# Patient Record
Sex: MALE | Race: WHITE | NOT HISPANIC OR LATINO
[De-identification: names, ages, dates, MRNs, and addresses within clinical notes are randomized per-mention and may not be internally consistent; named-entity substitution may affect disease eponyms.]

---

## 2017-01-12 ENCOUNTER — RX RENEWAL (OUTPATIENT)
Age: 72
End: 2017-01-12

## 2017-04-06 ENCOUNTER — APPOINTMENT (OUTPATIENT)
Dept: PULMONOLOGY | Facility: CLINIC | Age: 72
End: 2017-04-06

## 2017-04-06 VITALS
WEIGHT: 199 LBS | OXYGEN SATURATION: 94 % | BODY MASS INDEX: 36.4 KG/M2 | DIASTOLIC BLOOD PRESSURE: 88 MMHG | SYSTOLIC BLOOD PRESSURE: 125 MMHG | HEART RATE: 72 BPM

## 2017-07-17 ENCOUNTER — APPOINTMENT (OUTPATIENT)
Dept: PULMONOLOGY | Facility: CLINIC | Age: 72
End: 2017-07-17

## 2017-10-04 ENCOUNTER — APPOINTMENT (OUTPATIENT)
Dept: PULMONOLOGY | Facility: CLINIC | Age: 72
End: 2017-10-04

## 2017-12-22 ENCOUNTER — APPOINTMENT (OUTPATIENT)
Dept: PULMONOLOGY | Facility: CLINIC | Age: 72
End: 2017-12-22
Payer: MEDICARE

## 2017-12-22 VITALS
HEART RATE: 74 BPM | SYSTOLIC BLOOD PRESSURE: 130 MMHG | BODY MASS INDEX: 37.91 KG/M2 | HEIGHT: 62 IN | WEIGHT: 206 LBS | DIASTOLIC BLOOD PRESSURE: 80 MMHG | OXYGEN SATURATION: 93 %

## 2017-12-22 PROCEDURE — 99214 OFFICE O/P EST MOD 30 MIN: CPT

## 2017-12-22 RX ORDER — NAPROXEN 500 MG/1
500 TABLET ORAL
Qty: 14 | Refills: 0 | Status: ACTIVE | COMMUNITY
Start: 2017-10-24

## 2017-12-22 RX ORDER — GUAIFENESIN 600 MG/1
600 TABLET, EXTENDED RELEASE ORAL
Qty: 20 | Refills: 0 | Status: ACTIVE | COMMUNITY
Start: 2017-06-29

## 2017-12-22 RX ORDER — PREDNISONE 10 MG/1
10 TABLET ORAL
Qty: 6 | Refills: 0 | Status: DISCONTINUED | COMMUNITY
Start: 2017-07-11 | End: 2017-12-22

## 2018-04-26 ENCOUNTER — APPOINTMENT (OUTPATIENT)
Dept: PULMONOLOGY | Facility: CLINIC | Age: 73
End: 2018-04-26
Payer: MEDICARE

## 2018-04-26 VITALS — DIASTOLIC BLOOD PRESSURE: 70 MMHG | HEART RATE: 78 BPM | OXYGEN SATURATION: 96 % | SYSTOLIC BLOOD PRESSURE: 120 MMHG

## 2018-04-26 VITALS — WEIGHT: 208 LBS | BODY MASS INDEX: 38.04 KG/M2

## 2018-04-26 PROCEDURE — 85018 HEMOGLOBIN: CPT | Mod: QW

## 2018-04-26 PROCEDURE — 94060 EVALUATION OF WHEEZING: CPT

## 2018-04-26 PROCEDURE — 94727 GAS DIL/WSHOT DETER LNG VOL: CPT

## 2018-04-26 PROCEDURE — 94664 DEMO&/EVAL PT USE INHALER: CPT | Mod: 59

## 2018-04-26 PROCEDURE — 94729 DIFFUSING CAPACITY: CPT

## 2018-04-26 PROCEDURE — 99215 OFFICE O/P EST HI 40 MIN: CPT | Mod: 25

## 2018-10-25 ENCOUNTER — APPOINTMENT (OUTPATIENT)
Dept: PULMONOLOGY | Facility: CLINIC | Age: 73
End: 2018-10-25

## 2019-08-07 ENCOUNTER — FORM ENCOUNTER (OUTPATIENT)
Age: 74
End: 2019-08-07

## 2019-08-08 ENCOUNTER — OUTPATIENT (OUTPATIENT)
Dept: OUTPATIENT SERVICES | Facility: HOSPITAL | Age: 74
LOS: 1 days | End: 2019-08-08
Payer: MEDICARE

## 2019-08-08 ENCOUNTER — APPOINTMENT (OUTPATIENT)
Dept: RADIOLOGY | Facility: CLINIC | Age: 74
End: 2019-08-08
Payer: MEDICARE

## 2019-08-08 ENCOUNTER — APPOINTMENT (OUTPATIENT)
Dept: PULMONOLOGY | Facility: CLINIC | Age: 74
End: 2019-08-08
Payer: MEDICARE

## 2019-08-08 VITALS — HEIGHT: 63 IN | WEIGHT: 198 LBS | BODY MASS INDEX: 35.08 KG/M2

## 2019-08-08 VITALS — DIASTOLIC BLOOD PRESSURE: 70 MMHG | HEART RATE: 77 BPM | OXYGEN SATURATION: 94 % | SYSTOLIC BLOOD PRESSURE: 118 MMHG

## 2019-08-08 DIAGNOSIS — R50.9 FEVER, UNSPECIFIED: ICD-10-CM

## 2019-08-08 DIAGNOSIS — R91.8 OTHER NONSPECIFIC ABNORMAL FINDING OF LUNG FIELD: ICD-10-CM

## 2019-08-08 PROCEDURE — 94010 BREATHING CAPACITY TEST: CPT

## 2019-08-08 PROCEDURE — 71046 X-RAY EXAM CHEST 2 VIEWS: CPT | Mod: 26

## 2019-08-08 PROCEDURE — 71046 X-RAY EXAM CHEST 2 VIEWS: CPT

## 2019-08-08 PROCEDURE — 99205 OFFICE O/P NEW HI 60 MIN: CPT | Mod: 25

## 2019-08-08 RX ORDER — SULFAMETHOXAZOLE AND TRIMETHOPRIM 800; 160 MG/1; MG/1
800-160 TABLET ORAL
Qty: 14 | Refills: 0 | Status: DISCONTINUED | COMMUNITY
Start: 2017-09-28 | End: 2019-08-08

## 2019-08-08 NOTE — PHYSICAL EXAM
[Normal Appearance] : normal appearance [General Appearance - Well Developed] : well developed [Well Groomed] : well groomed [General Appearance - Well Nourished] : well nourished [General Appearance - In No Acute Distress] : no acute distress [No Deformities] : no deformities [Normal Conjunctiva] : the conjunctiva exhibited no abnormalities [Eyelids - No Xanthelasma] : the eyelids demonstrated no xanthelasmas [I] : I [Neck Circumference: ___] : neck circumference is [unfilled] [Heart Rate And Rhythm] : heart rate and rhythm were normal [Murmurs] : no murmurs present [Heart Sounds] : normal S1 and S2 [Respiration, Rhythm And Depth] : normal respiratory rhythm and effort [Auscultation Breath Sounds / Voice Sounds] : lungs were clear to auscultation bilaterally [Exaggerated Use Of Accessory Muscles For Inspiration] : no accessory muscle use [Abdomen Tenderness] : non-tender [Abdomen Soft] : soft [Abdomen Mass (___ Cm)] : no abdominal mass palpated [Abnormal Walk] : normal gait [Nail Clubbing] : no clubbing of the fingernails [Gait - Sufficient For Exercise Testing] : the gait was sufficient for exercise testing [Cyanosis, Localized] : no localized cyanosis [Petechial Hemorrhages (___cm)] : no petechial hemorrhages [] : no ischemic changes [Skin Color & Pigmentation] : normal skin color and pigmentation [Skin Lesions] : no skin lesions [No Venous Stasis] : no venous stasis [No Skin Ulcers] : no skin ulcer [No Xanthoma] : no  xanthoma was observed [Sensation] : the sensory exam was normal to light touch and pinprick [Deep Tendon Reflexes (DTR)] : deep tendon reflexes were 2+ and symmetric [No Focal Deficits] : no focal deficits

## 2019-08-08 NOTE — PROCEDURE
[Spirometry] : stable [FreeTextEntry1] : Result Date: 7/13/2019\par Facility: [facility] EXAM:  CT Chest Without Contrast EXAM DATE/TIME:  7/13/2019 8:23 PM CLINICAL HISTORY:  74 years old, male; Cough; Additional info: SOB TECHNIQUE:  Imaging protocol: Axial computed tomography images of the chest without intravenous contrast.  Lack of contrast decreases sensitivity Coronal and sagittal reformatted images were created and reviewed.  3D rendering: MIP reconstructed images were created and reviewed.  Radiation optimization: All CT scans at this facility use at least one of these dose optimization techniques: automated exposure control; mA and/or kV adjustment per patient size (includes targeted exams where dose is matched to clinical indication); or iterative reconstruction. COMPARISON:  DX CHEST PORT 7/13/2019 5:52 PM FINDINGS:  Lungs: Centrilobular emphysema bilaterally. Patchy consolidation is noted in the periphery of the right upper lobe laterally which probably represents pneumonia but given the presumed high risk for COPD followup is recommended to exclude an underlying mass. Pulmonary nodules are seen in both lungs. A 7 mm nodule image 47 in the left is noted. 8mm nodule right lower lobe image 43. Pleural-based density in the left anteriorly measuring 1.4 cm. These are indeterminate.  Pleural space: No pneumothorax  Heart: Vascular calcifications are including coronary calcifications.  Mediastinum: Small hiatal hernia.  Aorta: Unremarkable. No aortic aneurysm.  Lymph nodes: Borderline mediastinal nodes  Bones/joints: Degenerative change in the spine. L1 compression anteriorly wedged mildly probably chronic.  Soft tissues: Unremarkable.  Liver: Hepatic granuloma IMPRESSION: 1. Centrilobular emphysema bilaterally. 2. Patchy consolidation is noted in the periphery of the right upper lobe laterally which probably represents pneumonia but given the presumed high risk for COPD followup is recommended to exclude an underlying mass. 3. Pulmonary nodules are seen in both lungs. A 7 mm nodule image 47 in the left is noted. 8mm nodule right lower lobe image 43. Pleural-based density in the left anteriorly measuring 1.4 cm. These are indeterminate. Fleischner Society 2017 Guidelines High Risk Patients as defined in the 2017 Fleischner Society Guidelines: ?      History of heavy smoking ?    Exposure to asbestos, radium, or uranium ? Family history of lung cancer ?         Emphysema and pulmonary fibrosis (IPF in particular) ?      Older Age ?     Sex (females at greater risk than men) ? Race (Blacks and  at higher risk) ?   Marginal spiculation / suspicious morphology ?           Upper lobe location (also apex) ?       Multiple nodules (2-5 nodules highest risk) ? Exceptions, such as technically suboptimal scanning FLEISCHNER SOLID LESS THAN 6 SINGLE As per Fleischner Society 2017 guidelines for follow-up and management of pulmonary nodules: For patients at low risk (minimal or absent history of smoking and of other known risk factors), no routine follow-up. For patient at high risk (history of smoking or of other known risk factors), recommend optional CT at 12 months. FLEISCHNER SOLID LESS THAN 6 MULTIPLE As per Fleischner Society 2017 guidelines for follow-up and management of pulmonary nodules: For patients at low risk (minimal or absent history of smoking and of other known risk factors), no routine follow-up. For patient at high risk (history of smoking or of other known risk factors), recommend optional CT at 12 months. FLEISCHNER SOLID SIX TO EIGHT SINGLE As per Fleischner Society 2017 guidelines for follow-up and management of pulmonary nodules: For patients at low risk (minimal or absent history of smoking and of other known risk factors), recommend CT at 6-12 months, then consider CT at 18-24 months. For patient at high risk (history of smoking or of other known risk factors), recommend CT at 6-12 months, then CT at 18-24 months. FLEISCHNER SOLID SIX TO EIGHT MULTIPLE As per Fleischner Society 2017 guidelines for follow-up and management of pulmonary nodules: For patients at low risk (minimal or absent history of smoking and of other known risk factors), recommend CT at 3-6 months, then consider CT at 18-24 months. For patient at high risk (history of smoking or of other known risk factors), recommend CT at 3-6 months, then CT at 18-24 months. FLEISCHNER SOLID GREATER THAN EIGHT SINGLE As per Fleischner Society 2017 guidelines for follow-up and management of pulmonary nodules: For ALL patients, consider CT at 3 months, PET/CT or biopsy FLEISCHNER SOLID GREATER THAN EIGHT MULTIPLE As per Fleischner Society 2017 guidelines for follow-up and management of pulmonary nodules: For patients at low risk (minimal or absent history of smoking and of other known risk factors), recommend CT at 3-6 months, then consider CT at 18-24 months. For patient at high risk (history of smoking or of other known risk factors), recommend CT at 3-6 months, then CT at 18-24 months. FLEISCHNER GROUNDGLASS LESS THAN SIX As per Fleischner Society 2017 guidelines for follow-up and management of pulmonary nodules: no follow-up indicated FLEISCHNER GROUNDGLASS SIX OR GREATER As per Fleischner Society 2017 guidelines for follow-up and management of pulmonary nodules: CT at 6-12 months to confirm persistence, the CT at 3 and 5 years. FLEISCHNER PART-SOLID LESS THAN SIX As per Fleischner Society 2017 guidelines for follow-up and management of pulmonary nodules: no follow-up indicated FLEISCHNER PART-SOLID LESS SIX OR GREATER As per Fleischner Society 2017 guidelines for follow-up and management of pulmonary nodules: CT at 3-6 months to confirm persistence. If stable, then annual CT for 5 years. FLEISCHNER MULTIPLE SUBSOLID LESS THAN SIX As per Fleischner Society 2017 guidelines for follow-up and management of pulmonary nodules: CT at 3-6 months. If stable, CT at 2 and 4 years. FLEISCHNER MULTIPLE SUBSOLID SIX OR GREATER As per Fleischner Society 2017 guidelines for follow-up and management of pulmonary nodules: CT at 3-6 months. Subsequent management based on most suspicious nodule. THIS DOCUMENT HAS BEEN ELECTRONICALLY SIGNED BY DHAVAL CENTENO MD\par  \par X-ray Chest Portable\par  \par Result Date: 7/13/2019\par Facility: [facility] Portable chest on 7/13/2019 Clinical indication:Pain; Other: X; Additional info: Cough Comparison: None . Findings: There is no consolidation. Slightly prominent interstitial markings are suggested. There are no large pleural effusions. Impression: Slightly prominent interstitial markings are suggested. There are no large pleural effusions Follow up examination may be helpful for additional evaluation. THIS DOCUMENT HAS BEEN ELECTRONICALLY SIGNED BY MORGAN SHRESTHA MD\par  [___%] : current visit [unfilled]U%

## 2019-08-08 NOTE — DISCUSSION/SUMMARY
[FreeTextEntry1] : \par \par 75 yo seen today for the above. Patient with underlying COPD is currently in a mild to moderate category without active bronchospasm and will maintain the current drug regimen. His right upper lobe pneumonia clinically is responding although the patient did have a single episode of recurrent temperature. Etiology of the above is questionable and may be secondary to urinary tract infection rather than pneumonia. I am therefore sending the patient for a stat urine culture, as well as blood culture, and CBC. Repeat chest x-ray will be performed, with comparison against his prior chest x-ray and he will be called with the results. No further antibiotics are being initiated until the above is obtained. Patient has been instructed that if his symptoms worsen, he is to seek readmission to the hospital.

## 2019-08-08 NOTE — CONSULT LETTER
[Dear  ___] : Dear  [unfilled], [Consult Letter:] : I had the pleasure of evaluating your patient, [unfilled]. [Please see my note below.] : Please see my note below. [Sincerely,] : Sincerely, [DrElsi  ___] : Dr. JAMES [Maurice Rose MD] : Maurice Rose MD [FreeTextEntry3] : Maurice Rose MD FCCP\par Pulmonary/Critical Care/Sleep Medicine\par Department of Internal Medicine\par \par [facility] [employer]\par

## 2019-08-08 NOTE — REASON FOR VISIT
[Follow-Up] : a follow-up visit [Abnormal Chest X-Ray] : abnormal Chest X-Ray [COPD] : COPD [Shortness of Breath] : shortness of Breath

## 2019-08-08 NOTE — HISTORY OF PRESENT ILLNESS
[Doing Well] : doing well [None] : ~He/She~ has no comorbid illnesses [Difficulty Breathing During Exertion] : stable dyspnea on exertion [Coughing Up Sputum] : denies coughing up sputum [Feelings Of Weakness On Exertion] : stable exercise intolerance [Wheezing] : denies wheezing [Regional Soft Tissue Swelling Both Lower Extremities] : denies lower extremity edema [Adherent] : the patient is adherent with ~his/her~ medication regimen [FreeTextEntry9] : O2 dependent with exercise [de-identified] : bladder cancer and rul mass PET neg 2016 ,pulmonary nodules [de-identified] : O2 with sleep and exercise [FreeTextEntry1] : 77-year-old male with history of bladder cancer, stage II chronic obstructive lung disease, pulmonary nodules, Quintero's esophagus was admitted to [facility] on 7/13 and discharged on 7/16/19. Patient presented with increased shortness of breath, as well as low grade temperature and was found to have COPD exacerbation and a right upper lobe pneumonia. He was discharged on a drug regimen of prednisone taper as follows Cefpodoxime and azithromycin, which he completed. His symptoms abated except as green sputum within several days but last p.m. the patient noted a temperature of 101 with green sputum. He self treated with extra strength Tylenol with complete resolution of symptoms. Is here today for followup. Patient denies any chest pains, palpitations, lightheadedness, dizziness, wheezing. He is currently maintained on Advair and Incruze.

## 2019-08-09 LAB
BACTERIA UR CULT: NORMAL
BASOPHILS # BLD AUTO: 0.02 K/UL
BASOPHILS NFR BLD AUTO: 0.2 %
EOSINOPHIL # BLD AUTO: 0.21 K/UL
EOSINOPHIL NFR BLD AUTO: 2.1 %
HCT VFR BLD CALC: 41.7 %
HGB BLD-MCNC: 13.3 G/DL
IMM GRANULOCYTES NFR BLD AUTO: 0.6 %
LYMPHOCYTES # BLD AUTO: 1.61 K/UL
LYMPHOCYTES NFR BLD AUTO: 16.1 %
MAN DIFF?: NORMAL
MCHC RBC-ENTMCNC: 31.9 GM/DL
MCHC RBC-ENTMCNC: 31.9 PG
MCV RBC AUTO: 100 FL
MONOCYTES # BLD AUTO: 0.82 K/UL
MONOCYTES NFR BLD AUTO: 8.2 %
NEUTROPHILS # BLD AUTO: 7.3 K/UL
NEUTROPHILS NFR BLD AUTO: 72.8 %
PLATELET # BLD AUTO: 143 K/UL
PROCALCITONIN SERPL-MCNC: 0.08 NG/ML
RBC # BLD: 4.17 M/UL
RBC # FLD: 15.5 %
WBC # FLD AUTO: 10.02 K/UL

## 2019-08-14 LAB — BACTERIA BLD CULT: NORMAL

## 2019-09-20 ENCOUNTER — APPOINTMENT (OUTPATIENT)
Dept: PULMONOLOGY | Facility: CLINIC | Age: 74
End: 2019-09-20

## 2019-11-27 ENCOUNTER — APPOINTMENT (OUTPATIENT)
Dept: PULMONOLOGY | Facility: CLINIC | Age: 74
End: 2019-11-27
Payer: MEDICARE

## 2019-11-27 VITALS
SYSTOLIC BLOOD PRESSURE: 122 MMHG | WEIGHT: 193 LBS | DIASTOLIC BLOOD PRESSURE: 60 MMHG | BODY MASS INDEX: 34.63 KG/M2 | HEIGHT: 62.6 IN | HEART RATE: 64 BPM | OXYGEN SATURATION: 90 %

## 2019-11-27 PROCEDURE — 99215 OFFICE O/P EST HI 40 MIN: CPT | Mod: 25

## 2019-11-27 RX ORDER — NYSTATIN 100000 [USP'U]/G
100000 CREAM TOPICAL
Qty: 60 | Refills: 0 | Status: DISCONTINUED | COMMUNITY
Start: 2017-07-26 | End: 2019-11-27

## 2019-11-27 RX ORDER — NYSTATIN 100000 [USP'U]/G
100000 POWDER TOPICAL
Qty: 15 | Refills: 0 | Status: DISCONTINUED | COMMUNITY
Start: 2017-07-26 | End: 2019-11-27

## 2019-11-27 RX ORDER — ALBUTEROL SULFATE 2.5 MG/3ML
(2.5 MG/3ML) SOLUTION RESPIRATORY (INHALATION)
Refills: 5 | Status: ACTIVE | COMMUNITY
Start: 2018-04-26

## 2019-11-27 NOTE — DISCUSSION/SUMMARY
[COPD] : chronic obstructive pulmonary disease [Stable] : stable [Responding to Treatment] : responding to treatment [Stage II (Moderate)] : stage II (moderate) [None] : There are no changes in medication management [FreeTextEntry1] : Recent diagnosis of myasthenia, shows no significant respiratory compromise.\par \par Followup CAT scan was performed at a different radiologic site. Comparison of his original CAT scans with his most recent shows a significant improvement in the previously noted right upper lobe pneumonia. The current findings are most likely residual changes.

## 2019-11-27 NOTE — HISTORY OF PRESENT ILLNESS
[None] : None [Adherent] : the patient is adherent with ~his/her~ medication regimen [de-identified] : Bladder cancer, Quintero's esophagus, gastroesophageal reflux, prior pulmonary nodules, Myasthenia (recent Dx) [de-identified] : Status post community-acquired pneumonia, and last seen on 8/8/19

## 2019-11-27 NOTE — PROCEDURE
[FreeTextEntry1] : EXAM: XR CHEST PA LAT 2V \par \par \par PROCEDURE DATE: 2019 \par \par \par \par INTERPRETATION: EXAMINATION: XR CHEST PA AND LATERAL \par \par CLINICAL INDICATION: Fever \par \par TECHNIQUE: Frontal and lateral radiographs of the chest were obtained. \par \par COMPARISON: 19. \par \par FINDINGS: \par \par Cardiac silhouette normal in size. Patchy right upper lobe opacity. No \par pleural effusion or pneumothorax. \par \par IMPRESSION: \par Right upper lobe opacity appears increased from x-ray 2019 5:00 PM \par which may be technical as it appears improved compared to  radiograph \par from CT 2019. \par \par Follow-up to resolution recommended. \par \par \par LILLY ALVA M.D., ATTENDING RADIOLOGIST \par This document has been electronically signed. Aug 8 2019 3:39PM \par \par Page 1 of 2\par Office Location: [address] [place] [address]\par [facility]\par Office Phone: [phone]\par Office Fax: [phone]\par R. Phys. Name: Chapis Stephenson\par R. Phys. Address: [address] [place] [address], [facility] [address]\par R. Phys. Phone: [phone]\par PATIENT NAME: Hiren Hull\par PATIENT ID: [id number]\par : 1945\par DATE OF EXAM: 2019\par CT-CHEST PRE AND POST IV CONTRAST\par HISTORY:\par Myasthenia gravis, concern for thymoma.\par C67.0 Bladder cancer reported by the patient.\par Z87.891 Smoker prior reported by the patient.\par Per ACR guidelines and [facility] policy, a creatinine level test was performed prior\par to this exam. Results were as follows: Creatinine, 0.7 mg/dL.\par CT scan of the chest was performed with multislice axial images with reconstructions\par performed in axial, sagittal and coronal planes prior to and following IV administration\par of 60cc Omnipaque 350. This study was performed using automatic exposure control and an\par iterative reconstruction technique (radiation dose reduction software) to obtain a\par diagnostic image quality scan with patient dose as low as reasonably achievable. The mA\par and kV were adjusted according to patient size. The administered radiation dose was 6.5\par mSv.\par Priors: Prior chest CT was performed on 2018.\par THYROID: A 0.2 cm right thyroid calcification is reidentified.\par No anterior mediastinal mass is identified.\par LYMPH NODES: No axillary lymphadenopathy is identified. A 1.0 x 0.8 cm right lower\par paratracheal lymph node was previously 1.6 x 0.8 cm remeasuring in similar fashion and a\par 1.8 x 1.2 cm subcarinal lymph node was previously 2.2 x 1.4 cm remeasuring in similar\par fashion. There is a 2.6 x 0.8 cm right hilar lymph node (image 42 series 5) and a 2.0 x\par 0.6 cm left hilar lymph node (image 50 series 5).\par HEART / VASCULAR STRUCTURES: The heart is not enlarged. There is calcified coronary\par atherosclerotic disease. There is aortic valvular calcification. At the level of the main\par pulmonary artery the ascending aorta is 3.6 cm diameter and the descending aorta is 3.4 cm\par diameter. At the proximal aspect of the descending aorta (distal arch) the aorta is 4.4 cm\par diameter. A degree of aortic tortuosity is compatible with hypertension.\par LUNGS AND PLEURA: Moderate emphysema is again appreciated. There is mild peribronchial\par wall thickening. A 0.9 x 0.6 cm nodule in the right lower lobe (image 64 series 7), 1.2 x\par 0.8 nodular thickening along the anterior minor fissure (image 54), 0.4 cm nodular\par thickening along the minor fissure (image 48) 0.6 cm (lateral) and 0.4 cm (posterior) left\par lower lobe nodules (image 75), a juxtapleural 0.5 cm nodule in the lateral left lower lobe\par (image 78), 0.5 cm lingular nodule (image 57), 0.3 cm lingular nodule (image 59), 0.4 cm\par Page 2 of 2\par right middle lobe nodule (image 64), and 0.4 cm right middle lobe (lateral) nodule (image\par 57) are stable. A 0.4 cm nodule posterolaterally in the right upper lobe on image 32 and a\par 0.3 cm nodule anteriorly in the right upper lobe on image 48 are stable. However there has\par been interval development of a 1.6 x 0.7 x 1.0 cm nodular opacity posteriorly in the right\par upper lobe on axial image 24 series 7 corresponding with sagittal image 38 series 11 and\par coronal images 51-56 series 12.\par UPPER ABDOMEN: Limited evaluation of the upper abdomen shows no acute abnormality. A\par right renal anterior interpolar 1.6 cm cyst is partially included on the inferiormost\par image.\par BONES: No thoracic vertebral compression deformity or aggressive osseous lesion is\par identified. There are multilevel degenerative changes involving the spine.\par IMPRESSION:\par No anterior mediastinal mass is identified. Mildly prominent mediastinal lymph nodes have\par decreased since 2018. Hilar lymph nodes are subcentimeter in short axis.\par Emphysema and chronic bronchial inflammatory process. J43.8, J42\par Scattered pulmonary nodules measuring up to 0.9 cm and nodular thickening along the right\par minor fissure measuring up to 1.2 x 0.8 cm are stable. However, there has been interval\par development of a 1.6 x 0.7 x 1.0 cm nodular opacity posteriorly in the right upper lobe.\par Short-term chest CT follow-up in 3 months is recommended per the 2017 revised Fleischner\par Society guidelines. R91.8\par Thoracic aortic ectasia is again appreciated. Calcified coronary atherosclerotic disease\par is again noted.\par Signed by: Juan Carlos Seals MD\par Signed Date: 2019 12:45 PM EST\par SIGNED BY: Juan Carlos Seals M.D., Ext. [phone] 12:45 PM\par Chest CAT scan reviewed on PACS with the patient.\par

## 2019-11-27 NOTE — PHYSICAL EXAM
[General Appearance - Well Developed] : well developed [Normal Appearance] : normal appearance [Well Groomed] : well groomed [General Appearance - Well Nourished] : well nourished [No Deformities] : no deformities [General Appearance - In No Acute Distress] : no acute distress [Normal Conjunctiva] : the conjunctiva exhibited no abnormalities [Eyelids - No Xanthelasma] : the eyelids demonstrated no xanthelasmas [I] : I [Neck Circumference: ___] : neck circumference is [unfilled] [Heart Rate And Rhythm] : heart rate and rhythm were normal [Heart Sounds] : normal S1 and S2 [Murmurs] : no murmurs present [Respiration, Rhythm And Depth] : normal respiratory rhythm and effort [Exaggerated Use Of Accessory Muscles For Inspiration] : no accessory muscle use [Auscultation Breath Sounds / Voice Sounds] : lungs were clear to auscultation bilaterally [Abdomen Soft] : soft [Abdomen Tenderness] : non-tender [Abdomen Mass (___ Cm)] : no abdominal mass palpated [Abnormal Walk] : normal gait [Gait - Sufficient For Exercise Testing] : the gait was sufficient for exercise testing [Nail Clubbing] : no clubbing of the fingernails [Cyanosis, Localized] : no localized cyanosis [Petechial Hemorrhages (___cm)] : no petechial hemorrhages [Skin Color & Pigmentation] : normal skin color and pigmentation [] : no rash [No Venous Stasis] : no venous stasis [Skin Lesions] : no skin lesions [No Skin Ulcers] : no skin ulcer [No Xanthoma] : no  xanthoma was observed [Deep Tendon Reflexes (DTR)] : deep tendon reflexes were 2+ and symmetric [Sensation] : the sensory exam was normal to light touch and pinprick [No Focal Deficits] : no focal deficits

## 2019-11-27 NOTE — CONSULT LETTER
[Consult Letter:] : I had the pleasure of evaluating your patient, [unfilled]. [Please see my note below.] : Please see my note below. [Sincerely,] : Sincerely, [Maurice Rose MD] : Maurice Rose MD [Dear  ___] : Dear  [unfilled], [DrElsi  ___] : Dr. JAMES [FreeTextEntry3] : Maurice Rose MD FCCP\par Pulmonary/Critical Care/Sleep Medicine\par Department of Internal Medicine\par \par [facility] [employer]\par

## 2020-05-27 ENCOUNTER — APPOINTMENT (OUTPATIENT)
Dept: PULMONOLOGY | Facility: CLINIC | Age: 75
End: 2020-05-27

## 2021-07-21 ENCOUNTER — APPOINTMENT (OUTPATIENT)
Dept: PULMONOLOGY | Facility: CLINIC | Age: 76
End: 2021-07-21
Payer: MEDICARE

## 2021-07-21 ENCOUNTER — NON-APPOINTMENT (OUTPATIENT)
Age: 76
End: 2021-07-21

## 2021-07-21 VITALS
DIASTOLIC BLOOD PRESSURE: 74 MMHG | SYSTOLIC BLOOD PRESSURE: 134 MMHG | RESPIRATION RATE: 16 BRPM | HEART RATE: 77 BPM | OXYGEN SATURATION: 91 %

## 2021-07-21 DIAGNOSIS — Z86.69 PERSONAL HISTORY OF OTHER DISEASES OF THE NERVOUS SYSTEM AND SENSE ORGANS: ICD-10-CM

## 2021-07-21 DIAGNOSIS — K22.70 BARRETT'S ESOPHAGUS W/OUT DYSPLASIA: ICD-10-CM

## 2021-07-21 DIAGNOSIS — I25.10 ATHEROSCLEROTIC HEART DISEASE OF NATIVE CORONARY ARTERY W/OUT ANGINA PECTORIS: ICD-10-CM

## 2021-07-21 PROCEDURE — 99215 OFFICE O/P EST HI 40 MIN: CPT

## 2021-07-21 RX ORDER — PYRIDOSTIGMINE BROMIDE 180 MG/1
180 TABLET ORAL DAILY
Qty: 30 | Refills: 0 | Status: ACTIVE | COMMUNITY
Start: 2019-11-27

## 2021-07-21 RX ORDER — PREDNISONE 10 MG/1
10 TABLET ORAL DAILY
Qty: 120 | Refills: 0 | Status: DISCONTINUED | COMMUNITY
Start: 2019-11-27 | End: 2021-07-21

## 2021-07-21 NOTE — CONSULT LETTER
[Dear  ___] : Dear  [unfilled], [Consult Letter:] : I had the pleasure of evaluating your patient, [unfilled]. [Please see my note below.] : Please see my note below. [Consult Closing:] : Thank you very much for allowing me to participate in the care of this patient.  If you have any questions, please do not hesitate to contact me. [Sincerely,] : Sincerely, [FreeTextEntry3] : Maurice Rose MD FCCP\par Pulmonary/Critical Care/Sleep Medicine\par Department of Internal Medicine\par \par [facility] [employer] [facility]\par

## 2021-07-21 NOTE — PHYSICAL EXAM
[No Acute Distress] : no acute distress [Normal Oropharynx] : normal oropharynx [Normal Appearance] : normal appearance [No Neck Mass] : no neck mass [Normal Rate/Rhythm] : normal rate/rhythm [Normal S1, S2] : normal s1, s2 [No Murmurs] : no murmurs [No Abnormalities] : no abnormalities [Benign] : benign [Normal Gait] : normal gait [No Clubbing] : no clubbing [No Cyanosis] : no cyanosis [No Edema] : no edema [FROM] : FROM [Normal Color/ Pigmentation] : normal color/ pigmentation [No Focal Deficits] : no focal deficits [Oriented x3] : oriented x3 [Normal Affect] : normal affect [Wheeze] : wheeze

## 2021-07-21 NOTE — DISCUSSION/SUMMARY
[COPD] : chronic obstructive pulmonary disease [Stage II (Moderate)] : stage II (moderate) [Decompensated] : decompensated [PFTs] : pulmonary function tests [Chest CT] : chest CT [Medication Changes Per Orders] : Medication changes are as documented in orders [Supplemental Oxygen] : supplemental oxygen [FreeTextEntry1] : Pulmonary nodules most likely chronic. Will need f/u CCT [de-identified] : due to lack of meds and poor delivery from [facility] [de-identified] : Oxygen recerticifation

## 2021-07-21 NOTE — END OF VISIT
[FreeTextEntry3] : [facility] reviewe and records obtained [Time Spent: ___ minutes] : I have spent [unfilled] minutes of time on the encounter.

## 2021-07-21 NOTE — HISTORY OF PRESENT ILLNESS
[None] : None [Difficulty Breathing During Exertion] : stable dyspnea on exertion [Feelings Of Weakness On Exertion] : stable exercise intolerance [Coughing Up Sputum] : denies coughing up sputum [Wheezing] : denies wheezing [Regional Soft Tissue Swelling Both Lower Extremities] : denies lower extremity edema [Former] : former [>= 30 pack years] : >= 30 pack years [Intermittent] : Intermittent [NC] : Nasal Cannula [24 hrs] : 24 hours/day [FreeTextEntry1] : 3 [FreeTextEntry4] : [employer] [Fever] : no fever [Wt Gain ___ Lbs] : no recent weight gain [Adherent] : the patient is not adherent with ~his/her~ medication regimen [de-identified] : Bladder cancer, Quintero's esophagus, gastroesophageal reflux, prior pulmonary nodules, Myasthenia (recent Dx) [de-identified] : Pt last seen 11/27/19. Last hosp [facility] 12/14/19  now on O2 [FreeTextEntry3] : were not renewed over past year

## 2021-08-15 ENCOUNTER — APPOINTMENT (OUTPATIENT)
Dept: DISASTER EMERGENCY | Facility: CLINIC | Age: 76
End: 2021-08-15

## 2021-08-16 DIAGNOSIS — Z01.812 ENCOUNTER FOR PREPROCEDURAL LABORATORY EXAMINATION: ICD-10-CM

## 2021-08-16 LAB — SARS-COV-2 N GENE NPH QL NAA+PROBE: NOT DETECTED

## 2021-08-18 ENCOUNTER — APPOINTMENT (OUTPATIENT)
Dept: PULMONOLOGY | Facility: CLINIC | Age: 76
End: 2021-08-18
Payer: MEDICARE

## 2021-08-18 VITALS — RESPIRATION RATE: 16 BRPM | HEART RATE: 62 BPM | OXYGEN SATURATION: 96 %

## 2021-08-18 VITALS — DIASTOLIC BLOOD PRESSURE: 70 MMHG | SYSTOLIC BLOOD PRESSURE: 120 MMHG

## 2021-08-18 VITALS — WEIGHT: 191 LBS | HEIGHT: 62 IN | BODY MASS INDEX: 35.15 KG/M2

## 2021-08-18 PROCEDURE — 99214 OFFICE O/P EST MOD 30 MIN: CPT | Mod: 25

## 2021-08-18 PROCEDURE — 94010 BREATHING CAPACITY TEST: CPT

## 2021-08-18 NOTE — CONSULT LETTER
[Dear  ___] : Dear  [unfilled], [Consult Letter:] : I had the pleasure of evaluating your patient, [unfilled]. [Please see my note below.] : Please see my note below. [Consult Closing:] : Thank you very much for allowing me to participate in the care of this patient.  If you have any questions, please do not hesitate to contact me. [Sincerely,] : Sincerely, [FreeTextEntry3] : Maurice Rose MD FCCP\par Pulmonary/Critical Care/Sleep Medicine\par Department of Internal Medicine\par \par [facility] [employer]\par

## 2021-08-18 NOTE — END OF VISIT
[Time Spent: ___ minutes] : I have spent [unfilled] minutes of time on the encounter. [FreeTextEntry3] : [facility] reviewe and records obtained

## 2021-08-18 NOTE — HISTORY OF PRESENT ILLNESS
[Former] : former [>= 30 pack years] : >= 30 pack years [Intermittent] : Intermittent [NC] : Nasal Cannula [24 hrs] : 24 hours/day [Difficulty Breathing During Exertion] : improved dyspnea on exertion [Feelings Of Weakness On Exertion] : improved exercise intolerance [Regional Soft Tissue Swelling Both Lower Extremities] : denies lower extremity edema [None] : None [Coughing Up Sputum] : worsened coughing up sputum [Wheezing] : worsened wheezing [TextBox_4] : Has not been compliant with Myasthwenic meds [FreeTextEntry1] : 3 [FreeTextEntry4] : [employer] [Fever] : no fever [Wt Gain ___ Lbs] : no recent weight gain [Adherent] : the patient is not adherent with ~his/her~ medication regimen [de-identified] : Bladder cancer, Quintero's esophagus, gastroesophageal reflux, prior pulmonary nodules, Myasthenia (recent Dx) [FreeTextEntry3] : forgets to take

## 2021-08-18 NOTE — DISCUSSION/SUMMARY
[COPD] : chronic obstructive pulmonary disease [Decompensated] : decompensated [Stage II (Moderate)] : stage II (moderate) [PFTs] : pulmonary function tests [Chest CT] : chest CT [Medication Changes Per Orders] : Medication changes are as documented in orders [Supplemental Oxygen] : supplemental oxygen [FreeTextEntry1] : Pulmonary nodules benign\par \par MG may be active. Instructed to contact neurologist [de-identified] : due to lack of meds and poor delivery from [facility] and compliacted by MG [de-identified] : Oxygen recerticifation

## 2021-09-10 ENCOUNTER — RX RENEWAL (OUTPATIENT)
Age: 76
End: 2021-09-10

## 2021-09-26 ENCOUNTER — APPOINTMENT (OUTPATIENT)
Dept: DISASTER EMERGENCY | Facility: CLINIC | Age: 76
End: 2021-09-26

## 2021-09-26 ENCOUNTER — LABORATORY RESULT (OUTPATIENT)
Age: 76
End: 2021-09-26

## 2021-09-30 ENCOUNTER — APPOINTMENT (OUTPATIENT)
Dept: PULMONOLOGY | Facility: CLINIC | Age: 76
End: 2021-09-30
Payer: MEDICARE

## 2021-09-30 VITALS — OXYGEN SATURATION: 95 % | DIASTOLIC BLOOD PRESSURE: 68 MMHG | HEART RATE: 72 BPM | SYSTOLIC BLOOD PRESSURE: 120 MMHG

## 2021-09-30 VITALS — WEIGHT: 195 LBS | HEIGHT: 63 IN | BODY MASS INDEX: 34.55 KG/M2

## 2021-09-30 DIAGNOSIS — G70.00 MYASTHENIA GRAVIS W/OUT (ACUTE) EXACERBATION: ICD-10-CM

## 2021-09-30 PROCEDURE — 94010 BREATHING CAPACITY TEST: CPT

## 2021-09-30 PROCEDURE — 99214 OFFICE O/P EST MOD 30 MIN: CPT | Mod: 25

## 2021-09-30 NOTE — DISCUSSION/SUMMARY
[COPD] : chronic obstructive pulmonary disease [Stable] : stable [Stage II (Moderate)] : stage II (moderate) [PFTs] : pulmonary function tests [Chest CT] : chest CT [Medication Changes Per Orders] : Medication changes are as documented in orders [Supplemental Oxygen] : supplemental oxygen [FreeTextEntry1] : Pulmonary nodules benign\par \par  [de-identified] : due to lack of meds and poor delivery from [facility] and compliacted by MG [de-identified] : Oxygen recerticifation

## 2021-09-30 NOTE — HISTORY OF PRESENT ILLNESS
[Former] : former [>= 30 pack years] : >= 30 pack years [Intermittent] : Intermittent [NC] : Nasal Cannula [24 hrs] : 24 hours/day [Difficulty Breathing During Exertion] : improved dyspnea on exertion [Feelings Of Weakness On Exertion] : improved exercise intolerance [Coughing Up Sputum] : resolved coughing up sputum [Wheezing] : resolved wheezing [Regional Soft Tissue Swelling Both Lower Extremities] : denies lower extremity edema [___ Times a Day] : of [unfilled] time(s) a day [None] : None [TextBox_4] : Has not been compliant with Myasthwenic meds [FreeTextEntry1] : 3 [FreeTextEntry4] : [employer] [Fever] : no fever [Wt Gain ___ Lbs] : no recent weight gain [Adherent] : the patient is not adherent with ~his/her~ medication regimen [de-identified] : Bladder cancer, Quintero's esophagus, gastroesophageal reflux, prior pulmonary nodules, Myasthenia (recent Dx) [FreeTextEntry3] : forgets to take

## 2021-10-18 ENCOUNTER — INPATIENT (INPATIENT)
Facility: HOSPITAL | Age: 76
LOS: 2 days | Discharge: HOME | DRG: 57 | End: 2021-10-21
Attending: INTERNAL MEDICINE | Admitting: INTERNAL MEDICINE
Payer: COMMERCIAL

## 2021-10-18 VITALS
TEMPERATURE: 99 F | OXYGEN SATURATION: 95 % | SYSTOLIC BLOOD PRESSURE: 152 MMHG | HEART RATE: 71 BPM | HEIGHT: 64 IN | DIASTOLIC BLOOD PRESSURE: 84 MMHG | WEIGHT: 194.01 LBS | RESPIRATION RATE: 18 BRPM

## 2021-10-18 DIAGNOSIS — K21.9 GASTRO-ESOPHAGEAL REFLUX DISEASE WITHOUT ESOPHAGITIS: ICD-10-CM

## 2021-10-18 DIAGNOSIS — E78.5 HYPERLIPIDEMIA, UNSPECIFIED: ICD-10-CM

## 2021-10-18 DIAGNOSIS — E87.1 HYPO-OSMOLALITY AND HYPONATREMIA: ICD-10-CM

## 2021-10-18 DIAGNOSIS — D69.6 THROMBOCYTOPENIA, UNSPECIFIED: ICD-10-CM

## 2021-10-18 DIAGNOSIS — E11.9 TYPE 2 DIABETES MELLITUS WITHOUT COMPLICATIONS: ICD-10-CM

## 2021-10-18 DIAGNOSIS — G70.00 MYASTHENIA GRAVIS WITHOUT (ACUTE) EXACERBATION: ICD-10-CM

## 2021-10-18 DIAGNOSIS — N40.0 BENIGN PROSTATIC HYPERPLASIA WITHOUT LOWER URINARY TRACT SYMPTOMS: ICD-10-CM

## 2021-10-18 DIAGNOSIS — Z29.9 ENCOUNTER FOR PROPHYLACTIC MEASURES, UNSPECIFIED: ICD-10-CM

## 2021-10-18 DIAGNOSIS — R56.9 UNSPECIFIED CONVULSIONS: ICD-10-CM

## 2021-10-18 DIAGNOSIS — I25.10 ATHEROSCLEROTIC HEART DISEASE OF NATIVE CORONARY ARTERY WITHOUT ANGINA PECTORIS: ICD-10-CM

## 2021-10-18 DIAGNOSIS — J44.9 CHRONIC OBSTRUCTIVE PULMONARY DISEASE, UNSPECIFIED: ICD-10-CM

## 2021-10-18 DIAGNOSIS — I10 ESSENTIAL (PRIMARY) HYPERTENSION: ICD-10-CM

## 2021-10-18 LAB
ALBUMIN SERPL ELPH-MCNC: 3.4 G/DL — SIGNIFICANT CHANGE UP (ref 3.3–5)
ALP SERPL-CCNC: 80 U/L — SIGNIFICANT CHANGE UP (ref 40–120)
ALT FLD-CCNC: 27 U/L — SIGNIFICANT CHANGE UP (ref 12–78)
ANION GAP SERPL CALC-SCNC: 5 MMOL/L — SIGNIFICANT CHANGE UP (ref 5–17)
AST SERPL-CCNC: 19 U/L — SIGNIFICANT CHANGE UP (ref 15–37)
BASOPHILS # BLD AUTO: 0.02 K/UL — SIGNIFICANT CHANGE UP (ref 0–0.2)
BASOPHILS NFR BLD AUTO: 0.3 % — SIGNIFICANT CHANGE UP (ref 0–2)
BILIRUB SERPL-MCNC: 0.7 MG/DL — SIGNIFICANT CHANGE UP (ref 0.2–1.2)
BUN SERPL-MCNC: 11 MG/DL — SIGNIFICANT CHANGE UP (ref 7–23)
CALCIUM SERPL-MCNC: 8.6 MG/DL — SIGNIFICANT CHANGE UP (ref 8.5–10.1)
CHLORIDE SERPL-SCNC: 102 MMOL/L — SIGNIFICANT CHANGE UP (ref 96–108)
CO2 SERPL-SCNC: 26 MMOL/L — SIGNIFICANT CHANGE UP (ref 22–31)
CREAT SERPL-MCNC: 0.7 MG/DL — SIGNIFICANT CHANGE UP (ref 0.5–1.3)
EOSINOPHIL # BLD AUTO: 0.07 K/UL — SIGNIFICANT CHANGE UP (ref 0–0.5)
EOSINOPHIL NFR BLD AUTO: 0.9 % — SIGNIFICANT CHANGE UP (ref 0–6)
GLUCOSE SERPL-MCNC: 118 MG/DL — HIGH (ref 70–99)
HCT VFR BLD CALC: 40.1 % — SIGNIFICANT CHANGE UP (ref 39–50)
HGB BLD-MCNC: 13.6 G/DL — SIGNIFICANT CHANGE UP (ref 13–17)
IMM GRANULOCYTES NFR BLD AUTO: 0.4 % — SIGNIFICANT CHANGE UP (ref 0–1.5)
LYMPHOCYTES # BLD AUTO: 1.3 K/UL — SIGNIFICANT CHANGE UP (ref 1–3.3)
LYMPHOCYTES # BLD AUTO: 17.6 % — SIGNIFICANT CHANGE UP (ref 13–44)
MCHC RBC-ENTMCNC: 32.5 PG — SIGNIFICANT CHANGE UP (ref 27–34)
MCHC RBC-ENTMCNC: 33.9 GM/DL — SIGNIFICANT CHANGE UP (ref 32–36)
MCV RBC AUTO: 95.7 FL — SIGNIFICANT CHANGE UP (ref 80–100)
MONOCYTES # BLD AUTO: 0.58 K/UL — SIGNIFICANT CHANGE UP (ref 0–0.9)
MONOCYTES NFR BLD AUTO: 7.9 % — SIGNIFICANT CHANGE UP (ref 2–14)
NEUTROPHILS # BLD AUTO: 5.37 K/UL — SIGNIFICANT CHANGE UP (ref 1.8–7.4)
NEUTROPHILS NFR BLD AUTO: 72.9 % — SIGNIFICANT CHANGE UP (ref 43–77)
NRBC # BLD: 0 /100 WBCS — SIGNIFICANT CHANGE UP (ref 0–0)
PLATELET # BLD AUTO: 143 K/UL — LOW (ref 150–400)
POTASSIUM SERPL-MCNC: 4.7 MMOL/L — SIGNIFICANT CHANGE UP (ref 3.5–5.3)
POTASSIUM SERPL-SCNC: 4.7 MMOL/L — SIGNIFICANT CHANGE UP (ref 3.5–5.3)
PROT SERPL-MCNC: 7.1 G/DL — SIGNIFICANT CHANGE UP (ref 6–8.3)
RBC # BLD: 4.19 M/UL — LOW (ref 4.2–5.8)
RBC # FLD: 15.2 % — HIGH (ref 10.3–14.5)
SARS-COV-2 RNA SPEC QL NAA+PROBE: SIGNIFICANT CHANGE UP
SODIUM SERPL-SCNC: 133 MMOL/L — LOW (ref 135–145)
WBC # BLD: 7.37 K/UL — SIGNIFICANT CHANGE UP (ref 3.8–10.5)
WBC # FLD AUTO: 7.37 K/UL — SIGNIFICANT CHANGE UP (ref 3.8–10.5)

## 2021-10-18 PROCEDURE — 71046 X-RAY EXAM CHEST 2 VIEWS: CPT | Mod: 26

## 2021-10-18 PROCEDURE — 99223 1ST HOSP IP/OBS HIGH 75: CPT | Mod: GC

## 2021-10-18 PROCEDURE — 93010 ELECTROCARDIOGRAM REPORT: CPT

## 2021-10-18 PROCEDURE — 99285 EMERGENCY DEPT VISIT HI MDM: CPT

## 2021-10-18 PROCEDURE — 71045 X-RAY EXAM CHEST 1 VIEW: CPT | Mod: 26,59

## 2021-10-18 RX ORDER — BUDESONIDE AND FORMOTEROL FUMARATE DIHYDRATE 160; 4.5 UG/1; UG/1
2 AEROSOL RESPIRATORY (INHALATION)
Refills: 0 | Status: DISCONTINUED | OUTPATIENT
Start: 2021-10-18 | End: 2021-10-21

## 2021-10-18 RX ORDER — FLUTICASONE PROPIONATE AND SALMETEROL 50; 250 UG/1; UG/1
1 POWDER ORAL; RESPIRATORY (INHALATION)
Qty: 0 | Refills: 0 | DISCHARGE

## 2021-10-18 RX ORDER — ENOXAPARIN SODIUM 100 MG/ML
40 INJECTION SUBCUTANEOUS DAILY
Refills: 0 | Status: DISCONTINUED | OUTPATIENT
Start: 2021-10-18 | End: 2021-10-21

## 2021-10-18 RX ORDER — DEXTROSE 50 % IN WATER 50 %
25 SYRINGE (ML) INTRAVENOUS ONCE
Refills: 0 | Status: DISCONTINUED | OUTPATIENT
Start: 2021-10-18 | End: 2021-10-21

## 2021-10-18 RX ORDER — PYRIDOSTIGMINE BROMIDE 60 MG/5ML
60 SOLUTION ORAL EVERY 6 HOURS
Refills: 0 | Status: DISCONTINUED | OUTPATIENT
Start: 2021-10-18 | End: 2021-10-21

## 2021-10-18 RX ORDER — ACETAMINOPHEN 500 MG
2 TABLET ORAL
Qty: 0 | Refills: 0 | DISCHARGE

## 2021-10-18 RX ORDER — OXCARBAZEPINE 300 MG/1
300 TABLET, FILM COATED ORAL
Refills: 0 | Status: DISCONTINUED | OUTPATIENT
Start: 2021-10-18 | End: 2021-10-21

## 2021-10-18 RX ORDER — ATORVASTATIN CALCIUM 80 MG/1
40 TABLET, FILM COATED ORAL AT BEDTIME
Refills: 0 | Status: DISCONTINUED | OUTPATIENT
Start: 2021-10-18 | End: 2021-10-21

## 2021-10-18 RX ORDER — IPRATROPIUM/ALBUTEROL SULFATE 18-103MCG
3 AEROSOL WITH ADAPTER (GRAM) INHALATION ONCE
Refills: 0 | Status: COMPLETED | OUTPATIENT
Start: 2021-10-18 | End: 2021-10-18

## 2021-10-18 RX ORDER — GLUCAGON INJECTION, SOLUTION 0.5 MG/.1ML
1 INJECTION, SOLUTION SUBCUTANEOUS ONCE
Refills: 0 | Status: DISCONTINUED | OUTPATIENT
Start: 2021-10-18 | End: 2021-10-21

## 2021-10-18 RX ORDER — DIPHENHYDRAMINE HCL 50 MG
25 CAPSULE ORAL ONCE
Refills: 0 | Status: COMPLETED | OUTPATIENT
Start: 2021-10-18 | End: 2021-10-18

## 2021-10-18 RX ORDER — INSULIN LISPRO 100/ML
VIAL (ML) SUBCUTANEOUS
Refills: 0 | Status: DISCONTINUED | OUTPATIENT
Start: 2021-10-18 | End: 2021-10-21

## 2021-10-18 RX ORDER — ALBUTEROL 90 UG/1
1 AEROSOL, METERED ORAL DAILY
Refills: 0 | Status: DISCONTINUED | OUTPATIENT
Start: 2021-10-18 | End: 2021-10-21

## 2021-10-18 RX ORDER — DEXTROSE 50 % IN WATER 50 %
12.5 SYRINGE (ML) INTRAVENOUS ONCE
Refills: 0 | Status: DISCONTINUED | OUTPATIENT
Start: 2021-10-18 | End: 2021-10-21

## 2021-10-18 RX ORDER — ALBUTEROL 90 UG/1
2 AEROSOL, METERED ORAL
Qty: 0 | Refills: 0 | DISCHARGE

## 2021-10-18 RX ORDER — LISINOPRIL 2.5 MG/1
2.5 TABLET ORAL DAILY
Refills: 0 | Status: DISCONTINUED | OUTPATIENT
Start: 2021-10-18 | End: 2021-10-21

## 2021-10-18 RX ORDER — SODIUM CHLORIDE 9 MG/ML
1000 INJECTION, SOLUTION INTRAVENOUS
Refills: 0 | Status: DISCONTINUED | OUTPATIENT
Start: 2021-10-18 | End: 2021-10-21

## 2021-10-18 RX ORDER — LISINOPRIL 2.5 MG/1
1 TABLET ORAL
Qty: 0 | Refills: 0 | DISCHARGE

## 2021-10-18 RX ORDER — DEXTROSE 50 % IN WATER 50 %
15 SYRINGE (ML) INTRAVENOUS ONCE
Refills: 0 | Status: DISCONTINUED | OUTPATIENT
Start: 2021-10-18 | End: 2021-10-21

## 2021-10-18 RX ORDER — ACETAMINOPHEN 500 MG
650 TABLET ORAL ONCE
Refills: 0 | Status: COMPLETED | OUTPATIENT
Start: 2021-10-18 | End: 2021-10-18

## 2021-10-18 RX ORDER — IMMUNE GLOBULIN (HUMAN) 10 G/100ML
30 INJECTION INTRAVENOUS; SUBCUTANEOUS DAILY
Refills: 0 | Status: COMPLETED | OUTPATIENT
Start: 2021-10-18 | End: 2021-10-20

## 2021-10-18 RX ORDER — UMECLIDINIUM 62.5 UG/1
1 AEROSOL, POWDER ORAL
Qty: 0 | Refills: 0 | DISCHARGE

## 2021-10-18 RX ORDER — INSULIN LISPRO 100/ML
VIAL (ML) SUBCUTANEOUS AT BEDTIME
Refills: 0 | Status: DISCONTINUED | OUTPATIENT
Start: 2021-10-18 | End: 2021-10-21

## 2021-10-18 RX ORDER — ALBUTEROL 90 UG/1
3 AEROSOL, METERED ORAL
Qty: 0 | Refills: 0 | DISCHARGE

## 2021-10-18 RX ORDER — PYRIDOSTIGMINE BROMIDE 60 MG/5ML
1 SOLUTION ORAL
Qty: 0 | Refills: 0 | DISCHARGE

## 2021-10-18 RX ADMIN — Medication 650 MILLIGRAM(S): at 21:13

## 2021-10-18 RX ADMIN — Medication 25 MILLIGRAM(S): at 21:13

## 2021-10-18 RX ADMIN — Medication 125 MILLIGRAM(S): at 23:30

## 2021-10-18 RX ADMIN — Medication 650 MILLIGRAM(S): at 22:13

## 2021-10-18 RX ADMIN — OXCARBAZEPINE 300 MILLIGRAM(S): 300 TABLET, FILM COATED ORAL at 21:12

## 2021-10-18 RX ADMIN — IMMUNE GLOBULIN (HUMAN) 50 GRAM(S): 10 INJECTION INTRAVENOUS; SUBCUTANEOUS at 21:41

## 2021-10-18 RX ADMIN — ATORVASTATIN CALCIUM 40 MILLIGRAM(S): 80 TABLET, FILM COATED ORAL at 21:13

## 2021-10-18 RX ADMIN — Medication 3 MILLILITER(S): at 23:43

## 2021-10-18 NOTE — ED PROVIDER NOTE - ATTENDING CONTRIBUTION TO CARE
75yo M ho DM, and asthma presnts from neurologist for ?admission. pt unsure why he is here, states he sees a neurologist every 3 months but does not know why, for last 3 weeks has been having left eye lid drooping and weakness, no vision changes, no diff breathing no other weakness, had labwork and MRI done and went to neuro today adnw as sent in   labs notable for abnormal acetylcholinesterase panel, will chacho sesay possibly myasthenia gravis? 75yo M ho DM, and asthma presnts from neurologist for ?admission. pt unsure why he is here, states he sees a neurologist every 3 months but does not know why, for last 3 weeks has been having left eye lid drooping and weakness, with double vision, no diff breathing no other weakness, had labwork and MRI done and went to neuro today adnw as sent in   labs notable for abnormal acetylcholinesterase panel, will chacho sesay possibly myasthenia gravis?

## 2021-10-18 NOTE — ED ADULT NURSE NOTE - OBJECTIVE STATEMENT
Received the patient in the Er. patient is alert and oriented. Skin warm and dry. C/O left eye unable to open and close for 3 weeks. patient is send by PMD to see the neurologist.

## 2021-10-18 NOTE — H&P ADULT - NSHPREVIEWOFSYSTEMS_GEN_ALL_CORE
REVIEW OF SYSTEMS:  Constitutional: denies fever or chills  HEENT: endorses L eye weakness, blurry vision, drooping. denies headache, dizziness, or lightheadedness  Respiratory: denies SOB or cough   Cardiovascular: denies CP or palpitations  Gastrointestinal: denies nausea, vomiting, diarrhea, abdominal pain, melena or hematochezia  Genitourinary: denies hematuria, dysuria, frequency, urgency  Musculoskeletal: denies muscle aches, joint swelling, or muscle weakness

## 2021-10-18 NOTE — H&P ADULT - PROBLEM SELECTOR PLAN 1
- Ptosis and SOB, likely exacerbation of myasthenia gravis   - Neurology following, Dr Bonilla   - Continue IVIG 0.4 gm/KG/wt daily for 3-5 days  - Continue mestinon 60 mg qid - Ptosis and blurry vision likely exacerbation of myasthenia gravis   - Neurology following, Dr Bonilla   - Continue IVIG 0.4 gm/KG/wt daily for 3-5 days  - Continue Mestinon 60 mg qid - Ptosis and blurry vision likely exacerbation of myasthenia gravis   - Neurology following, Dr Bonilla   - Continue IVIG 0.4 gm/KG/wt daily for 3-5 days  - Continue Mestinon 60 mg qid  -check NIF and VC

## 2021-10-18 NOTE — H&P ADULT - HISTORY OF PRESENT ILLNESS
PCP: Dr. Pipo Snow  Neuro: Dr. Bonilla       75 yo M with PMH BPH, GERD. DM, COPD, CAD, seizures, and myasthenia gravis presents to the ED with left eye weakness, drooping and blurry vision. Pt had blood work and an MRI done. Pt  went to his neurologist Dr. Bonilla and advised to come to the ED for further evaluation.     Denies fever, chills, chest pain, palpitations, SOB, cough, abdominal pain, nausea, vomiting, diarrhea, constipation, urinary frequency, urgency, or dysuria, headaches, changes in vision, dizziness, numbness, tingling.  Denies recent travel, recent antibiotic use, or sick contacts.    ED Course:   Vitals: BP: 152/84, HR: 71, Temp: 98.8 F, RR: 16, SpO2: 95% on RA  Labs: plt: 143, Na: 13, gluc: 118,   CXR:  EKG:   Received in the ED    77 yo M with PMH BPH, GERD. DM, COPD, CAD, seizures, and myasthenia gravis presents to the ED with left eye weakness, drooping and blurry vision. Pt had blood work and an MRI done. Pt went to his neurologist Dr. Bonilla and advised to come to the ED for further evaluation.     Denies fever, chills, chest pain, palpitations, SOB, cough, abdominal pain, nausea, vomiting, diarrhea, constipation, urinary frequency, urgency, or dysuria, headaches dizziness, numbness, tingling.  Denies recent travel, recent antibiotic use, or sick contacts.    ED Course:   Vitals: BP: 152/84, HR: 71, Temp: 98.8 F, RR: 16, SpO2: 95% on RA  Labs: plt: 143, Na 133, gluc 118,   CXR results pending  EKG pending completion      77 yo M with PMH BPH, GERD. DM, COPD, CAD, seizures, and myasthenia gravis presents to the ED with left eye weakness, drooping and blurry vision that has been ongoing for weeks and has occurred in the past. The patient states he went to his neurologist Dr. Bonilla today and advised to come to the ED for further evaluation. The patient denies drainage or trauma from eyes. Denies headache, dizziness, numbness, tingling, chest pain, SOB, palpitations.      ED Course:   Vitals: /84, HR 71, Temp 98.8 F, RR 16, SpO2 95% on RA  Labs: plt: 143, Na 133, gluc 118  CXR results pending  EKG pending completion      77 yo M with PMH BPH, GERD. DM, COPD, CAD, seizures, and myasthenia gravis presents to the ED with left eye weakness, drooping and blurry vision that has been ongoing for weeks and has occurred in the past. The patient states he went to his neurologist Dr. Bonilla today and advised to come to the ED for further evaluation. The patient denies drainage or trauma to eyes. Denies headache, dizziness, numbness, tingling, chest pain, SOB, palpitations.      ED Course:   Vitals: /84, HR 71, Temp 98.8 F, RR 16, SpO2 95% on RA  Labs: plt: 143, Na 133, gluc 118  CXR results pending  EKG pending completion      77 yo M with PMH BPH, GERD. DM, COPD, CAD, seizures, and myasthenia gravis presents to the ED with left eye weakness, drooping and blurry vision that has been ongoing for weeks and has occurred in the past. The patient states he went to his neurologist Dr. Bonilla today and advised to come to the ED for further evaluation. The patient denies drainage or trauma to eyes. Denies headache, dizziness, numbness, tingling, chest pain, SOB, palpitations. He denies drooling. He is compliant with his home mestinon. He takes daily prednisone for COPD.      ED Course:   Vitals: /84, HR 71, Temp 98.8 F, RR 16, SpO2 95% on RA  Labs: plt: 143, Na 133, gluc 118  CXR no infiltrates visualized [official results pending]

## 2021-10-18 NOTE — ED PROVIDER NOTE - MUSCULOSKELETAL, MLM
Spine appears normal, range of motion is not limited, no muscle or joint tenderness Strong peripheral pulses

## 2021-10-18 NOTE — H&P ADULT - NSICDXPASTMEDICALHX_GEN_ALL_CORE_FT
PAST MEDICAL HISTORY:  CAD (coronary artery disease)     COPD without exacerbation     Diabetes mellitus      PAST MEDICAL HISTORY:  Benign prostatic hyperplasia     CAD (coronary artery disease)     COPD without exacerbation     Diabetes mellitus     GERD (gastroesophageal reflux disease)     Myasthenia gravis     Seizures

## 2021-10-18 NOTE — H&P ADULT - ASSESSMENT
75 yo M with PMH BPH, GERD. DM, COPD, CAD, seizures, and myasthenia gravis presents to the ED with left eye weakness, drooping and blurry vision, admitted for myasthenia gravis exacerbation.

## 2021-10-18 NOTE — ED PROVIDER NOTE - OBJECTIVE STATEMENT
Pt is a 77 yo male with pmhx of BPH GERD DM COPD CAD seizures myasthenia gravis here for left eye lid weakness drooping and blurry vision. Pt denies any other complaints no chest pain no sob. Pt had lab work and MRI done and went to neuro today Dr. Bonilla and advised to come to er for admission. labs notable for abnormal acetylcholinesterase panel.     pcp Pipo Snow

## 2021-10-18 NOTE — CHART NOTE - NSCHARTNOTEFT_GEN_A_CORE
RAPID RESPONSE     Patient seen and examined at bedside. RR called @ [23:22] for rigors. Patient is a  75 yo M admitted for Myasthenia gravis exacerbation receiving IVIG at a rate of 50 cc/hour. About 20 into starting the infusion, patient began to experience rigors. finger stick 107. Patient also with labored respirations. O2 95% on RA.     Vital Signs Last 24 Hrs  T(C): 36.9 (18 Oct 2021 22:15), Max: 37.1 (18 Oct 2021 12:28)  T(F): 98.4 (18 Oct 2021 22:15), Max: 98.8 (18 Oct 2021 12:28)  HR: 107 (18 Oct 2021 23:52) (60 - 109)  BP: 143/79 (18 Oct 2021 22:15) (116/66 - 152/84)  BP(mean): --  RR: 17 (18 Oct 2021 22:15) (16 - 18)  SpO2: 97% (18 Oct 2021 23:52) (95% - 98%)      PHYSICAL EXAM:  GENERAL: in moderate distress   HEAD:  Atraumatic   EYES: conjunctiva and sclera clear  ENT: Moist mucous membranes  NECK: Supple, No JVD  CHEST/LUNG: Clear to auscultation bilaterally; No rales, rhonchi, wheezing or rubs. Unlabored respirations  HEART: tachycardic; No murmurs, rubs, or gallops  NERVOUS SYSTEM:  Alert & Oriented X3. + rigors upper and lower extremities bilaterally  MSK: FROM x 4 extremities       LABS:                        13.6   7.37  )-----------( 143      ( 18 Oct 2021 14:17 )             40.1       10-18    133<L>  |  102  |  11  ----------------------------<  118<H>  4.7   |  26  |  0.70    Ca    8.6      18 Oct 2021 14:17    TPro  7.1  /  Alb  3.4  /  TBili  0.7  /  DBili  x   /  AST  19  /  ALT  27  /  AlkPhos  80  10-18      LIVER FUNCTIONS - ( 18 Oct 2021 14:17 )  Alb: 3.4 g/dL / Pro: 7.1 g/dL / ALK PHOS: 80 U/L / ALT: 27 U/L / AST: 19 U/L / GGT: x             CAPILLARY BLOOD GLUCOSE      POCT Blood Glucose.: 93 mg/dL (18 Oct 2021 23:26)  POCT Blood Glucose.: 107 mg/dL (18 Oct 2021 22:22)    I&O's Summary    IMAGING:  CXR: clear lungs     ASSESSMENT/ PLAN:  75 yo M with PMH BPH, GERD. DM, COPD, CAD, seizures, and myasthenia gravis presents to the ED with left eye weakness, drooping and blurry vision, admitted for myasthenia gravis exacerbation, now with rigors, reaction to IVIG.     Plan:   - given solumedrol 125 mg IV X 1   - duonebs treatment x 1  - added continuous pulse oximetry   - confirmed with pharmacy start at 24 cc/hour   - continue to monitor   - RN to call if any changes RAPID RESPONSE     Patient seen and examined at bedside. RR called @ [23:22] for rigors. Patient is a  77 yo M admitted for Myasthenia gravis exacerbation receiving IVIG at a rate of 50 cc/hour. About 20 into starting the infusion, patient began to experience rigors. finger stick 107. Patient also with labored respirations. O2 95% on RA.     Vital Signs Last 24 Hrs  T(C): 36.9 (18 Oct 2021 22:15), Max: 37.1 (18 Oct 2021 12:28)  T(F): 98.4 (18 Oct 2021 22:15), Max: 98.8 (18 Oct 2021 12:28)  HR: 107 (18 Oct 2021 23:52) (60 - 109)  BP: 143/79 (18 Oct 2021 22:15) (116/66 - 152/84)  BP(mean): --  RR: 17 (18 Oct 2021 22:15) (16 - 18)  SpO2: 97% (18 Oct 2021 23:52) (95% - 98%)      PHYSICAL EXAM:  GENERAL: in moderate distress   HEAD:  Atraumatic   EYES: conjunctiva and sclera clear  ENT: Moist mucous membranes  NECK: Supple, No JVD  CHEST/LUNG: Clear to auscultation bilaterally; No rales, rhonchi, wheezing or rubs. labored respirations  HEART: tachycardic; No murmurs, rubs, or gallops  NERVOUS SYSTEM:  Alert & Oriented X3. + rigors upper and lower extremities bilaterally  MSK: FROM x 4 extremities       LABS:                        13.6   7.37  )-----------( 143      ( 18 Oct 2021 14:17 )             40.1       10-18    133<L>  |  102  |  11  ----------------------------<  118<H>  4.7   |  26  |  0.70    Ca    8.6      18 Oct 2021 14:17    TPro  7.1  /  Alb  3.4  /  TBili  0.7  /  DBili  x   /  AST  19  /  ALT  27  /  AlkPhos  80  10-18      LIVER FUNCTIONS - ( 18 Oct 2021 14:17 )  Alb: 3.4 g/dL / Pro: 7.1 g/dL / ALK PHOS: 80 U/L / ALT: 27 U/L / AST: 19 U/L / GGT: x             CAPILLARY BLOOD GLUCOSE      POCT Blood Glucose.: 93 mg/dL (18 Oct 2021 23:26)  POCT Blood Glucose.: 107 mg/dL (18 Oct 2021 22:22)    I&O's Summary    IMAGING:  CXR: clear lungs     ASSESSMENT/ PLAN:  77 yo M with PMH BPH, GERD. DM, COPD, CAD, seizures, and myasthenia gravis presents to the ED with left eye weakness, drooping and blurry vision, admitted for myasthenia gravis exacerbation, now with rigors, reaction to IVIG.     Plan:   - given solumedrol 125 mg IV X 1   - duonebs treatment x 1  - added continuous pulse oximetry   - confirmed with pharmacy start at 24 cc/hour   - continue to monitor   - RN to call if any changes RAPID RESPONSE     Patient seen and examined at bedside. RR called @ [23:22] for rigors. Patient is a  75 yo M admitted for Myasthenia gravis exacerbation receiving IVIG at a rate of 50 cc/hour. About 20 into starting the infusion, patient began to experience rigors. finger stick 107. Patient also with labored respirations. O2 95% on RA.     VS: Temp 97.0; /110; ; RR 20; Spo2 93%; BS 93    Vital Signs Last 24 Hrs  T(C): 36.9 (18 Oct 2021 22:15), Max: 37.1 (18 Oct 2021 12:28)  T(F): 98.4 (18 Oct 2021 22:15), Max: 98.8 (18 Oct 2021 12:28)  HR: 107 (18 Oct 2021 23:52) (60 - 109)  BP: 143/79 (18 Oct 2021 22:15) (116/66 - 152/84)  BP(mean): --  RR: 17 (18 Oct 2021 22:15) (16 - 18)  SpO2: 97% (18 Oct 2021 23:52) (95% - 98%)      PHYSICAL EXAM:  GENERAL: in moderate distress   HEAD:  Atraumatic   EYES: conjunctiva and sclera clear  ENT: Moist mucous membranes  NECK: Supple, No JVD  CHEST/LUNG: Clear to auscultation bilaterally; No rales, rhonchi, wheezing or rubs. labored respirations  HEART: tachycardic; No murmurs, rubs, or gallops  NERVOUS SYSTEM:  Alert & Oriented X3. + rigors upper and lower extremities bilaterally  MSK: FROM x 4 extremities       LABS:                        13.6   7.37  )-----------( 143      ( 18 Oct 2021 14:17 )             40.1       10-18    133<L>  |  102  |  11  ----------------------------<  118<H>  4.7   |  26  |  0.70    Ca    8.6      18 Oct 2021 14:17    TPro  7.1  /  Alb  3.4  /  TBili  0.7  /  DBili  x   /  AST  19  /  ALT  27  /  AlkPhos  80  10-18      LIVER FUNCTIONS - ( 18 Oct 2021 14:17 )  Alb: 3.4 g/dL / Pro: 7.1 g/dL / ALK PHOS: 80 U/L / ALT: 27 U/L / AST: 19 U/L / GGT: x             CAPILLARY BLOOD GLUCOSE      POCT Blood Glucose.: 93 mg/dL (18 Oct 2021 23:26)  POCT Blood Glucose.: 107 mg/dL (18 Oct 2021 22:22)    I&O's Summary    IMAGING:  CXR: clear lungs     ASSESSMENT/ PLAN:  75 yo M with PMH BPH, GERD. DM, COPD, CAD, seizures, and myasthenia gravis presents to the ED with left eye weakness, drooping and blurry vision, admitted for myasthenia gravis exacerbation, now with rigors, reaction to IVIG.     Plan:   - benadryl 50mg x 1  - given solumedrol 125 mg IV X 1   - duonebs treatment x 1  - added continuous pulse oximetry   - confirmed with pharmacy start at 24 cc/hour   - continue to monitor   - RN to call if any changes RAPID RESPONSE     Patient seen and examined at bedside. RR called @ [23:22] for rigors. Patient is a  77 yo M admitted for Myasthenia gravis exacerbation receiving IVIG at a rate of 50 cc/hour. About 20 into starting the infusion, patient began to experience rigors. finger stick 107. Patient also with labored respirations. O2 95% on RA.     VS: Temp 97.0; /110; ; RR 20; Spo2 93%; BS 93    Vital Signs Last 24 Hrs  T(C): 36.9 (18 Oct 2021 22:15), Max: 37.1 (18 Oct 2021 12:28)  T(F): 98.4 (18 Oct 2021 22:15), Max: 98.8 (18 Oct 2021 12:28)  HR: 107 (18 Oct 2021 23:52) (60 - 109)  BP: 143/79 (18 Oct 2021 22:15) (116/66 - 152/84)  BP(mean): --  RR: 17 (18 Oct 2021 22:15) (16 - 18)  SpO2: 97% (18 Oct 2021 23:52) (95% - 98%)      PHYSICAL EXAM:  GENERAL: in moderate distress   HEAD:  Atraumatic   EYES: conjunctiva and sclera clear  ENT: Moist mucous membranes  NECK: Supple, No JVD  CHEST/LUNG: Clear to auscultation bilaterally; No rales, rhonchi, wheezing or rubs. labored respirations  HEART: tachycardic; No murmurs, rubs, or gallops  NERVOUS SYSTEM:  Alert & Oriented X3. + rigors upper and lower extremities bilaterally  MSK: FROM x 4 extremities       LABS:                        13.6   7.37  )-----------( 143      ( 18 Oct 2021 14:17 )             40.1       10-18    133<L>  |  102  |  11  ----------------------------<  118<H>  4.7   |  26  |  0.70    Ca    8.6      18 Oct 2021 14:17    TPro  7.1  /  Alb  3.4  /  TBili  0.7  /  DBili  x   /  AST  19  /  ALT  27  /  AlkPhos  80  10-18      LIVER FUNCTIONS - ( 18 Oct 2021 14:17 )  Alb: 3.4 g/dL / Pro: 7.1 g/dL / ALK PHOS: 80 U/L / ALT: 27 U/L / AST: 19 U/L / GGT: x             CAPILLARY BLOOD GLUCOSE      POCT Blood Glucose.: 93 mg/dL (18 Oct 2021 23:26)  POCT Blood Glucose.: 107 mg/dL (18 Oct 2021 22:22)    I&O's Summary    IMAGING:  CXR: clear lungs     ASSESSMENT/ PLAN:  77 yo M with PMH BPH, GERD. DM, COPD, CAD, seizures, and myasthenia gravis presents to the ED with left eye weakness, drooping and blurry vision, admitted for myasthenia gravis exacerbation, now with rigors, reaction to IVIG.     Plan:   - benadryl 50mg x 1  - given solumedrol 125 mg IV X 1   - duonebs treatment x 1  - added continuous pulse oximetry   - confirmed with pharmacy start at 24 cc/hour   - continue to monitor   - RN to call if any changes    ___________________________________________________________________________________________  _____________________________________________________________________________________________________________________    Pt seen and examined at bedside @1:30am for follow up of rapid response called @23:22 for rigors.    ROS:  CONSTITUTIONAL: No weakness, fevers or chills  EYES/ENT: No visual changes;  No vertigo or throat pain   NECK: No pain or stiffness  RESPIRATORY: No cough, wheezing, hemoptysis; No shortness of breath  CARDIOVASCULAR: No chest pain or palpitations  GASTROINTESTINAL: No abdominal or epigastric pain. No nausea, vomiting, or hematemesis; No diarrhea or constipation. No melena or hematochezia.  GENITOURINARY: No dysuria, frequency or hematuria  NEUROLOGICAL: No numbness or weakness  SKIN: No itching, burning, rashes, or lesions   All other review of systems is negative unless indicated above.    PHYSICAL EXAM:  General: Well developed, well nourished, NAD  HEENT: NCAT, PERRLA, EOMI B/L, moist mucous membranes   Neck: Supple, nontender, no mass  Neurology: A&Ox3, nonfocal, CN II-XII grossly intact, sensation intact, no gait abnormalities   Respiratory: CTA B/L, No wheezes, rales, rhonchi  CV: RRR, +S1/S2, no murmurs, rubs or gallops  Abdominal: Soft, NT, ND +BSx4  Extremities: No clubbing, cyanosis, edema; + peripheral pulses  MSK: Normal ROM, no joint erythema or warmth, no joint swelling   Skin: warm, dry, normal color, no rash or abnormal lesions      Vital Signs Last 24 Hrs  T(C): 37.4 (19 Oct 2021 01:41), Max: 37.4 (19 Oct 2021 01:41)  T(F): 99.3 (19 Oct 2021 01:41), Max: 99.3 (19 Oct 2021 01:41)  HR: 73 (19 Oct 2021 01:41) (60 - 109)  BP: 126/69 (19 Oct 2021 01:41) (116/66 - 152/84)  BP(mean): --  RR: 17 (19 Oct 2021 01:41) (16 - 18)  SpO2: 93% (19 Oct 2021 01:41) (93% - 98%)                            13.6   7.37  )-----------( 143      ( 18 Oct 2021 14:17 )             40.1     10-18    133<L>  |  102  |  11  ----------------------------<  118<H>  4.7   |  26  |  0.70    Ca    8.6      18 Oct 2021 14:17    TPro  7.1  /  Alb  3.4  /  TBili  0.7  /  DBili  x   /  AST  19  /  ALT  27  /  AlkPhos  80  10-18            A/P RAPID RESPONSE     Patient seen and examined at bedside. RR called @ [23:22] for rigors. Patient is a  75 yo M admitted for Myasthenia gravis exacerbation receiving IVIG at a rate of 50 cc/hour. About 20 into starting the infusion, patient began to experience rigors. finger stick 107. Patient also with labored respirations. O2 95% on RA.     VS: Temp 97.0; /110; ; RR 20; Spo2 93%; BS 93    Vital Signs Last 24 Hrs  T(C): 36.9 (18 Oct 2021 22:15), Max: 37.1 (18 Oct 2021 12:28)  T(F): 98.4 (18 Oct 2021 22:15), Max: 98.8 (18 Oct 2021 12:28)  HR: 107 (18 Oct 2021 23:52) (60 - 109)  BP: 143/79 (18 Oct 2021 22:15) (116/66 - 152/84)  BP(mean): --  RR: 17 (18 Oct 2021 22:15) (16 - 18)  SpO2: 97% (18 Oct 2021 23:52) (95% - 98%)      PHYSICAL EXAM:  GENERAL: in moderate distress   HEAD:  Atraumatic   EYES: conjunctiva and sclera clear  ENT: Moist mucous membranes  NECK: Supple, No JVD  CHEST/LUNG: Clear to auscultation bilaterally; No rales, rhonchi, wheezing or rubs. labored respirations  HEART: tachycardic; No murmurs, rubs, or gallops  NERVOUS SYSTEM:  Alert & Oriented X3. + rigors upper and lower extremities bilaterally  MSK: FROM x 4 extremities       LABS:                        13.6   7.37  )-----------( 143      ( 18 Oct 2021 14:17 )             40.1       10-18    133<L>  |  102  |  11  ----------------------------<  118<H>  4.7   |  26  |  0.70    Ca    8.6      18 Oct 2021 14:17    TPro  7.1  /  Alb  3.4  /  TBili  0.7  /  DBili  x   /  AST  19  /  ALT  27  /  AlkPhos  80  10-18      LIVER FUNCTIONS - ( 18 Oct 2021 14:17 )  Alb: 3.4 g/dL / Pro: 7.1 g/dL / ALK PHOS: 80 U/L / ALT: 27 U/L / AST: 19 U/L / GGT: x             CAPILLARY BLOOD GLUCOSE      POCT Blood Glucose.: 93 mg/dL (18 Oct 2021 23:26)  POCT Blood Glucose.: 107 mg/dL (18 Oct 2021 22:22)    I&O's Summary    IMAGING:  CXR: clear lungs     ASSESSMENT/ PLAN:  75 yo M with PMH BPH, GERD. DM, COPD, CAD, seizures, and myasthenia gravis presents to the ED with left eye weakness, drooping and blurry vision, admitted for myasthenia gravis exacerbation, now with rigors, reaction to IVIG.     Plan:   - benadryl 50mg x 1  - given solumedrol 125 mg IV X 1   - duonebs treatment x 1  - added continuous pulse oximetry   - confirmed with pharmacy start at 24 cc/hour   - continue to monitor   - RN to call if any changes    ___________________________________________________________________________________________  _____________________________________________________________________________________________________________________    Pt seen and examined at bedside @1:30am for follow up of rapid response called @23:22 for rigors following initiation of IVIG infusion. Patient reports feeling much improved. Denies chills, shortness of breath, chest pain, nausea, vomiting.     VS: Temp 99.3; /69; HR 73; RR 17; Spo2 93 on 2L NC    PHYSICAL EXAM:  General: Well developed, well nourished, NAD, resting comfortably in bed  HEENT: NCAT, PERRLA, left eyelid droop, moist mucous membranes   Neck: Supple, nontender, no mass  Neurology: A&Ox3  Respiratory: CTA B/L, No wheezes, rales, rhonchi  CV: RRR, +S1/S2, no murmurs, rubs or gallops  Abdominal: Soft, NT, ND +BSx4  Extremities: No clubbing, cyanosis, edema; + peripheral pulses  MSK: Normal ROM of all 4 extremities  Skin: warm, dry, normal color, no rash or abnormal lesions      Vital Signs Last 24 Hrs  T(C): 37.4 (19 Oct 2021 01:41), Max: 37.4 (19 Oct 2021 01:41)  T(F): 99.3 (19 Oct 2021 01:41), Max: 99.3 (19 Oct 2021 01:41)  HR: 73 (19 Oct 2021 01:41) (60 - 109)  BP: 126/69 (19 Oct 2021 01:41) (116/66 - 152/84)  BP(mean): --  RR: 17 (19 Oct 2021 01:41) (16 - 18)  SpO2: 93% (19 Oct 2021 01:41) (93% - 98%)                            13.6   7.37  )-----------( 143      ( 18 Oct 2021 14:17 )             40.1     10-18    133<L>  |  102  |  11  ----------------------------<  118<H>  4.7   |  26  |  0.70    Ca    8.6      18 Oct 2021 14:17    TPro  7.1  /  Alb  3.4  /  TBili  0.7  /  DBili  x   /  AST  19  /  ALT  27  /  AlkPhos  80  10-18            A/P  75 yo M with PMH BPH, GERD. DM, COPD, CAD, seizures, and myasthenia gravis presents to the ED with left eye weakness, drooping and blurry vision, admitted for myasthenia gravis exacerbation, now with rigors, reaction to IVIG.   - continuous pulse oximetry   - confirmed with pharmacy: will restart now at 24 cc/hour per protocol  - continue to monitor   - RN to call if any changes

## 2021-10-18 NOTE — H&P ADULT - NSHPSOCIALHISTORY_GEN_ALL_CORE
Lives:   ADLS:  Tobacco:  Alcohol: Lives: alone  ADLS: independently   Tobacco: denies  Alcohol: denies   Diet: regular w/o allergies or restrictions

## 2021-10-18 NOTE — H&P ADULT - PROBLEM SELECTOR PLAN 4
- chronic, stable  - continue prednisone 2.5mg qd, advair diskus 250-50, albuterol 90mcg, albuterol 2.5mg/3mL, elipta 62.5mcg/h

## 2021-10-18 NOTE — ED ADULT TRIAGE NOTE - BP NONINVASIVE DIASTOLIC (MM HG)
84 Ree: pt received from Dr. Gonzalez at s/o; pt with h/o endometriosis who p/w epig abd pain, n/v, seen in ed yesterday for same. + epig and lower abd tenderness on exam. Awaiting labs, pelvic us. Will continue to monitor. pt feeling much better, sono neg

## 2021-10-18 NOTE — ED PROVIDER NOTE - CLINICAL SUMMARY MEDICAL DECISION MAKING FREE TEXT BOX
Pt is a 77 yo male with left eye weakness spoke with Dr. Bonilla advised to admit for IVIG on resp complaints

## 2021-10-18 NOTE — H&P ADULT - ATTENDING COMMENTS
75 yo M with PMH BPH, GERD. DM, COPD, CAD, seizures, and myasthenia gravis presents to the ED with left eye weakness, drooping and blurry vision, admitted for myasthenia gravis exacerbation.    HPI as above.     T(C): 36.2 (10-18-21 @ 17:25), Max: 37.1 (10-18-21 @ 12:28)  HR: 68 (10-18-21 @ 17:25) (68 - 71)  BP: 130/60 (10-18-21 @ 17:25) (130/60 - 152/84)  RR: 16 (10-18-21 @ 17:25) (16 - 18)  SpO2: 98% (10-18-21 @ 17:25) (95% - 98%)  Wt(kg): --    Physical Exam:   GENERAL: well-groomed, well-developed, NAD  HEENT: head NC/AT; EOM intact, PERRLA, conjunctiva & sclera clear; hearing grossly intact, moist mucous membranes. Left eye ptosis  NECK: supple, no JVD  RESPIRATORY: CTA B/L, no wheezing, rales, rhonchi or rubs  CARDIOVASCULAR: S1&S2, RRR, no murmurs or gallops  ABDOMEN: soft, non-tender, non-distended, + Bowel sounds x4 quadrants, no guarding, rebound or rigidity  MUSCULOSKELETAL:  no clubbing, cyanosis or edema of all 4 extremities  LYMPH: no cervical lymphadenopathy  VASCULAR: Radial pulses 2+ bilaterally, no varicose veins   SKIN: warm and dry, color normal  NEUROLOGIC: AA&O X3, CN2-12 intact w/ no focal deficits aside from ptosis listed above, no sensory loss, motor Strength 5/5 in UE & LE B/L  Psych: Normal mood and affect, normal behavior    Plan:   Start IVIG, continue mestinon. no indication for IV steroids.   -check NIF and VC. Passed dysphagia screen.   -discussed with neurology.   -remainder as above,.

## 2021-10-18 NOTE — H&P ADULT - PROBLEM SELECTOR PLAN 2
- Lovenox 40 mg qd - Na 133 on admission  - no signs or sx of hyponatremia   - monitor with daily CMPs

## 2021-10-18 NOTE — H&P ADULT - NSHPPHYSICALEXAM_GEN_ALL_CORE
T(C): 37.1 (10-18-21 @ 12:28), Max: 37.1 (10-18-21 @ 12:28)  HR: 71 (10-18-21 @ 12:28) (71 - 71)  BP: 152/84 (10-18-21 @ 12:28) (152/84 - 152/84)  RR: 18 (10-18-21 @ 12:28) (18 - 18)  SpO2: 95% (10-18-21 @ 12:28) (95% - 95%)  Wt(kg): --    Physical Exam:   GENERAL: well-groomed, well-developed, NAD  HEENT: head NC/AT; EOM intact, PERRLA, conjunctiva & sclera clear; hearing grossly intact, moist mucous membranes  NECK: supple, no JVD  RESPIRATORY: CTA B/L, no wheezing, rales, rhonchi or rubs  CARDIOVASCULAR: S1&S2, RRR, no murmurs or gallops  ABDOMEN: soft, non-tender, non-distended, + Bowel sounds x4 quadrants, no guarding, rebound or rigidity  MUSCULOSKELETAL:  no clubbing, cyanosis or edema of all 4 extremities  LYMPH: no cervical lymphadenopathy  VASCULAR: Radial pulses 2+ bilaterally, no varicose veins   SKIN: warm and dry, color normal  NEUROLOGIC: AA&O X3, CN2-12 intact w/ no focal deficits, no sensory loss, motor Strength 5/5 in UE & LE B/L  Psych: Normal mood and affect, normal behavior T(C): 37.1 (10-18-21 @ 12:28), Max: 37.1 (10-18-21 @ 12:28)  HR: 71 (10-18-21 @ 12:28) (71 - 71)  BP: 152/84 (10-18-21 @ 12:28) (152/84 - 152/84)  RR: 18 (10-18-21 @ 12:28) (18 - 18)  SpO2: 95% (10-18-21 @ 12:28) (95% - 95%)  Wt(kg): --    Physical Exam:   GENERAL: well-appearing, comfortable, in NAD  HEENT: head NC/AT; L eyelid droop, conjunctiva & sclera clear; hearing grossly intact, moist mucous membranes  NECK: supple, no JVD  RESPIRATORY: CTA B/L, no wheezing, rales, rhonchi or rubs  CARDIOVASCULAR: S1&S2, RRR, no murmurs or gallops  ABDOMEN: soft, non-tender, non-distended, + Bowel sounds x4 quadrants, no guarding, rebound or rigidity  MUSCULOSKELETAL:  no clubbing, cyanosis or edema of all 4 extremities  LYMPH: no cervical lymphadenopathy  VASCULAR: Radial pulses 2+ bilaterally, no varicose veins   SKIN: warm and dry, color normal  NEUROLOGIC: AA&O X3, CN2-12 intact w/ no focal deficits, no sensory loss, motor Strength 5/5 in UE & LE B/L  Psych: Normal mood and affect, normal behavior T(C): 37.1 (10-18-21 @ 12:28), Max: 37.1 (10-18-21 @ 12:28)  HR: 71 (10-18-21 @ 12:28) (71 - 71)  BP: 152/84 (10-18-21 @ 12:28) (152/84 - 152/84)  RR: 18 (10-18-21 @ 12:28) (18 - 18)  SpO2: 95% (10-18-21 @ 12:28) (95% - 95%)  Wt(kg): --    Physical Exam:   GENERAL: well-appearing, comfortable, in NAD  HEENT: head NC/AT; L eyelid droop, conjunctiva & sclera clear; hearing grossly intact, moist mucous membranes  NECK: supple, no JVD  RESPIRATORY: CTA B/L, no wheezing, rales, rhonchi or rubs  CARDIOVASCULAR: S1&S2, RRR, no murmurs or gallops  ABDOMEN: soft, non-tender, non-distended, + Bowel sounds x4 quadrants, no guarding, rebound or rigidity  MUSCULOSKELETAL:  no clubbing, cyanosis or edema of all 4 extremities  LYMPH: no cervical lymphadenopathy  VASCULAR: Radial pulses 2+ bilaterally, no varicose veins   SKIN: warm and dry, color normal  NEUROLOGIC: AA&O X3, CN2-12 intact w/ no focal deficits [aside from L eyelid ptosis], no sensory loss, motor Strength 5/5 in UE & LE B/L  Psych: Normal mood and affect, normal behavior

## 2021-10-19 LAB
A1C WITH ESTIMATED AVERAGE GLUCOSE RESULT: 6 % — HIGH (ref 4–5.6)
ALBUMIN SERPL ELPH-MCNC: 3.4 G/DL — SIGNIFICANT CHANGE UP (ref 3.3–5)
ALP SERPL-CCNC: 78 U/L — SIGNIFICANT CHANGE UP (ref 40–120)
ALT FLD-CCNC: 28 U/L — SIGNIFICANT CHANGE UP (ref 12–78)
ANION GAP SERPL CALC-SCNC: 8 MMOL/L — SIGNIFICANT CHANGE UP (ref 5–17)
AST SERPL-CCNC: 20 U/L — SIGNIFICANT CHANGE UP (ref 15–37)
BILIRUB SERPL-MCNC: 0.7 MG/DL — SIGNIFICANT CHANGE UP (ref 0.2–1.2)
BUN SERPL-MCNC: 14 MG/DL — SIGNIFICANT CHANGE UP (ref 7–23)
CALCIUM SERPL-MCNC: 8.9 MG/DL — SIGNIFICANT CHANGE UP (ref 8.5–10.1)
CHLORIDE SERPL-SCNC: 98 MMOL/L — SIGNIFICANT CHANGE UP (ref 96–108)
CO2 SERPL-SCNC: 25 MMOL/L — SIGNIFICANT CHANGE UP (ref 22–31)
COVID-19 SPIKE DOMAIN AB INTERP: POSITIVE
COVID-19 SPIKE DOMAIN ANTIBODY RESULT: >250 U/ML — HIGH
CREAT SERPL-MCNC: 0.86 MG/DL — SIGNIFICANT CHANGE UP (ref 0.5–1.3)
ESTIMATED AVERAGE GLUCOSE: 126 MG/DL — HIGH (ref 68–114)
GLUCOSE SERPL-MCNC: 176 MG/DL — HIGH (ref 70–99)
HCT VFR BLD CALC: 42.7 % — SIGNIFICANT CHANGE UP (ref 39–50)
HGB BLD-MCNC: 14.5 G/DL — SIGNIFICANT CHANGE UP (ref 13–17)
MCHC RBC-ENTMCNC: 32.6 PG — SIGNIFICANT CHANGE UP (ref 27–34)
MCHC RBC-ENTMCNC: 34 GM/DL — SIGNIFICANT CHANGE UP (ref 32–36)
MCV RBC AUTO: 96 FL — SIGNIFICANT CHANGE UP (ref 80–100)
NRBC # BLD: 0 /100 WBCS — SIGNIFICANT CHANGE UP (ref 0–0)
PLATELET # BLD AUTO: 165 K/UL — SIGNIFICANT CHANGE UP (ref 150–400)
POTASSIUM SERPL-MCNC: 4.5 MMOL/L — SIGNIFICANT CHANGE UP (ref 3.5–5.3)
POTASSIUM SERPL-SCNC: 4.5 MMOL/L — SIGNIFICANT CHANGE UP (ref 3.5–5.3)
PROT SERPL-MCNC: 8.2 G/DL — SIGNIFICANT CHANGE UP (ref 6–8.3)
RBC # BLD: 4.45 M/UL — SIGNIFICANT CHANGE UP (ref 4.2–5.8)
RBC # FLD: 15.1 % — HIGH (ref 10.3–14.5)
SARS-COV-2 IGG+IGM SERPL QL IA: >250 U/ML — HIGH
SARS-COV-2 IGG+IGM SERPL QL IA: POSITIVE
SODIUM SERPL-SCNC: 131 MMOL/L — LOW (ref 135–145)
WBC # BLD: 7.67 K/UL — SIGNIFICANT CHANGE UP (ref 3.8–10.5)
WBC # FLD AUTO: 7.67 K/UL — SIGNIFICANT CHANGE UP (ref 3.8–10.5)

## 2021-10-19 PROCEDURE — 99233 SBSQ HOSP IP/OBS HIGH 50: CPT | Mod: GC

## 2021-10-19 RX ADMIN — ATORVASTATIN CALCIUM 40 MILLIGRAM(S): 80 TABLET, FILM COATED ORAL at 21:09

## 2021-10-19 RX ADMIN — PYRIDOSTIGMINE BROMIDE 60 MILLIGRAM(S): 60 SOLUTION ORAL at 05:12

## 2021-10-19 RX ADMIN — PYRIDOSTIGMINE BROMIDE 60 MILLIGRAM(S): 60 SOLUTION ORAL at 11:50

## 2021-10-19 RX ADMIN — IMMUNE GLOBULIN (HUMAN) 50 GRAM(S): 10 INJECTION INTRAVENOUS; SUBCUTANEOUS at 21:07

## 2021-10-19 RX ADMIN — PYRIDOSTIGMINE BROMIDE 60 MILLIGRAM(S): 60 SOLUTION ORAL at 17:22

## 2021-10-19 RX ADMIN — PYRIDOSTIGMINE BROMIDE 60 MILLIGRAM(S): 60 SOLUTION ORAL at 23:44

## 2021-10-19 RX ADMIN — OXCARBAZEPINE 300 MILLIGRAM(S): 300 TABLET, FILM COATED ORAL at 21:09

## 2021-10-19 RX ADMIN — PYRIDOSTIGMINE BROMIDE 60 MILLIGRAM(S): 60 SOLUTION ORAL at 00:41

## 2021-10-19 RX ADMIN — LISINOPRIL 2.5 MILLIGRAM(S): 2.5 TABLET ORAL at 05:12

## 2021-10-19 RX ADMIN — ENOXAPARIN SODIUM 40 MILLIGRAM(S): 100 INJECTION SUBCUTANEOUS at 11:51

## 2021-10-19 RX ADMIN — Medication 1: at 12:27

## 2021-10-19 RX ADMIN — OXCARBAZEPINE 300 MILLIGRAM(S): 300 TABLET, FILM COATED ORAL at 17:22

## 2021-10-19 RX ADMIN — OXCARBAZEPINE 300 MILLIGRAM(S): 300 TABLET, FILM COATED ORAL at 08:20

## 2021-10-19 RX ADMIN — Medication 2.5 MILLIGRAM(S): at 05:12

## 2021-10-19 RX ADMIN — Medication 1: at 08:20

## 2021-10-19 NOTE — PROGRESS NOTE ADULT - PROBLEM SELECTOR PLAN 1
- Ptosis and blurry vision likely exacerbation of myasthenia gravis   - Continue IVIG  daily for 3-5 days. RRT called overnight for possible allergic reaction; however, patient has been responding to IVIG at corrected rate. Will continue  - Continue Mestinon 60 mg qid  -check NIF and VC  -Neurology following, Dr Bonilla; recs appreciated - Ptosis and blurry vision likely exacerbation of myasthenia gravis   - Continue IVIG  daily for 3-5 days. RRT called overnight for possible allergic reaction; however, patient has been responding to IVIG at corrected rate. Will continue  - As per Dr. Bonilla, pt is improving on IVIG so no need for steroids.  - Continue Mestinon 60 mg qid  -check NIF and VC  -Neurology following, Dr Bonilla; recs appreciated

## 2021-10-20 LAB
ALBUMIN SERPL ELPH-MCNC: 3.2 G/DL — LOW (ref 3.3–5)
ALP SERPL-CCNC: 69 U/L — SIGNIFICANT CHANGE UP (ref 40–120)
ALT FLD-CCNC: 26 U/L — SIGNIFICANT CHANGE UP (ref 12–78)
ANION GAP SERPL CALC-SCNC: 6 MMOL/L — SIGNIFICANT CHANGE UP (ref 5–17)
AST SERPL-CCNC: 18 U/L — SIGNIFICANT CHANGE UP (ref 15–37)
BASOPHILS # BLD AUTO: 0.04 K/UL — SIGNIFICANT CHANGE UP (ref 0–0.2)
BASOPHILS NFR BLD AUTO: 0.4 % — SIGNIFICANT CHANGE UP (ref 0–2)
BILIRUB SERPL-MCNC: 0.5 MG/DL — SIGNIFICANT CHANGE UP (ref 0.2–1.2)
BUN SERPL-MCNC: 17 MG/DL — SIGNIFICANT CHANGE UP (ref 7–23)
CALCIUM SERPL-MCNC: 8.6 MG/DL — SIGNIFICANT CHANGE UP (ref 8.5–10.1)
CHLORIDE SERPL-SCNC: 94 MMOL/L — LOW (ref 96–108)
CHLORIDE UR-SCNC: 54 MMOL/L — SIGNIFICANT CHANGE UP
CO2 SERPL-SCNC: 28 MMOL/L — SIGNIFICANT CHANGE UP (ref 22–31)
CREAT ?TM UR-MCNC: 78 MG/DL — SIGNIFICANT CHANGE UP
CREAT SERPL-MCNC: 0.78 MG/DL — SIGNIFICANT CHANGE UP (ref 0.5–1.3)
EOSINOPHIL # BLD AUTO: 0.07 K/UL — SIGNIFICANT CHANGE UP (ref 0–0.5)
EOSINOPHIL NFR BLD AUTO: 0.7 % — SIGNIFICANT CHANGE UP (ref 0–6)
GLUCOSE SERPL-MCNC: 116 MG/DL — HIGH (ref 70–99)
HCT VFR BLD CALC: 40.6 % — SIGNIFICANT CHANGE UP (ref 39–50)
HGB BLD-MCNC: 13.8 G/DL — SIGNIFICANT CHANGE UP (ref 13–17)
IMM GRANULOCYTES NFR BLD AUTO: 0.7 % — SIGNIFICANT CHANGE UP (ref 0–1.5)
LYMPHOCYTES # BLD AUTO: 1.5 K/UL — SIGNIFICANT CHANGE UP (ref 1–3.3)
LYMPHOCYTES # BLD AUTO: 14.8 % — SIGNIFICANT CHANGE UP (ref 13–44)
MCHC RBC-ENTMCNC: 32.9 PG — SIGNIFICANT CHANGE UP (ref 27–34)
MCHC RBC-ENTMCNC: 34 GM/DL — SIGNIFICANT CHANGE UP (ref 32–36)
MCV RBC AUTO: 96.7 FL — SIGNIFICANT CHANGE UP (ref 80–100)
MONOCYTES # BLD AUTO: 0.81 K/UL — SIGNIFICANT CHANGE UP (ref 0–0.9)
MONOCYTES NFR BLD AUTO: 8 % — SIGNIFICANT CHANGE UP (ref 2–14)
NEUTROPHILS # BLD AUTO: 7.67 K/UL — HIGH (ref 1.8–7.4)
NEUTROPHILS NFR BLD AUTO: 75.4 % — SIGNIFICANT CHANGE UP (ref 43–77)
NRBC # BLD: 0 /100 WBCS — SIGNIFICANT CHANGE UP (ref 0–0)
OSMOLALITY UR: 623 MOSM/KG — SIGNIFICANT CHANGE UP (ref 50–1200)
PHOSPHATE 24H UR-MCNC: 64.8 MG/DL — SIGNIFICANT CHANGE UP
PLATELET # BLD AUTO: 161 K/UL — SIGNIFICANT CHANGE UP (ref 150–400)
POTASSIUM SERPL-MCNC: 3.6 MMOL/L — SIGNIFICANT CHANGE UP (ref 3.5–5.3)
POTASSIUM SERPL-SCNC: 3.6 MMOL/L — SIGNIFICANT CHANGE UP (ref 3.5–5.3)
POTASSIUM UR-SCNC: 69.5 MMOL/L — SIGNIFICANT CHANGE UP
PROT ?TM UR-MCNC: 11 MG/DL — SIGNIFICANT CHANGE UP (ref 0–12)
PROT SERPL-MCNC: 8 G/DL — SIGNIFICANT CHANGE UP (ref 6–8.3)
RBC # BLD: 4.2 M/UL — SIGNIFICANT CHANGE UP (ref 4.2–5.8)
RBC # FLD: 15.4 % — HIGH (ref 10.3–14.5)
SODIUM SERPL-SCNC: 128 MMOL/L — LOW (ref 135–145)
SODIUM UR-SCNC: 61 MMOL/L — SIGNIFICANT CHANGE UP
WBC # BLD: 10.16 K/UL — SIGNIFICANT CHANGE UP (ref 3.8–10.5)
WBC # FLD AUTO: 10.16 K/UL — SIGNIFICANT CHANGE UP (ref 3.8–10.5)

## 2021-10-20 PROCEDURE — 99232 SBSQ HOSP IP/OBS MODERATE 35: CPT | Mod: GC

## 2021-10-20 RX ORDER — SODIUM CHLORIDE 9 MG/ML
1000 INJECTION INTRAMUSCULAR; INTRAVENOUS; SUBCUTANEOUS
Refills: 0 | Status: DISCONTINUED | OUTPATIENT
Start: 2021-10-20 | End: 2021-10-21

## 2021-10-20 RX ORDER — SODIUM CHLORIDE 9 MG/ML
2 INJECTION INTRAMUSCULAR; INTRAVENOUS; SUBCUTANEOUS ONCE
Refills: 0 | Status: COMPLETED | OUTPATIENT
Start: 2021-10-20 | End: 2021-10-21

## 2021-10-20 RX ORDER — ONDANSETRON 8 MG/1
4 TABLET, FILM COATED ORAL ONCE
Refills: 0 | Status: COMPLETED | OUTPATIENT
Start: 2021-10-20 | End: 2021-10-20

## 2021-10-20 RX ORDER — ACETAMINOPHEN 500 MG
650 TABLET ORAL EVERY 6 HOURS
Refills: 0 | Status: DISCONTINUED | OUTPATIENT
Start: 2021-10-20 | End: 2021-10-21

## 2021-10-20 RX ORDER — SODIUM CHLORIDE 9 MG/ML
1000 INJECTION INTRAMUSCULAR; INTRAVENOUS; SUBCUTANEOUS
Refills: 0 | Status: DISCONTINUED | OUTPATIENT
Start: 2021-10-20 | End: 2021-10-20

## 2021-10-20 RX ADMIN — OXCARBAZEPINE 300 MILLIGRAM(S): 300 TABLET, FILM COATED ORAL at 13:28

## 2021-10-20 RX ADMIN — SODIUM CHLORIDE 50 MILLILITER(S): 9 INJECTION INTRAMUSCULAR; INTRAVENOUS; SUBCUTANEOUS at 17:36

## 2021-10-20 RX ADMIN — Medication 650 MILLIGRAM(S): at 20:01

## 2021-10-20 RX ADMIN — Medication 2.5 MILLIGRAM(S): at 05:05

## 2021-10-20 RX ADMIN — ONDANSETRON 4 MILLIGRAM(S): 8 TABLET, FILM COATED ORAL at 21:54

## 2021-10-20 RX ADMIN — PYRIDOSTIGMINE BROMIDE 60 MILLIGRAM(S): 60 SOLUTION ORAL at 11:00

## 2021-10-20 RX ADMIN — Medication 650 MILLIGRAM(S): at 10:56

## 2021-10-20 RX ADMIN — PYRIDOSTIGMINE BROMIDE 60 MILLIGRAM(S): 60 SOLUTION ORAL at 05:05

## 2021-10-20 RX ADMIN — ATORVASTATIN CALCIUM 40 MILLIGRAM(S): 80 TABLET, FILM COATED ORAL at 21:02

## 2021-10-20 RX ADMIN — PYRIDOSTIGMINE BROMIDE 60 MILLIGRAM(S): 60 SOLUTION ORAL at 23:09

## 2021-10-20 RX ADMIN — LISINOPRIL 2.5 MILLIGRAM(S): 2.5 TABLET ORAL at 05:05

## 2021-10-20 RX ADMIN — OXCARBAZEPINE 300 MILLIGRAM(S): 300 TABLET, FILM COATED ORAL at 21:02

## 2021-10-20 RX ADMIN — ENOXAPARIN SODIUM 40 MILLIGRAM(S): 100 INJECTION SUBCUTANEOUS at 11:00

## 2021-10-20 RX ADMIN — Medication 650 MILLIGRAM(S): at 13:29

## 2021-10-20 RX ADMIN — OXCARBAZEPINE 300 MILLIGRAM(S): 300 TABLET, FILM COATED ORAL at 09:21

## 2021-10-20 RX ADMIN — IMMUNE GLOBULIN (HUMAN) 50 GRAM(S): 10 INJECTION INTRAVENOUS; SUBCUTANEOUS at 20:45

## 2021-10-20 RX ADMIN — Medication 650 MILLIGRAM(S): at 21:00

## 2021-10-20 RX ADMIN — PYRIDOSTIGMINE BROMIDE 60 MILLIGRAM(S): 60 SOLUTION ORAL at 17:35

## 2021-10-20 NOTE — CONSULT NOTE ADULT - ASSESSMENT
Hyponatremia: Likely pseudohyponatremia for IV IG infusion, ? Increased ADH state  Diabetes  Myasthenia Gravis    Pt started on IV hydration with saline. PO fluid restriction. Check Jonaie, Uosm. Monitor blood sugar levels. Insulin coverage as needed. Dietary restriction. Neurology follow up. Will follow electrolytes and renal function trend.   Further recommendations pending clinical course. Thank you for the courtesy of this referral.  Hyponatremia: Likely pseudohyponatremia for IV IG infusion, ? Increased ADH state  Diabetes  Myasthenia Gravis    Pt started on IV hydration with saline. Will lower IVF rate. PO fluid restriction. Check Joanie, Uosm. Monitor blood sugar levels. Insulin coverage as needed. Dietary restriction. Neurology follow up. Will follow electrolytes and renal function trend.   Further recommendations pending clinical course. Thank you for the courtesy of this referral.

## 2021-10-20 NOTE — PROVIDER CONTACT NOTE (OTHER) - RECOMMENDATIONS
pt. may need Benadryl and Tylenol before administration of IVIG to prevent allergic reaction, provider aware
eval pt
stop IVIG for 1 hour, restart after 1 hour at a lower rate, Zofran ordered for nausea

## 2021-10-20 NOTE — PROGRESS NOTE ADULT - PROBLEM SELECTOR PLAN 9
- chronic, stable  - glu 118 on admission  - not on home medication  - daily CMPs
- chronic, stable  - glu 118 on admission  - not on home medication  - daily CMPs

## 2021-10-20 NOTE — PROVIDER CONTACT NOTE (OTHER) - SITUATION
pt. may need Benadryl and Tylenol before administration of IVIG
c/o dizziness
pt. complains of mild nausea during IVIG infusion, provider made aware.

## 2021-10-20 NOTE — PHYSICAL THERAPY INITIAL EVALUATION ADULT - PERTINENT HX OF CURRENT PROBLEM, REHAB EVAL
Pt is a 77 yo male with pmhx of BPH GERD DM COPD CAD seizures myasthenia gravis here for left eye lid weakness drooping and blurry vision.

## 2021-10-20 NOTE — PROGRESS NOTE ADULT - PROBLEM SELECTOR PLAN 4
- chronic, stable  - continue prednisone 2.5mg qd, advair diskus 250-50, albuterol 90mcg, albuterol 2.5mg/3mL, elipta 62.5mcg/h
- chronic, stable  - continue prednisone 2.5mg qd, advair diskus 250-50, albuterol 90mcg, albuterol 2.5mg/3mL, elipta 62.5mcg/h

## 2021-10-20 NOTE — CONSULT NOTE ADULT - SUBJECTIVE AND OBJECTIVE BOX
Patient is a 76y old  Male who presents with a chief complaint of myasthenia gravis exacerbation (20 Oct 2021 11:32)    HPI:  77 yo M with PMH BPH, GERD. DM, COPD, CAD, seizures, and myasthenia gravis presents to the ED with left eye weakness, drooping and blurry vision that has been ongoing for weeks and has occurred in the past. The patient states he went to his neurologist Dr. Bonilla today and advised to come to the ED for further evaluation. The patient denies drainage or trauma to eyes. Denies headache, dizziness, numbness, tingling, chest pain, SOB, palpitations. He denies drooling. He is compliant with his home mestinon. He takes daily prednisone for COPD.      ED Course:   Vitals: /84, HR 71, Temp 98.8 F, RR 16, SpO2 95% on RA  Labs: plt: 143, Na 133, gluc 118  CXR no infiltrates visualized [official results pending]. (18 Oct 2021 15:11)    Renal consult called for hyponatremia. History obtained from chart and patient.       PAST MEDICAL HISTORY:  CAD (coronary artery disease)    Diabetes mellitus    COPD without exacerbation    Benign prostatic hyperplasia    GERD (gastroesophageal reflux disease)    Myasthenia gravis    Seizures        PAST SURGICAL HISTORY:  No significant past surgical history        FAMILY HISTORY:  FHx: stroke        SOCIAL HISTORY: No smoking or alcohol use     Allergies    No Known Allergies    Intolerances      Home Medications:  Advair Diskus 250 mcg-50 mcg inhalation powder: 1 puff(s) inhaled 2 times a day (18 Oct 2021 17:02)  albuterol 2.5 mg/3 mL (0.083%) inhalation solution: 3 milliliter(s) inhaled every 6 hours (18 Oct 2021 17:02)  albuterol 90 mcg/inh inhalation aerosol: 2 puff(s) inhaled every 6 hours (18 Oct 2021 17:02)  Crestor 10 mg oral tablet: 1 tab(s) orally once a day (18 Oct 2021 17:02)  Incruse Ellipta 62.5 mcg/inh inhalation powder: 1 puff(s) inhaled every 24 hours (18 Oct 2021 17:02)  lisinopril 2.5 mg oral tablet: 1 tab(s) orally once a day (18 Oct 2021 17:02)  OXcarbazepine 300 mg oral tablet: 1 tab(s) orally 3 times a day (18 Oct 2021 17:02)  predniSONE 2.5 mg oral tablet: 1 tab(s) orally once a day (18 Oct 2021 17:02)  pyridostigmine 60 mg oral tablet: 1 tab(s) orally 3 times a day (18 Oct 2021 17:02)    MEDICATIONS  (STANDING):  atorvastatin 40 milliGRAM(s) Oral at bedtime  dextrose 40% Gel 15 Gram(s) Oral once  dextrose 5%. 1000 milliLiter(s) (50 mL/Hr) IV Continuous <Continuous>  dextrose 5%. 1000 milliLiter(s) (100 mL/Hr) IV Continuous <Continuous>  dextrose 50% Injectable 25 Gram(s) IV Push once  dextrose 50% Injectable 12.5 Gram(s) IV Push once  dextrose 50% Injectable 25 Gram(s) IV Push once  enoxaparin Injectable 40 milliGRAM(s) SubCutaneous daily  glucagon  Injectable 1 milliGRAM(s) IntraMuscular once  immune   globulin 10% (GAMMAGARD) IVPB 30 Gram(s) IV Intermittent daily  insulin lispro (ADMELOG) corrective regimen sliding scale   SubCutaneous three times a day before meals  insulin lispro (ADMELOG) corrective regimen sliding scale   SubCutaneous at bedtime  lisinopril 2.5 milliGRAM(s) Oral daily  OXcarbazepine 300 milliGRAM(s) Oral <User Schedule>  predniSONE   Tablet 2.5 milliGRAM(s) Oral daily  pyridostigmine 60 milliGRAM(s) Oral every 6 hours  sodium chloride 0.9%. 1000 milliLiter(s) (75 mL/Hr) IV Continuous <Continuous>    MEDICATIONS  (PRN):  acetaminophen   Tablet .. 650 milliGRAM(s) Oral every 6 hours PRN Mild Pain (1 - 3)  ALBUTerol    90 MICROgram(s) HFA Inhaler 1 Puff(s) Inhalation daily PRN COPD  budesonide 160 MICROgram(s)/formoterol 4.5 MICROgram(s) Inhaler 2 Puff(s) Inhalation two times a day PRN COPD      REVIEW OF SYSTEMS:  General: no distress  Respiratory: No cough, SOB  Cardiovascular: No CP or Palpitations	  Gastrointestinal: No nausea, Vomiting. No diarrhea  Genitourinary: No urinary complaints	  Musculoskeletal: No new rash or lesions	  all other systems negative    T(F): 97.8 (10-20-21 @ 11:55), Max: 98 (10-19-21 @ 21:04)  HR: 54 (10-20-21 @ 11:55) (54 - 72)  BP: 133/74 (10-20-21 @ 11:55) (112/66 - 133/74)  RR: 18 (10-20-21 @ 11:55) (18 - 18)  SpO2: 96% (10-20-21 @ 11:55) (93% - 96%)  Wt(kg): --    PHYSICAL EXAM:  General: NAD  Respiratory: b/l air entry  Cardiovascular: S1 S2  Gastrointestinal: soft  Extremities: no edema        10-20    128<L>  |  94<L>  |  17  ----------------------------<  116<H>  3.6   |  28  |  0.78    Ca    8.6      20 Oct 2021 08:50    TPro  8.0  /  Alb  3.2<L>  /  TBili  0.5  /  DBili  x   /  AST  18  /  ALT  26  /  AlkPhos  69  10-20                          13.8   10.16 )-----------( 161      ( 20 Oct 2021 08:50 )             40.6       Hemoglobin: 13.8 g/dL (10-20 @ 08:50)  Hematocrit: 40.6 % (10-20 @ 08:50)  Blood Urea Nitrogen, Serum: 17 mg/dL (10-20 @ 08:50)  Calcium, Total Serum: 8.6 mg/dL (10-20 @ 08:50)      Creatinine, Serum: 0.78 (10-20 @ 08:50)  Creatinine, Serum: 0.86 (10-19 @ 08:50)  Creatinine, Serum: 0.70 (10-18 @ 14:17)        LIVER FUNCTIONS - ( 20 Oct 2021 08:50 )  Alb: 3.2 g/dL / Pro: 8.0 g/dL / ALK PHOS: 69 U/L / ALT: 26 U/L / AST: 18 U/L / GGT: x               I&O's Detail    19 Oct 2021 07:01  -  20 Oct 2021 07:00  --------------------------------------------------------  IN:    IV PiggyBack: 300 mL  Total IN: 300 mL    OUT:  Total OUT: 0 mL    Total NET: 300 mL        < from: Xray Chest 1 View- PORTABLE-Urgent (Xray Chest 1 View- PORTABLE-Urgent .) (10.18.21 @ 23:49) >    EXAM:  XR CHEST PORTABLE URGENT 1V                          EXAM:  XR CHEST PA LAT 2V                            PROCEDURE DATE:  10/18/2021          INTERPRETATION:  PA and lateral chest on October 18, 2021 at 1:23 PM. 2 frontal images. Patient isshort of breath and has myasthenia gravis.    Heart size is within normal limits.    COPD hyperexpansion of the lungs again noted.    On August 8, 2019 there is an irregular right upper lobe infiltrate which is healed with minimal scar.    Follow-up AP chest, semierect, on October 18, 2021 11:35 PM.    2 images.    There is a persistent slight right upper lobe scar.    IMPRESSION: COPD and residual slight right upper lobe scar. No acute finding.    --- End of Report ---            BOSSMAN MARTINEZ MD; Attending Radiologist  This document has been electronically signed. Oct 19 2021 10:57AM    < end of copied text >       Patient is a 76y old  Male who presents with a chief complaint of myasthenia gravis exacerbation (20 Oct 2021 11:32)    HPI:  77 yo M with PMH BPH, GERD. DM, COPD, CAD, seizures, and myasthenia gravis presents to the ED with left eye weakness, drooping and blurry vision that has been ongoing for weeks and has occurred in the past. The patient states he went to his neurologist Dr. Bonilla today and advised to come to the ED for further evaluation. The patient denies drainage or trauma to eyes. Denies headache, dizziness, numbness, tingling, chest pain, SOB, palpitations. He denies drooling. He is compliant with his home mestinon. He takes daily prednisone for COPD.      ED Course:   Vitals: /84, HR 71, Temp 98.8 F, RR 16, SpO2 95% on RA  Labs: plt: 143, Na 133, gluc 118  CXR no infiltrates visualized [official results pending]. (18 Oct 2021 15:11)    Renal consult called for hyponatremia. History obtained from chart and patient.       PAST MEDICAL HISTORY:  CAD (coronary artery disease)    Diabetes mellitus    COPD without exacerbation    Benign prostatic hyperplasia    GERD (gastroesophageal reflux disease)    Myasthenia gravis    Seizures        PAST SURGICAL HISTORY:  No significant past surgical history        FAMILY HISTORY:  FHx: stroke        SOCIAL HISTORY: Ex smoker, no alcohol use     Allergies    No Known Allergies    Intolerances      Home Medications:  Advair Diskus 250 mcg-50 mcg inhalation powder: 1 puff(s) inhaled 2 times a day (18 Oct 2021 17:02)  albuterol 2.5 mg/3 mL (0.083%) inhalation solution: 3 milliliter(s) inhaled every 6 hours (18 Oct 2021 17:02)  albuterol 90 mcg/inh inhalation aerosol: 2 puff(s) inhaled every 6 hours (18 Oct 2021 17:02)  Crestor 10 mg oral tablet: 1 tab(s) orally once a day (18 Oct 2021 17:02)  Incruse Ellipta 62.5 mcg/inh inhalation powder: 1 puff(s) inhaled every 24 hours (18 Oct 2021 17:02)  lisinopril 2.5 mg oral tablet: 1 tab(s) orally once a day (18 Oct 2021 17:02)  OXcarbazepine 300 mg oral tablet: 1 tab(s) orally 3 times a day (18 Oct 2021 17:02)  predniSONE 2.5 mg oral tablet: 1 tab(s) orally once a day (18 Oct 2021 17:02)  pyridostigmine 60 mg oral tablet: 1 tab(s) orally 3 times a day (18 Oct 2021 17:02)    MEDICATIONS  (STANDING):  atorvastatin 40 milliGRAM(s) Oral at bedtime  dextrose 40% Gel 15 Gram(s) Oral once  dextrose 5%. 1000 milliLiter(s) (50 mL/Hr) IV Continuous <Continuous>  dextrose 5%. 1000 milliLiter(s) (100 mL/Hr) IV Continuous <Continuous>  dextrose 50% Injectable 25 Gram(s) IV Push once  dextrose 50% Injectable 12.5 Gram(s) IV Push once  dextrose 50% Injectable 25 Gram(s) IV Push once  enoxaparin Injectable 40 milliGRAM(s) SubCutaneous daily  glucagon  Injectable 1 milliGRAM(s) IntraMuscular once  immune   globulin 10% (GAMMAGARD) IVPB 30 Gram(s) IV Intermittent daily  insulin lispro (ADMELOG) corrective regimen sliding scale   SubCutaneous three times a day before meals  insulin lispro (ADMELOG) corrective regimen sliding scale   SubCutaneous at bedtime  lisinopril 2.5 milliGRAM(s) Oral daily  OXcarbazepine 300 milliGRAM(s) Oral <User Schedule>  predniSONE   Tablet 2.5 milliGRAM(s) Oral daily  pyridostigmine 60 milliGRAM(s) Oral every 6 hours  sodium chloride 0.9%. 1000 milliLiter(s) (75 mL/Hr) IV Continuous <Continuous>    MEDICATIONS  (PRN):  acetaminophen   Tablet .. 650 milliGRAM(s) Oral every 6 hours PRN Mild Pain (1 - 3)  ALBUTerol    90 MICROgram(s) HFA Inhaler 1 Puff(s) Inhalation daily PRN COPD  budesonide 160 MICROgram(s)/formoterol 4.5 MICROgram(s) Inhaler 2 Puff(s) Inhalation two times a day PRN COPD      REVIEW OF SYSTEMS:  General: no distress  Respiratory: No cough, SOB  Cardiovascular: No CP or Palpitations	  Gastrointestinal: No nausea, Vomiting. No diarrhea  Genitourinary: No urinary complaints	  Musculoskeletal: No new rash or lesions	  all other systems negative    T(F): 97.8 (10-20-21 @ 11:55), Max: 98 (10-19-21 @ 21:04)  HR: 54 (10-20-21 @ 11:55) (54 - 72)  BP: 133/74 (10-20-21 @ 11:55) (112/66 - 133/74)  RR: 18 (10-20-21 @ 11:55) (18 - 18)  SpO2: 96% (10-20-21 @ 11:55) (93% - 96%)  Wt(kg): --    PHYSICAL EXAM:  General: NAD  Respiratory: b/l air entry  Cardiovascular: S1 S2  Gastrointestinal: soft  Extremities: no edema        10-20    128<L>  |  94<L>  |  17  ----------------------------<  116<H>  3.6   |  28  |  0.78    Ca    8.6      20 Oct 2021 08:50    TPro  8.0  /  Alb  3.2<L>  /  TBili  0.5  /  DBili  x   /  AST  18  /  ALT  26  /  AlkPhos  69  10-20                          13.8   10.16 )-----------( 161      ( 20 Oct 2021 08:50 )             40.6       Hemoglobin: 13.8 g/dL (10-20 @ 08:50)  Hematocrit: 40.6 % (10-20 @ 08:50)  Blood Urea Nitrogen, Serum: 17 mg/dL (10-20 @ 08:50)  Calcium, Total Serum: 8.6 mg/dL (10-20 @ 08:50)      Creatinine, Serum: 0.78 (10-20 @ 08:50)  Creatinine, Serum: 0.86 (10-19 @ 08:50)  Creatinine, Serum: 0.70 (10-18 @ 14:17)        LIVER FUNCTIONS - ( 20 Oct 2021 08:50 )  Alb: 3.2 g/dL / Pro: 8.0 g/dL / ALK PHOS: 69 U/L / ALT: 26 U/L / AST: 18 U/L / GGT: x               I&O's Detail    19 Oct 2021 07:01  -  20 Oct 2021 07:00  --------------------------------------------------------  IN:    IV PiggyBack: 300 mL  Total IN: 300 mL    OUT:  Total OUT: 0 mL    Total NET: 300 mL        < from: Xray Chest 1 View- PORTABLE-Urgent (Xray Chest 1 View- PORTABLE-Urgent .) (10.18.21 @ 23:49) >    EXAM:  XR CHEST PORTABLE URGENT 1V                          EXAM:  XR CHEST PA LAT 2V                            PROCEDURE DATE:  10/18/2021          INTERPRETATION:  PA and lateral chest on October 18, 2021 at 1:23 PM. 2 frontal images. Patient isshort of breath and has myasthenia gravis.    Heart size is within normal limits.    COPD hyperexpansion of the lungs again noted.    On August 8, 2019 there is an irregular right upper lobe infiltrate which is healed with minimal scar.    Follow-up AP chest, semierect, on October 18, 2021 11:35 PM.    2 images.    There is a persistent slight right upper lobe scar.    IMPRESSION: COPD and residual slight right upper lobe scar. No acute finding.    --- End of Report ---            BOSSMAN MARTINEZ MD; Attending Radiologist  This document has been electronically signed. Oct 19 2021 10:57AM    < end of copied text >

## 2021-10-20 NOTE — PROGRESS NOTE ADULT - PROBLEM SELECTOR PLAN 5
- continue oxcarbazpine 300 mg tid  -seizure precautions
- continue oxcarbazpine 300 mg tid  -seizure precautions

## 2021-10-20 NOTE — PROGRESS NOTE ADULT - PROBLEM SELECTOR PLAN 8
- chronic, stable  -not on any home medications.   -f/u outpatient with PCP
- chronic, stable  -not on any home medications.   -f/u outpatient with PCP

## 2021-10-20 NOTE — PROGRESS NOTE ADULT - PROBLEM SELECTOR PLAN 2
- Na 133 on admission, Na 131 this morning   - no signs or sx of hyponatremia   - monitor with daily CMPs  - check urine electrolytes and osmolality.
- Since admission, Na (133->131->128 today)  - Pt complains of headache today. Likely 2/2 hyponatremia.  - monitor with daily CMPs  - Urine electrolytes and osmolality wnl.

## 2021-10-20 NOTE — PROGRESS NOTE ADULT - PROBLEM SELECTOR PLAN 3
Resolved  - platelets 143 on admission, platelets 161 on 10/20  - no signs or sx of bleeding  - monitor daily CBCs
- platelets 143 on admission, platelets 165 on 10/19  - no signs or sx of bleeding  - monitor daily CBCs

## 2021-10-20 NOTE — CHART NOTE - NSCHARTNOTEFT_GEN_A_CORE
Called by RN for patient c/o nausea during IVIG transfusion. Patient seen and evaluated at the bedside. Patient c/o mild nausea which he attributes to overeating dinner. Denies fevers, chills, rigors, chest pain, SOB, vomiting.   - Pause IVIG infusion   - Zofran 40mg IV STAT  - If nausea resolves, resume IVIG in 1 hour starting @ 24cc/hr  - RN to call with any changes  - Will follow

## 2021-10-20 NOTE — PROGRESS NOTE ADULT - ATTENDING COMMENTS
75 yo M with PMH BPH, GERD. DM, COPD, CAD, seizures, brain hemagioma s/p laser treatment in 2008, and myasthenia gravis presents to the ED with left eye weakness, drooping and blurry vision, admitted for myasthenia gravis exacerbation.    : Myasthenia gravis.   - Ptosis and blurry vision likely exacerbation of myasthenia gravis   - Today 10/20 last day of IVIG  d/c planning
The patient was seen and evaluated independently by the attending physician.  - c/w ivig  - symptoms improving  - suspect etiology of symptoms is nonadherence to mestinon

## 2021-10-20 NOTE — PROGRESS NOTE ADULT - PROBLEM SELECTOR PLAN 1
- Ptosis and blurry vision likely exacerbation of myasthenia gravis   - Today 10/20 last day of IVIG. RRT called overnight for possible allergic reaction; however, patient has been responding to IVIG at corrected rate. Will continue  - As per Dr. Bonilla, pt is improving on IVIG so no need for steroids.  - Continue Mestinon 60 mg qid  -check NIF and VC  -Neurology following, Dr Bonilla; recs appreciated

## 2021-10-20 NOTE — PROVIDER CONTACT NOTE (OTHER) - REASON
pt. may need Benadryl and Tylenol before administration of IVIG
c/o dizziness
pt. complains of mild nausea during IVIG infusion

## 2021-10-20 NOTE — PROVIDER CONTACT NOTE (OTHER) - DATE AND TIME:
Returning call for test results. Annotated on report. Pt advised  
20-Oct-2021 06:15
20-Oct-2021 21:25
20-Oct-2021 19:15

## 2021-10-21 ENCOUNTER — TRANSCRIPTION ENCOUNTER (OUTPATIENT)
Age: 76
End: 2021-10-21

## 2021-10-21 VITALS
RESPIRATION RATE: 18 BRPM | HEART RATE: 57 BPM | DIASTOLIC BLOOD PRESSURE: 74 MMHG | SYSTOLIC BLOOD PRESSURE: 130 MMHG | TEMPERATURE: 98 F | OXYGEN SATURATION: 95 %

## 2021-10-21 LAB
ALBUMIN SERPL ELPH-MCNC: 2.8 G/DL — LOW (ref 3.3–5)
ALP SERPL-CCNC: 64 U/L — SIGNIFICANT CHANGE UP (ref 40–120)
ALT FLD-CCNC: 24 U/L — SIGNIFICANT CHANGE UP (ref 12–78)
ANION GAP SERPL CALC-SCNC: 4 MMOL/L — LOW (ref 5–17)
AST SERPL-CCNC: 17 U/L — SIGNIFICANT CHANGE UP (ref 15–37)
BILIRUB SERPL-MCNC: 0.4 MG/DL — SIGNIFICANT CHANGE UP (ref 0.2–1.2)
BUN SERPL-MCNC: 20 MG/DL — SIGNIFICANT CHANGE UP (ref 7–23)
CALCIUM SERPL-MCNC: 8.5 MG/DL — SIGNIFICANT CHANGE UP (ref 8.5–10.1)
CHLORIDE SERPL-SCNC: 99 MMOL/L — SIGNIFICANT CHANGE UP (ref 96–108)
CO2 SERPL-SCNC: 29 MMOL/L — SIGNIFICANT CHANGE UP (ref 22–31)
CREAT SERPL-MCNC: 0.75 MG/DL — SIGNIFICANT CHANGE UP (ref 0.5–1.3)
GLUCOSE SERPL-MCNC: 96 MG/DL — SIGNIFICANT CHANGE UP (ref 70–99)
HCT VFR BLD CALC: 36.6 % — LOW (ref 39–50)
HGB BLD-MCNC: 12.5 G/DL — LOW (ref 13–17)
MCHC RBC-ENTMCNC: 32.6 PG — SIGNIFICANT CHANGE UP (ref 27–34)
MCHC RBC-ENTMCNC: 34.2 GM/DL — SIGNIFICANT CHANGE UP (ref 32–36)
MCV RBC AUTO: 95.3 FL — SIGNIFICANT CHANGE UP (ref 80–100)
NRBC # BLD: 0 /100 WBCS — SIGNIFICANT CHANGE UP (ref 0–0)
OSMOLALITY UR: 444 MOSM/KG — SIGNIFICANT CHANGE UP (ref 50–1200)
PLATELET # BLD AUTO: 129 K/UL — LOW (ref 150–400)
POTASSIUM SERPL-MCNC: 3.7 MMOL/L — SIGNIFICANT CHANGE UP (ref 3.5–5.3)
POTASSIUM SERPL-SCNC: 3.7 MMOL/L — SIGNIFICANT CHANGE UP (ref 3.5–5.3)
PROT SERPL-MCNC: 7.6 G/DL — SIGNIFICANT CHANGE UP (ref 6–8.3)
RBC # BLD: 3.84 M/UL — LOW (ref 4.2–5.8)
RBC # FLD: 15.3 % — HIGH (ref 10.3–14.5)
SODIUM SERPL-SCNC: 132 MMOL/L — LOW (ref 135–145)
SODIUM UR-SCNC: 79 MMOL/L — SIGNIFICANT CHANGE UP
URATE SERPL-MCNC: 4.2 MG/DL — SIGNIFICANT CHANGE UP (ref 3.4–8.8)
WBC # BLD: 6.03 K/UL — SIGNIFICANT CHANGE UP (ref 3.8–10.5)
WBC # FLD AUTO: 6.03 K/UL — SIGNIFICANT CHANGE UP (ref 3.8–10.5)

## 2021-10-21 PROCEDURE — 93005 ELECTROCARDIOGRAM TRACING: CPT

## 2021-10-21 PROCEDURE — 71045 X-RAY EXAM CHEST 1 VIEW: CPT

## 2021-10-21 PROCEDURE — 80053 COMPREHEN METABOLIC PANEL: CPT

## 2021-10-21 PROCEDURE — 85025 COMPLETE CBC W/AUTO DIFF WBC: CPT

## 2021-10-21 PROCEDURE — 83935 ASSAY OF URINE OSMOLALITY: CPT

## 2021-10-21 PROCEDURE — 99239 HOSP IP/OBS DSCHRG MGMT >30: CPT | Mod: GC

## 2021-10-21 PROCEDURE — 94760 N-INVAS EAR/PLS OXIMETRY 1: CPT

## 2021-10-21 PROCEDURE — 36415 COLL VENOUS BLD VENIPUNCTURE: CPT

## 2021-10-21 PROCEDURE — 84156 ASSAY OF PROTEIN URINE: CPT

## 2021-10-21 PROCEDURE — 83036 HEMOGLOBIN GLYCOSYLATED A1C: CPT

## 2021-10-21 PROCEDURE — 82436 ASSAY OF URINE CHLORIDE: CPT

## 2021-10-21 PROCEDURE — 97162 PT EVAL MOD COMPLEX 30 MIN: CPT

## 2021-10-21 PROCEDURE — 84550 ASSAY OF BLOOD/URIC ACID: CPT

## 2021-10-21 PROCEDURE — 82570 ASSAY OF URINE CREATININE: CPT

## 2021-10-21 PROCEDURE — 94640 AIRWAY INHALATION TREATMENT: CPT

## 2021-10-21 PROCEDURE — 84133 ASSAY OF URINE POTASSIUM: CPT

## 2021-10-21 PROCEDURE — 84105 ASSAY OF URINE PHOSPHORUS: CPT

## 2021-10-21 PROCEDURE — 99285 EMERGENCY DEPT VISIT HI MDM: CPT | Mod: 25

## 2021-10-21 PROCEDURE — 85027 COMPLETE CBC AUTOMATED: CPT

## 2021-10-21 PROCEDURE — 84300 ASSAY OF URINE SODIUM: CPT

## 2021-10-21 PROCEDURE — 87635 SARS-COV-2 COVID-19 AMP PRB: CPT

## 2021-10-21 PROCEDURE — 82962 GLUCOSE BLOOD TEST: CPT

## 2021-10-21 PROCEDURE — 71046 X-RAY EXAM CHEST 2 VIEWS: CPT

## 2021-10-21 PROCEDURE — 86769 SARS-COV-2 COVID-19 ANTIBODY: CPT

## 2021-10-21 PROCEDURE — 94150 VITAL CAPACITY TEST: CPT

## 2021-10-21 RX ORDER — SODIUM CHLORIDE 9 MG/ML
1 INJECTION INTRAMUSCULAR; INTRAVENOUS; SUBCUTANEOUS
Qty: 3 | Refills: 0
Start: 2021-10-21 | End: 2021-10-23

## 2021-10-21 RX ORDER — PYRIDOSTIGMINE BROMIDE 60 MG/5ML
1 SOLUTION ORAL
Qty: 0 | Refills: 0 | DISCHARGE

## 2021-10-21 RX ORDER — PYRIDOSTIGMINE BROMIDE 60 MG/5ML
1 SOLUTION ORAL
Qty: 180 | Refills: 0
Start: 2021-10-21 | End: 2021-11-19

## 2021-10-21 RX ADMIN — Medication 650 MILLIGRAM(S): at 08:30

## 2021-10-21 RX ADMIN — ENOXAPARIN SODIUM 40 MILLIGRAM(S): 100 INJECTION SUBCUTANEOUS at 11:36

## 2021-10-21 RX ADMIN — Medication 1: at 08:31

## 2021-10-21 RX ADMIN — LISINOPRIL 2.5 MILLIGRAM(S): 2.5 TABLET ORAL at 05:22

## 2021-10-21 RX ADMIN — SODIUM CHLORIDE 2 GRAM(S): 9 INJECTION INTRAMUSCULAR; INTRAVENOUS; SUBCUTANEOUS at 09:36

## 2021-10-21 RX ADMIN — PYRIDOSTIGMINE BROMIDE 60 MILLIGRAM(S): 60 SOLUTION ORAL at 05:22

## 2021-10-21 RX ADMIN — OXCARBAZEPINE 300 MILLIGRAM(S): 300 TABLET, FILM COATED ORAL at 13:43

## 2021-10-21 RX ADMIN — PYRIDOSTIGMINE BROMIDE 60 MILLIGRAM(S): 60 SOLUTION ORAL at 11:35

## 2021-10-21 RX ADMIN — Medication 650 MILLIGRAM(S): at 07:36

## 2021-10-21 RX ADMIN — SODIUM CHLORIDE 50 MILLILITER(S): 9 INJECTION INTRAMUSCULAR; INTRAVENOUS; SUBCUTANEOUS at 07:36

## 2021-10-21 RX ADMIN — OXCARBAZEPINE 300 MILLIGRAM(S): 300 TABLET, FILM COATED ORAL at 07:35

## 2021-10-21 RX ADMIN — Medication 2.5 MILLIGRAM(S): at 05:30

## 2021-10-21 NOTE — DISCHARGE NOTE PROVIDER - NSDCCPCAREPLAN_GEN_ALL_CORE_FT
PRINCIPAL DISCHARGE DIAGNOSIS  Diagnosis: Myasthenia gravis  Assessment and Plan of Treatment: You were admitted for amyasthenia gravis flareup. You were started on IVIG infusions for 3 days. Please start taking pyridostygmine as directed, you should start taking it evewry 4 hours. Please continue prednisone as directed. Please see your PCP and neurologist within 1 week of discharge      SECONDARY DISCHARGE DIAGNOSES  Diagnosis: Hyponatremia  Assessment and Plan of Treatment: Your sodium was low on admission. this was thought to be a false decrease in your sodium levels due to IVIG. You were treated with IV fluids. Your Na level on discharge was 132. Please take salt tabs for 3 days. Please see your PCP early next week and have labwork done to check your sodium levels.    Diagnosis: Diabetes mellitus  Assessment and Plan of Treatment: You have a known history of diabetes prior to your admission. This is a disorder that disrupts the way your body uses sugar. All the cells in your body need sugar to work normally. Sugar gets into the cells with the help of a hormone called insulin. If there is not enough insulin, or if the body stops responding to insulin, sugar builds up in the blood. Uncontrolled blood sugar levels can lead to kidney and heart damage, pain/numbness/paralysis in your hands and feel and increased risk of infections. Continue your diabetes medication to control your blood sugar and prevent the possible complications from this disease. Follow up with your PCP, podiatrist, ophthalmologist on a regular basis.      Diagnosis: COPD without exacerbation  Assessment and Plan of Treatment: You have a known history of chronic obstructive pulmonary disease (COPD). It is caused by lung damage from chronic irritation and inflammation from smoking. COPD is a serious condition that can get worse over time. Exacerbations are treated with steroids and antibiotics, in addition to the common treatments for COPD which often invole inhalers and nebulizers. There are measures you can take to help you feel better and prevent exacerbations. These measures include smoking cessation (if you have not already), avoiding others who smoke, exercising for at least 20 minutes a day, annual influenza  vaccine and a pneumonia vaccine (if you have not received this already). When you feel short of breath, take a deep breath through your nose and slowly breathe out through your mouth with your lips pursed for twice as long as you inhaled. Continue to take your medications  to prevent future exacerbations. Please return to the ED should you have symptoms such as, but not limited to, severe shortness of breath, wheezing, cough, coughing up blood, vomiting associated with cough, chest pain. Follow up with your PCP and/or pulmonologist.      Diagnosis: Hypertension  Assessment and Plan of Treatment: You have a known history of hypertension, the medical term for high blood pressure. Blood pressure refers to the pressure that blood applies to the inner walls of the arteries. Arteries carry blood from the heart to other organs and parts of the body. Hypertension does not usually cause any symptoms which can make it difficult to identify and can make people think it is not dangerous. Treatment of hypertension usually begins with lifestyle changes, such as reducing the amount of salt in your diet, losing weight if you are overweight or obese, avoiding drinking too much alcohol, stopping smoking and exercising at least 30 minutes per day most days of the week. Untreated high blood pressure increases the strain on the heart and arteries, eventually causing organ damage. High blood pressure also increases the risk of heart failure, heart attack (myocardial infarction), stroke, and kidney failure. Continue to take your medications to prevent the possible complications of uncontrolled hypertension.   Please also follow up with your primary care physician on a regular basis to make sure your blood pressure continues to be well controlled.  If you experience symptoms such as but not limited to: sudden onset blurry vision, nausea, vomiting, chest pain, shortness of breath, or palpitations, please seek medical attention.      Diagnosis: Seizures  Assessment and Plan of Treatment: A seizure is abnormal electrical activity in the brain; the specific cause may or may not be found. Prior to a seizure you may experience a warning sensation (aura) that may include fear, nausea, dizziness, and visual changes such as flashing lights of spots. Common symptoms during the seizure may include an altered mental status, rhythmic jerking movements, drooling, grunting, loss of bladder or bowel control, or tongue biting. After a seizure, you may feel confused and sleepy. Please continue oxycarbazapine  Do not swim, drive, operate machinery, or engage in any risky activity during which a seizure could cause further injury to you or others. Teach friends and family what to do if you HAVE a seizure which includes laying you on the ground with your head on a cushion and turning you to the side to keep your breathing passages clear in case of vomiting.  SEEK IMMEDIATE MEDICAL CARE IF YOU HAVE ANY OF THE FOLLOWING SYMPTOMS: seizure lasting over 5 minutes, not waking up or persistent altered mental status after the seizure, or more frequent or worsening seizures.

## 2021-10-21 NOTE — DISCHARGE NOTE PROVIDER - HOSPITAL COURSE
ADMISSION DATE:  10-18-21    ---  FROM ADMISSION H+P:   HPI:  77 yo M with PMH BPH, GERD. DM, COPD, CAD, seizures, and myasthenia gravis presents to the ED with left eye weakness, drooping and blurry vision that has been ongoing for weeks and has occurred in the past. The patient states he went to his neurologist Dr. Bonilla today and advised to come to the ED for further evaluation. The patient denies drainage or trauma to eyes. Denies headache, dizziness, numbness, tingling, chest pain, SOB, palpitations. He denies drooling. He is compliant with his home mestinon. He takes daily prednisone for COPD.      ED Course:   Vitals: /84, HR 71, Temp 98.8 F, RR 16, SpO2 95% on RA  Labs: plt: 143, Na 133, gluc 118  CXR no infiltrates visualized [official results pending]      (18 Oct 2021 15:11)      ---  HOSPITAL COURSE/PERTINENT LABS/PROCEDURES PERFORMED/PENDING TESTS:  Patient was admitted for management of myasthenia gravis flare. He was evaluated by neurology, who started him on 3 days of IVIG. He was continued on his pyridostigmine. Upon discussion with patient and neurology, it was recommended that the frequency of pyridostigmine be increased from q6h to q4h to prevent further flareups. Patient was noted to be hyponatremic (128--->133); he was evaluated by nephrology. He was started on normal saline. The hyponatremia was thought to be pseudohyponatremia due to IVIG. Patient has been medically optimized and is stable for discharge home with close outpatient followup.   ---  PATIENT CONDITION:  - stable    ---  PHYSICAL EXAM ON DAY OF DISCHARGE:  T(C): 36.6 (10-21-21 @ 04:41), Max: 36.7 (10-20-21 @ 20:13)  HR: 65 (10-21-21 @ 07:29) (54 - 69)  BP: 135/73 (10-21-21 @ 04:41) (124/72 - 167/82)  RR: 18 (10-21-21 @ 04:41) (18 - 18)  SpO2: 93% (10-21-21 @ 04:41) (93% - 96%)    GENERAL: patient appears well, no acute distress, appropriate, pleasant  EYES: sclera clear, no exudates, left eye ptosis noted   ENMT: oropharynx clear without erythema, no exudates, moist mucous membranes  NECK: supple, soft, no thyromegaly noted  LUNGS: good air entry bilaterally, clear to auscultation, symmetric breath sounds, no wheezing or rhonchi appreciated  HEART: soft S1/S2, regular rate and rhythm, no murmurs noted, no lower extremity edema  GASTROINTESTINAL: abdomen is soft, nontender, nondistended, normoactive bowel sounds, no palpable masses  INTEGUMENT: good skin turgor, warm skin, appears well perfused  MUSCULOSKELETAL: no clubbing or cyanosis, no obvious deformity  NEUROLOGIC: awake, alert, oriented x3, good muscle tone in 4 extremities, no obvious sensory deficits    ---  CONSULTANTS:   Nephro: Dr Coates   Neurology: Dr Bonilla     ---  ADVANCED CARE PLANNING:  - Code status:    full code   - MOLST completed:      [ x ] NO     [  ] YES    ---  TIME SPENT:  I, the attending physician, was physically present for the key portions of the evaluation and management (E/M) service provided. The total amount of time spent reviewing the hospital notes, laboratory values, imaging findings, assessing/counseling the patient, discussing with consultant physicians, social work, nursing staff was -- minutes ADMISSION DATE:  10-18-21    ---  FROM ADMISSION H+P:   HPI:  77 yo M with PMH BPH, GERD. DM, COPD, CAD, seizures, and myasthenia gravis presents to the ED with left eye weakness, drooping and blurry vision that has been ongoing for weeks and has occurred in the past. The patient states he went to his neurologist Dr. Bonilla today and advised to come to the ED for further evaluation. The patient denies drainage or trauma to eyes. Denies headache, dizziness, numbness, tingling, chest pain, SOB, palpitations. He denies drooling. He is compliant with his home mestinon. He takes daily prednisone for COPD.      ED Course:   Vitals: /84, HR 71, Temp 98.8 F, RR 16, SpO2 95% on RA  Labs: plt: 143, Na 133, gluc 118  CXR no infiltrates visualized [official results pending]      (18 Oct 2021 15:11)      ---  HOSPITAL COURSE/PERTINENT LABS/PROCEDURES PERFORMED/PENDING TESTS:  Patient was admitted for management of myasthenia gravis flare. He was evaluated by neurology, who started him on 3 days of IVIG. He was continued on his pyridostigmine. Upon discussion with patient and neurology, it was recommended that the frequency of pyridostigmine be increased from q6h to q4h to prevent further flareups. Patient was noted to be hyponatremic (128--->133); he was evaluated by nephrology. He was started on normal saline. The hyponatremia was thought to be pseudohyponatremia due to IVIG. Patient has been medically optimized and is stable for discharge home with close outpatient followup.   ---  PATIENT CONDITION:  - stable    ---  PHYSICAL EXAM ON DAY OF DISCHARGE:  T(C): 36.6 (10-21-21 @ 04:41), Max: 36.7 (10-20-21 @ 20:13)  HR: 65 (10-21-21 @ 07:29) (54 - 69)  BP: 135/73 (10-21-21 @ 04:41) (124/72 - 167/82)  RR: 18 (10-21-21 @ 04:41) (18 - 18)  SpO2: 93% (10-21-21 @ 04:41) (93% - 96%)    GENERAL: patient appears well, no acute distress, appropriate, pleasant  EYES: sclera clear, no exudates, left eye ptosis noted   ENMT: oropharynx clear without erythema, no exudates, moist mucous membranes  NECK: supple, soft, no thyromegaly noted  LUNGS: good air entry bilaterally, clear to auscultation, symmetric breath sounds, no wheezing or rhonchi appreciated  HEART: soft S1/S2, regular rate and rhythm, no murmurs noted, no lower extremity edema  GASTROINTESTINAL: abdomen is soft, nontender, nondistended, normoactive bowel sounds, no palpable masses  INTEGUMENT: good skin turgor, warm skin, appears well perfused  MUSCULOSKELETAL: no clubbing or cyanosis, no obvious deformity  NEUROLOGIC: awake, alert, oriented x3, good muscle tone in 4 extremities, no obvious sensory deficits    ---  CONSULTANTS:   Nephro: Dr Coates   Neurology: Dr Bonilla     ---  ADVANCED CARE PLANNING:  - Code status:    full code   - MOLST completed:      [ x ] NO     [  ] YES    ---  TIME SPENT:42 min  I, the attending physician, was physically present for the key portions of the evaluation and management (E/M) service provided. The total amount of time spent reviewing the hospital notes, laboratory values, imaging findings, assessing/counseling the patient, discussing with consultant physicians, social work, nursing staff was -- minutes

## 2021-10-21 NOTE — DISCHARGE NOTE PROVIDER - NSDCMRMEDTOKEN_GEN_ALL_CORE_FT
Advair Diskus 250 mcg-50 mcg inhalation powder: 1 puff(s) inhaled 2 times a day  albuterol 2.5 mg/3 mL (0.083%) inhalation solution: 3 milliliter(s) inhaled every 6 hours  albuterol 90 mcg/inh inhalation aerosol: 2 puff(s) inhaled every 6 hours  Crestor 10 mg oral tablet: 1 tab(s) orally once a day  Incruse Ellipta 62.5 mcg/inh inhalation powder: 1 puff(s) inhaled every 24 hours  lisinopril 2.5 mg oral tablet: 1 tab(s) orally once a day  OXcarbazepine 300 mg oral tablet: 1 tab(s) orally 3 times a day  predniSONE 2.5 mg oral tablet: 1 tab(s) orally once a day  pyridostigmine 60 mg oral tablet: 1 tab(s) orally every 4 hours   sodium chloride 1 g oral tablet: 1 tab(s) orally once a day

## 2021-10-21 NOTE — DISCHARGE NOTE PROVIDER - PROVIDER TOKENS
FREE:[LAST:[Termany],FIRST:[Edy],PHONE:[(   )    -],FAX:[(   )    -],FOLLOWUP:[1 week]],PROVIDER:[TOKEN:[[id number]],FOLLOWUP:[1 week]]

## 2021-10-21 NOTE — PROGRESS NOTE ADULT - ASSESSMENT
Hyponatremia: Likely pseudohyponatremia for IV IG infusion, ? Increased ADH state  Diabetes  Myasthenia Gravis    Improved sodium levels. To continue current meds. Monitor blood sugar levels. Insulin coverage as needed. Dietary restriction.   Neurology follow up. Will follow electrolytes and renal function trend. 
77 yo M with PMH BPH, GERD. DM, COPD, CAD, seizures, brain hemagioma s/p laser treatment in 2008, and myasthenia gravis presents to the ED with left eye weakness, drooping and blurry vision, admitted for myasthenia gravis exacerbation.
75 yo M with PMH BPH, GERD. DM, COPD, CAD, seizures, brain hemagioma s/p laser treatment in 2008, and myasthenia gravis presents to the ED with left eye weakness, drooping and blurry vision, admitted for myasthenia gravis exacerbation.

## 2021-10-21 NOTE — DISCHARGE NOTE PROVIDER - CARE PROVIDER_API CALL
Edy Royal  Phone: (   )    -  Fax: (   )    -  Follow Up Time: 1 week    Gena Bonilla  NEUROLOGY  [address] [place] [address]  Phone: [phone]  Fax: [phone]  Follow Up Time: 1 week

## 2021-10-21 NOTE — PROGRESS NOTE ADULT - REASON FOR ADMISSION
myasthenia gravis exacerbation

## 2021-10-21 NOTE — PROGRESS NOTE ADULT - SUBJECTIVE AND OBJECTIVE BOX
Neurology Follow up note    JACKIE LÓPEZ76yMale    HPI:  75 yo M with PMH BPH, GERD. DM, COPD, CAD, seizures, and myasthenia gravis presents to the ED with left eye weakness, drooping and blurry vision that has been ongoing for weeks and has occurred in the past. The patient states he went to his neurologist Dr. Bonilla today and advised to come to the ED for further evaluation. The patient denies drainage or trauma to eyes. Denies headache, dizziness, numbness, tingling, chest pain, SOB, palpitations. He denies drooling. He is compliant with his home mestinon. He takes daily prednisone for COPD.      ED Course:   Vitals: /84, HR 71, Temp 98.8 F, RR 16, SpO2 95% on RA  Labs: plt: 143, Na 133, gluc 118  CXR no infiltrates visualized [official results pending]      (18 Oct 2021 15:11)      Interval History -no new events    Patient is seen, chart was reviewed and case was discussed with the treatment team.  Pt is not in any distress.   Lying on bed comfortably.       `Vital Signs Last 24 Hrs  T(C): 36.7 (21 Oct 2021 12:32), Max: 36.7 (20 Oct 2021 20:13)  T(F): 98 (21 Oct 2021 12:32), Max: 98.1 (20 Oct 2021 22:23)  HR: 57 (21 Oct 2021 12:32) (57 - 69)  BP: 130/74 (21 Oct 2021 12:32) (124/72 - 167/82)  BP(mean): --  RR: 18 (21 Oct 2021 12:32) (18 - 18)  SpO2: 95% (21 Oct 2021 12:32) (93% - 95%)        REVIEW OF SYSTEMS:    Constitutional: No fever, weight loss or fatigue  Eyes: No eye pain, visual disturbances, or discharge  ENT:  No difficulty hearing, tinnitus, vertigo; No sinus or throat pain  Neck: No pain or stiffness  Respiratory: No  wheezing, chills or hemoptysis  Cardiovascular: No chest pain, palpitations, shortness of breath, dizziness  Gastrointestinal: No abdominal or epigastric pain. No nausea, vomiting or hematemesis; No diarrhea or constipation. No melena or hematochezia.  Genitourinary: No dysuria, frequency, hematuria or incontinence  Neurological: No headaches, memory loss, loss of strength, numbness or tremors  Psychiatric: No depression, anxiety, mood swings or difficulty sleeping  Musculoskeletal: No joint pain or swelling; No muscle, back or extremity pain  Skin: No itching, burning, rashes or lesions   Lymph Nodes: No enlarged glands  Endocrine: No heat or cold intolerance; No hair loss,   Allergy and Immunologic: No hives or eczema    On Neurological Examination:    Mental Status - Pt is alert, awake, oriented X3. Follows commands well and able to answer questions appropriately.Mood and affect  normal    Speech -  Normal.     Cranial Nerves - Pupils 3 mm equal and reactive to light,   partial  ptosis of left eye   Pt has no visual field deficit.  Pt has no  facial asymmetry. Facial sensation is intact  .Tongue - is in midline.    Muscle tone - is normal    Motor Exam - mild proximal weakness   No drift. No shaking or tremors.    Sensory Exam - Pin prick, temperature, joint position and vibration are intact on either side. Pt withdraws all extremities equally on stimulation. No asymmetry seen. No complaints of tingling, numbness.    Gait - Able to stand and walk unassisted.        coordination:    Finger to nose: normal      Deep tendon Reflexes - 2 plus all over.          Neck Supple -  Yes.     MEDICATIONS    ALBUTerol    90 MICROgram(s) HFA Inhaler 1 Puff(s) Inhalation daily PRN  atorvastatin 40 milliGRAM(s) Oral at bedtime  budesonide 160 MICROgram(s)/formoterol 4.5 MICROgram(s) Inhaler 2 Puff(s) Inhalation two times a day PRN  dextrose 40% Gel 15 Gram(s) Oral once  dextrose 5%. 1000 milliLiter(s) IV Continuous <Continuous>  dextrose 5%. 1000 milliLiter(s) IV Continuous <Continuous>  dextrose 50% Injectable 25 Gram(s) IV Push once  dextrose 50% Injectable 12.5 Gram(s) IV Push once  dextrose 50% Injectable 25 Gram(s) IV Push once  enoxaparin Injectable 40 milliGRAM(s) SubCutaneous daily  glucagon  Injectable 1 milliGRAM(s) IntraMuscular once  immune   globulin 10% (GAMMAGARD) IVPB 30 Gram(s) IV Intermittent daily  insulin lispro (ADMELOG) corrective regimen sliding scale   SubCutaneous three times a day before meals  insulin lispro (ADMELOG) corrective regimen sliding scale   SubCutaneous at bedtime  lisinopril 2.5 milliGRAM(s) Oral daily  OXcarbazepine 300 milliGRAM(s) Oral <User Schedule>  predniSONE   Tablet 2.5 milliGRAM(s) Oral daily  pyridostigmine 60 milliGRAM(s) Oral every 6 hours      Allergies    No Known Allergies    Intolerances                  Hemoglobin A1C:     Vitamin B12     RADIOLOGY    ASSESSMENT AND PLAN:      exacerbation of MG sp ivig  recurrence of ptosis related end of dose phenomena(pyridostigmine)  seizure disorder  HA ?related to IVIG    dc home  on mestinon   management dw with medical team  no need for high dose steroid; can cause worsening of weakness during first 7-10 days  Physical therapy evaluation.  OOB to chair/ambulation with assistance only.  Pain is accessed and addressed.  Would continue to follow.          
75 yo M w/ PMH myasthenia gravis, seizures, BPH, GERD, COPD, CAD presented to ED w/ 3wk hx of left eye weakness, dropping and blurry vision admitted for myasthenia gravis exacerbation.     INTERVAL HPI/OVERNIGHT EVENTS: Overnight a rapid was called at 23:22 after rigors and labored breathing developed during IVIG infusion. Was given benadryl 50mg, solumedrol 125mg IV, and duoneb. Pt was stabilized and sx resolved.  During morning rounds, patient seen and examined at bedside. Left eye weakness and drooping have resolved.  Blurry vision persists. Denies fevers, chills, headache, lightheadedness, chest pain, dyspnea, abdominal pain, n/v/d/c.    MEDICATIONS  (STANDING):  atorvastatin 40 milliGRAM(s) Oral at bedtime  dextrose 40% Gel 15 Gram(s) Oral once  dextrose 5%. 1000 milliLiter(s) (50 mL/Hr) IV Continuous <Continuous>  dextrose 5%. 1000 milliLiter(s) (100 mL/Hr) IV Continuous <Continuous>  dextrose 50% Injectable 25 Gram(s) IV Push once  dextrose 50% Injectable 12.5 Gram(s) IV Push once  dextrose 50% Injectable 25 Gram(s) IV Push once  enoxaparin Injectable 40 milliGRAM(s) SubCutaneous daily  glucagon  Injectable 1 milliGRAM(s) IntraMuscular once  immune   globulin 10% (GAMMAGARD) IVPB 30 Gram(s) IV Intermittent daily  insulin lispro (ADMELOG) corrective regimen sliding scale   SubCutaneous three times a day before meals  insulin lispro (ADMELOG) corrective regimen sliding scale   SubCutaneous at bedtime  lisinopril 2.5 milliGRAM(s) Oral daily  OXcarbazepine 300 milliGRAM(s) Oral <User Schedule>  predniSONE   Tablet 2.5 milliGRAM(s) Oral daily  pyridostigmine 60 milliGRAM(s) Oral every 6 hours    MEDICATIONS  (PRN):  ALBUTerol    90 MICROgram(s) HFA Inhaler 1 Puff(s) Inhalation daily PRN COPD  budesonide 160 MICROgram(s)/formoterol 4.5 MICROgram(s) Inhaler 2 Puff(s) Inhalation two times a day PRN COPD      Allergies: No Known Allergies    REVIEW OF SYSTEMS:  CONSTITUTIONAL: No fever or chills  HEENT:  No headache, no sore throat  RESPIRATORY: No cough, wheezing, or shortness of breath  CARDIOVASCULAR: No chest pain, palpitations  GASTROINTESTINAL: No abd pain, nausea, vomiting, or diarrhea  GENITOURINARY: No dysuria, frequency, or hematuria  NEUROLOGICAL: no focal weakness or dizziness  MUSCULOSKELETAL: no myalgias, intermittent knee pain    Vital Signs Last 24 Hrs  T(C): 36.8 (19 Oct 2021 04:35), Max: 37.4 (19 Oct 2021 01:41)  T(F): 98.2 (19 Oct 2021 04:35), Max: 99.3 (19 Oct 2021 01:41)  HR: 53 (19 Oct 2021 09:30) (53 - 109)  BP: 125/72 (19 Oct 2021 04:35) (116/66 - 152/84)  RR: 17 (19 Oct 2021 04:35) (16 - 18)  SpO2: 94% (19 Oct 2021 09:30) (91% - 98%)    PHYSICAL EXAM:  GENERAL: NAD, well appearing  HEENT:  anicteric, moist mucous membranes  CHEST/LUNG:  CTA b/l, no rales, wheezes, or rhonchi  HEART:  RRR, S1, S2, no m/r/g  ABDOMEN:  BS+, soft, nontender, nondistended  EXTREMITIES: strength and sensation intact, no edema, cyanosis, or calf tenderness  NERVOUS SYSTEM: answers questions and follows commands appropriately    LABS:                        14.5   7.67  )-----------( 165      ( 19 Oct 2021 08:50 )             42.7     CBC Full  -  ( 19 Oct 2021 08:50 )  WBC Count : 7.67 K/uL  Hemoglobin : 14.5 g/dL  Hematocrit : 42.7 %  Platelet Count - Automated : 165 K/uL  Mean Cell Volume : 96.0 fl  Mean Cell Hemoglobin : 32.6 pg  Mean Cell Hemoglobin Concentration : 34.0 gm/dL  Auto Neutrophil # : x  Auto Lymphocyte # : x  Auto Monocyte # : x  Auto Eosinophil # : x  Auto Basophil # : x  Auto Neutrophil % : x  Auto Lymphocyte % : x  Auto Monocyte % : x  Auto Eosinophil % : x  Auto Basophil % : x    19 Oct 2021 08:50    131    |  98     |  14     ----------------------------<  176    4.5     |  25     |  0.86     Ca    8.9        19 Oct 2021 08:50    TPro  8.2    /  Alb  3.4    /  TBili  0.7    /  DBili  x      /  AST  20     /  ALT  28     /  AlkPhos  78     19 Oct 2021 08:50        CAPILLARY BLOOD GLUCOSE      POCT Blood Glucose.: 191 mg/dL (19 Oct 2021 08:15)  POCT Blood Glucose.: 93 mg/dL (18 Oct 2021 23:26)  POCT Blood Glucose.: 107 mg/dL (18 Oct 2021 22:22)          RADIOLOGY & ADDITIONAL TESTS:    Personally reviewed.     Consultant(s) Notes Reviewed:  [x] YES  [ ] NO    
Patient is a 76y old  Male who presents with a chief complaint of myasthenia gravis exacerbation (21 Oct 2021 11:07)    Patient seen in follow up for hyponatremia.     PAST MEDICAL HISTORY:  CAD (coronary artery disease)    Diabetes mellitus    COPD without exacerbation    Benign prostatic hyperplasia    GERD (gastroesophageal reflux disease)    Myasthenia gravis    Seizures      MEDICATIONS  (STANDING):  atorvastatin 40 milliGRAM(s) Oral at bedtime  dextrose 40% Gel 15 Gram(s) Oral once  dextrose 5%. 1000 milliLiter(s) (50 mL/Hr) IV Continuous <Continuous>  dextrose 5%. 1000 milliLiter(s) (100 mL/Hr) IV Continuous <Continuous>  dextrose 50% Injectable 25 Gram(s) IV Push once  dextrose 50% Injectable 12.5 Gram(s) IV Push once  dextrose 50% Injectable 25 Gram(s) IV Push once  enoxaparin Injectable 40 milliGRAM(s) SubCutaneous daily  glucagon  Injectable 1 milliGRAM(s) IntraMuscular once  insulin lispro (ADMELOG) corrective regimen sliding scale   SubCutaneous three times a day before meals  insulin lispro (ADMELOG) corrective regimen sliding scale   SubCutaneous at bedtime  lisinopril 2.5 milliGRAM(s) Oral daily  OXcarbazepine 300 milliGRAM(s) Oral <User Schedule>  predniSONE   Tablet 2.5 milliGRAM(s) Oral daily  pyridostigmine 60 milliGRAM(s) Oral every 6 hours  sodium chloride 0.9%. 1000 milliLiter(s) (50 mL/Hr) IV Continuous <Continuous>    MEDICATIONS  (PRN):  acetaminophen   Tablet .. 650 milliGRAM(s) Oral every 6 hours PRN Mild Pain (1 - 3)  ALBUTerol    90 MICROgram(s) HFA Inhaler 1 Puff(s) Inhalation daily PRN COPD  budesonide 160 MICROgram(s)/formoterol 4.5 MICROgram(s) Inhaler 2 Puff(s) Inhalation two times a day PRN COPD    T(C): 36.7 (10-21-21 @ 12:32), Max: 36.7 (10-20-21 @ 20:13)  HR: 57 (10-21-21 @ 12:32) (54 - 69)  BP: 130/74 (10-21-21 @ 12:32) (124/72 - 167/82)  RR: 18 (10-21-21 @ 12:32) (18 - 18)  SpO2: 95% (10-21-21 @ 12:32) (93% - 96%)  Wt(kg): --  I&O's Detail    20 Oct 2021 07:01  -  21 Oct 2021 07:00  --------------------------------------------------------  IN:    IV PiggyBack: 40 mL  Total IN: 40 mL    OUT:  Total OUT: 0 mL    Total NET: 40 mL          PHYSICAL EXAM:  General: No distress  Respiratory: b/l air entry  Cardiovascular: S1 S2  Gastrointestinal: soft  Extremities: no edema                              12.5   6.03  )-----------( 129      ( 21 Oct 2021 06:51 )             36.6     10-21    132<L>  |  99  |  20  ----------------------------<  96  3.7   |  29  |  0.75    Ca    8.5      21 Oct 2021 06:51    TPro  7.6  /  Alb  2.8<L>  /  TBili  0.4  /  DBili  x   /  AST  17  /  ALT  24  /  AlkPhos  64  10-21        LIVER FUNCTIONS - ( 21 Oct 2021 06:51 )  Alb: 2.8 g/dL / Pro: 7.6 g/dL / ALK PHOS: 64 U/L / ALT: 24 U/L / AST: 17 U/L / GGT: x               Sodium, Serum: 132 (10-21 @ 06:51)  Sodium, Serum: 128 (10-20 @ 08:50)  Sodium, Serum: 131 (10-19 @ 08:50)  Sodium, Serum: 133 (10-18 @ 14:17)    Creatinine, Serum: 0.75 (10-21 @ 06:51)  Creatinine, Serum: 0.78 (10-20 @ 08:50)  Creatinine, Serum: 0.86 (10-19 @ 08:50)  Creatinine, Serum: 0.70 (10-18 @ 14:17)    Potassium, Serum: 3.7 (10-21 @ 06:51)  Potassium, Serum: 3.6 (10-20 @ 08:50)  Potassium, Serum: 4.5 (10-19 @ 08:50)  Potassium, Serum: 4.7 (10-18 @ 14:17)    Hemoglobin: 12.5 (10-21 @ 06:51)  Hemoglobin: 13.8 (10-20 @ 08:50)  Hemoglobin: 14.5 (10-19 @ 08:50)  Hemoglobin: 13.6 (10-18 @ 14:17)    
neuro cons dict  seen for ptosis/sob consistent with-  Exacerbation of  myasthenia gravis  IVIG 0.4 gm/KG/wt daily for 3-5 days  continue mestinon 60 mg qid  CBC/CHEMISRTY  NIF/VC
Patient is a 76y old  Male who presents with a chief complaint of myasthenia gravis exacerbation (19 Oct 2021 17:23)      INTERVAL HPI/OVERNIGHT EVENTS: Patient seen and examined at bedside. No overnight events occurred. Patient reports headache and dizziness, likely attributed to IVIG infusions. Pt also reports he noticed his left eye had more of an eye droop than on previous days. Dr. Bonilla. Denies fevers, chills, lightheadedness, chest pain, dyspnea, abdominal pain, n/v/d/c.    MEDICATIONS  (STANDING):  atorvastatin 40 milliGRAM(s) Oral at bedtime  dextrose 40% Gel 15 Gram(s) Oral once  dextrose 5%. 1000 milliLiter(s) (50 mL/Hr) IV Continuous <Continuous>  dextrose 5%. 1000 milliLiter(s) (100 mL/Hr) IV Continuous <Continuous>  dextrose 50% Injectable 25 Gram(s) IV Push once  dextrose 50% Injectable 12.5 Gram(s) IV Push once  dextrose 50% Injectable 25 Gram(s) IV Push once  enoxaparin Injectable 40 milliGRAM(s) SubCutaneous daily  glucagon  Injectable 1 milliGRAM(s) IntraMuscular once  immune   globulin 10% (GAMMAGARD) IVPB 30 Gram(s) IV Intermittent daily  insulin lispro (ADMELOG) corrective regimen sliding scale   SubCutaneous three times a day before meals  insulin lispro (ADMELOG) corrective regimen sliding scale   SubCutaneous at bedtime  lisinopril 2.5 milliGRAM(s) Oral daily  OXcarbazepine 300 milliGRAM(s) Oral <User Schedule>  predniSONE   Tablet 2.5 milliGRAM(s) Oral daily  pyridostigmine 60 milliGRAM(s) Oral every 6 hours    MEDICATIONS  (PRN):  acetaminophen   Tablet .. 650 milliGRAM(s) Oral every 6 hours PRN Mild Pain (1 - 3)  ALBUTerol    90 MICROgram(s) HFA Inhaler 1 Puff(s) Inhalation daily PRN COPD  budesonide 160 MICROgram(s)/formoterol 4.5 MICROgram(s) Inhaler 2 Puff(s) Inhalation two times a day PRN COPD      Allergies    No Known Allergies    Intolerances        REVIEW OF SYSTEMS:  CONSTITUTIONAL: No fever or chills  HEENT:  +Headache. no sore throat. + Left eye droop  RESPIRATORY: No cough, wheezing, or shortness of breath  CARDIOVASCULAR: No chest pain, palpitations  GASTROINTESTINAL: No abd pain, nausea, vomiting, or diarrhea  GENITOURINARY: No dysuria, frequency, or hematuria  NEUROLOGICAL: no focal weakness. +Dizziness  MUSCULOSKELETAL: no myalgias     Vital Signs Last 24 Hrs  T(C): 36.4 (20 Oct 2021 04:42), Max: 36.7 (19 Oct 2021 21:04)  T(F): 97.5 (20 Oct 2021 04:42), Max: 98 (19 Oct 2021 21:04)  HR: 58 (20 Oct 2021 04:42) (58 - 72)  BP: 131/77 (20 Oct 2021 04:42) (112/66 - 131/85)  BP(mean): --  RR: 18 (20 Oct 2021 04:42) (18 - 18)  SpO2: 94% (20 Oct 2021 04:42) (93% - 94%)    PHYSICAL EXAM:  GENERAL: NAD, lying in hospital bed. Appears stated age, normal body habitus.  HEENT:  anicteric, moist mucous membranes. Left eye ptosis.  CHEST/LUNG:  CTA b/l, no rales, wheezes, or rhonchi  HEART:  RRR, S1, S2  ABDOMEN:  BS+, soft, nontender, nondistended  EXTREMITIES: no edema, cyanosis, or calf tenderness  NERVOUS SYSTEM: AAOx3. Answers questions and follows commands appropriately. Muscle strength 5/5 b/l.    LABS:                        13.8   10.16 )-----------( 161      ( 20 Oct 2021 08:50 )             40.6     CBC Full  -  ( 20 Oct 2021 08:50 )  WBC Count : 10.16 K/uL  Hemoglobin : 13.8 g/dL  Hematocrit : 40.6 %  Platelet Count - Automated : 161 K/uL  Mean Cell Volume : 96.7 fl  Mean Cell Hemoglobin : 32.9 pg  Mean Cell Hemoglobin Concentration : 34.0 gm/dL  Auto Neutrophil # : 7.67 K/uL  Auto Lymphocyte # : 1.50 K/uL  Auto Monocyte # : 0.81 K/uL  Auto Eosinophil # : 0.07 K/uL  Auto Basophil # : 0.04 K/uL  Auto Neutrophil % : 75.4 %  Auto Lymphocyte % : 14.8 %  Auto Monocyte % : 8.0 %  Auto Eosinophil % : 0.7 %  Auto Basophil % : 0.4 %    20 Oct 2021 08:50    128    |  94     |  17     ----------------------------<  116    3.6     |  28     |  0.78     Ca    8.6        20 Oct 2021 08:50    TPro  8.0    /  Alb  3.2    /  TBili  0.5    /  DBili  x      /  AST  18     /  ALT  26     /  AlkPhos  69     20 Oct 2021 08:50        CAPILLARY BLOOD GLUCOSE      POCT Blood Glucose.: 115 mg/dL (20 Oct 2021 07:56)  POCT Blood Glucose.: 114 mg/dL (19 Oct 2021 21:27)  POCT Blood Glucose.: 122 mg/dL (19 Oct 2021 17:01)  POCT Blood Glucose.: 162 mg/dL (19 Oct 2021 12:00)          RADIOLOGY & ADDITIONAL TESTS:    Personally reviewed.     Consultant(s) Notes Reviewed:  [x] YES  [ ] NO    
Neurology Follow up note    JACKIE LÓPEZ76yMale    HPI:  75 yo M with PMH BPH, GERD. DM, COPD, CAD, seizures, and myasthenia gravis presents to the ED with left eye weakness, drooping and blurry vision that has been ongoing for weeks and has occurred in the past. The patient states he went to his neurologist Dr. Bonilla today and advised to come to the ED for further evaluation. The patient denies drainage or trauma to eyes. Denies headache, dizziness, numbness, tingling, chest pain, SOB, palpitations. He denies drooling. He is compliant with his home mestinon. He takes daily prednisone for COPD.      ED Course:   Vitals: /84, HR 71, Temp 98.8 F, RR 16, SpO2 95% on RA  Labs: plt: 143, Na 133, gluc 118  CXR no infiltrates visualized [official results pending]      (18 Oct 2021 15:11)      Interval History -recurrence of ptosis    Patient is seen, chart was reviewed and case was discussed with the treatment team.  Pt is not in any distress.   Lying on bed comfortably.       Vital Signs Last 24 Hrs  T(C): 36.6 (20 Oct 2021 11:55), Max: 36.7 (19 Oct 2021 21:04)  T(F): 97.8 (20 Oct 2021 11:55), Max: 98 (19 Oct 2021 21:04)  HR: 59 (20 Oct 2021 12:13) (54 - 72)  BP: 133/74 (20 Oct 2021 11:55) (131/77 - 133/74)  BP(mean): --  RR: 18 (20 Oct 2021 11:55) (18 - 18)  SpO2: 94% (20 Oct 2021 12:13) (93% - 96%)        REVIEW OF SYSTEMS:    Constitutional: No fever, weight loss or fatigue  Eyes: No eye pain, visual disturbances, or discharge  ENT:  No difficulty hearing, tinnitus, vertigo; No sinus or throat pain  Neck: No pain or stiffness  Respiratory: No  wheezing, chills or hemoptysis  Cardiovascular: No chest pain, palpitations, shortness of breath, dizziness  Gastrointestinal: No abdominal or epigastric pain. No nausea, vomiting or hematemesis; No diarrhea or constipation. No melena or hematochezia.  Genitourinary: No dysuria, frequency, hematuria or incontinence  Neurological: No headaches, memory loss, loss of strength, numbness or tremors  Psychiatric: No depression, anxiety, mood swings or difficulty sleeping  Musculoskeletal: No joint pain or swelling; No muscle, back or extremity pain  Skin: No itching, burning, rashes or lesions   Lymph Nodes: No enlarged glands  Endocrine: No heat or cold intolerance; No hair loss,   Allergy and Immunologic: No hives or eczema    On Neurological Examination:    Mental Status - Pt is alert, awake, oriented X3. Follows commands well and able to answer questions appropriately.Mood and affect  normal    Speech -  Normal.     Cranial Nerves - Pupils 3 mm equal and reactive to light,   partial  ptosis of left eye   Pt has no visual field deficit.  Pt has no  facial asymmetry. Facial sensation is intact  .Tongue - is in midline.    Muscle tone - is normal    Motor Exam - mild proximal weakness   No drift. No shaking or tremors.    Sensory Exam - Pin prick, temperature, joint position and vibration are intact on either side. Pt withdraws all extremities equally on stimulation. No asymmetry seen. No complaints of tingling, numbness.    Gait - Able to stand and walk unassisted.        coordination:    Finger to nose: normal      Deep tendon Reflexes - 2 plus all over.          Neck Supple -  Yes.     MEDICATIONS    ALBUTerol    90 MICROgram(s) HFA Inhaler 1 Puff(s) Inhalation daily PRN  atorvastatin 40 milliGRAM(s) Oral at bedtime  budesonide 160 MICROgram(s)/formoterol 4.5 MICROgram(s) Inhaler 2 Puff(s) Inhalation two times a day PRN  dextrose 40% Gel 15 Gram(s) Oral once  dextrose 5%. 1000 milliLiter(s) IV Continuous <Continuous>  dextrose 5%. 1000 milliLiter(s) IV Continuous <Continuous>  dextrose 50% Injectable 25 Gram(s) IV Push once  dextrose 50% Injectable 12.5 Gram(s) IV Push once  dextrose 50% Injectable 25 Gram(s) IV Push once  enoxaparin Injectable 40 milliGRAM(s) SubCutaneous daily  glucagon  Injectable 1 milliGRAM(s) IntraMuscular once  immune   globulin 10% (GAMMAGARD) IVPB 30 Gram(s) IV Intermittent daily  insulin lispro (ADMELOG) corrective regimen sliding scale   SubCutaneous three times a day before meals  insulin lispro (ADMELOG) corrective regimen sliding scale   SubCutaneous at bedtime  lisinopril 2.5 milliGRAM(s) Oral daily  OXcarbazepine 300 milliGRAM(s) Oral <User Schedule>  predniSONE   Tablet 2.5 milliGRAM(s) Oral daily  pyridostigmine 60 milliGRAM(s) Oral every 6 hours      Allergies    No Known Allergies    Intolerances            10-20    128<L>  |  94<L>  |  17  ----------------------------<  116<H>  3.6   |  28  |  0.78    Ca    8.6      20 Oct 2021 08:50    TPro  8.0  /  Alb  3.2<L>  /  TBili  0.5  /  DBili  x   /  AST  18  /  ALT  26  /  AlkPhos  69  10-20      Hemoglobin A1C:     Vitamin B12     RADIOLOGY    ASSESSMENT AND PLAN:      exacerbation of MG  recurrence of ptosis related end of dose phenomena(pyridostigmine)  seizure disorder  HA ?related to IVIG    continue ivig for total 3 day  on mestinon   discharge planning  management dw with medical team  no need for high dose steroid; can cause worsening of weakness during first 7-10 days  Physical therapy evaluation.  OOB to chair/ambulation with assistance only.  Pain is accessed and addressed.  Would continue to follow.    
Neurology Follow up note    JACKIE SINGHyMale    HPI:  77 yo M with PMH BPH, GERD. DM, COPD, CAD, seizures, and myasthenia gravis presents to the ED with left eye weakness, drooping and blurry vision that has been ongoing for weeks and has occurred in the past. The patient states he went to his neurologist Dr. Bonilla today and advised to come to the ED for further evaluation. The patient denies drainage or trauma to eyes. Denies headache, dizziness, numbness, tingling, chest pain, SOB, palpitations. He denies drooling. He is compliant with his home mestinon. He takes daily prednisone for COPD.      ED Course:   Vitals: /84, HR 71, Temp 98.8 F, RR 16, SpO2 95% on RA  Labs: plt: 143, Na 133, gluc 118  CXR no infiltrates visualized [official results pending]      (18 Oct 2021 15:11)      Interval History -ptosis bettr    Patient is seen, chart was reviewed and case was discussed with the treatment team.  Pt is not in any distress.   Lying on bed comfortably.       Vital Signs Last 24 Hrs  T(C): 36.3 (19 Oct 2021 12:25), Max: 37.4 (19 Oct 2021 01:41)  T(F): 97.3 (19 Oct 2021 12:25), Max: 99.3 (19 Oct 2021 01:41)  HR: 63 (19 Oct 2021 12:25) (53 - 109)  BP: 112/66 (19 Oct 2021 12:25) (112/66 - 143/79)  BP(mean): --  RR: 18 (19 Oct 2021 12:25) (16 - 18)  SpO2: 93% (19 Oct 2021 12:25) (91% - 98%)        REVIEW OF SYSTEMS:    Constitutional: No fever, weight loss or fatigue  Eyes: No eye pain, visual disturbances, or discharge  ENT:  No difficulty hearing, tinnitus, vertigo; No sinus or throat pain  Neck: No pain or stiffness  Respiratory: No  wheezing, chills or hemoptysis  Cardiovascular: No chest pain, palpitations, shortness of breath, dizziness  Gastrointestinal: No abdominal or epigastric pain. No nausea, vomiting or hematemesis; No diarrhea or constipation. No melena or hematochezia.  Genitourinary: No dysuria, frequency, hematuria or incontinence  Neurological: No headaches, memory loss, loss of strength, numbness or tremors  Psychiatric: No depression, anxiety, mood swings or difficulty sleeping  Musculoskeletal: No joint pain or swelling; No muscle, back or extremity pain  Skin: No itching, burning, rashes or lesions   Lymph Nodes: No enlarged glands  Endocrine: No heat or cold intolerance; No hair loss,   Allergy and Immunologic: No hives or eczema    On Neurological Examination:    Mental Status - Pt is alert, awake, oriented X3. Follows commands well and able to answer questions appropriately.Mood and affect  normal    Speech -  Normal.     Cranial Nerves - Pupils 3 mm equal and reactive to light,   left eye closure 4/5   Pt has no visual field deficit.  Pt has no  facial asymmetry. Facial sensation is intact  .Tongue - is in midline.    Muscle tone - is normal    Motor Exam - mild proximal weakness   No drift. No shaking or tremors.    Sensory Exam - Pin prick, temperature, joint position and vibration are intact on either side. Pt withdraws all extremities equally on stimulation. No asymmetry seen. No complaints of tingling, numbness.    Gait - Able to stand and walk unassisted.        coordination:    Finger to nose: normal/      Deep tendon Reflexes - 2 plus all over.          Neck Supple -  Yes.     MEDICATIONS    ALBUTerol    90 MICROgram(s) HFA Inhaler 1 Puff(s) Inhalation daily PRN  atorvastatin 40 milliGRAM(s) Oral at bedtime  budesonide 160 MICROgram(s)/formoterol 4.5 MICROgram(s) Inhaler 2 Puff(s) Inhalation two times a day PRN  dextrose 40% Gel 15 Gram(s) Oral once  dextrose 5%. 1000 milliLiter(s) IV Continuous <Continuous>  dextrose 5%. 1000 milliLiter(s) IV Continuous <Continuous>  dextrose 50% Injectable 25 Gram(s) IV Push once  dextrose 50% Injectable 12.5 Gram(s) IV Push once  dextrose 50% Injectable 25 Gram(s) IV Push once  enoxaparin Injectable 40 milliGRAM(s) SubCutaneous daily  glucagon  Injectable 1 milliGRAM(s) IntraMuscular once  immune   globulin 10% (GAMMAGARD) IVPB 30 Gram(s) IV Intermittent daily  insulin lispro (ADMELOG) corrective regimen sliding scale   SubCutaneous three times a day before meals  insulin lispro (ADMELOG) corrective regimen sliding scale   SubCutaneous at bedtime  lisinopril 2.5 milliGRAM(s) Oral daily  OXcarbazepine 300 milliGRAM(s) Oral <User Schedule>  predniSONE   Tablet 2.5 milliGRAM(s) Oral daily  pyridostigmine 60 milliGRAM(s) Oral every 6 hours      Allergies    No Known Allergies    Intolerances        LABS:  CBC Full  -  ( 19 Oct 2021 08:50 )  WBC Count : 7.67 K/uL  RBC Count : 4.45 M/uL  Hemoglobin : 14.5 g/dL  Hematocrit : 42.7 %  Platelet Count - Automated : 165 K/uL  Mean Cell Volume : 96.0 fl  Mean Cell Hemoglobin : 32.6 pg  \ % : x  Auto Monocyte % : x  Auto Eosinophil % : x  Auto Basophil % : x      10-19    131<L>  |  98  |  14  ----------------------------<  176<H>  4.5   |  25  |  0.86    Ca    8.9      19 Oct 2021 08:50    TPro  8.2  /  Alb  3.4  /  TBili  0.7  /  DBili  x   /  AST  20  /  ALT  28  /  AlkPhos  78  10-19    Hemoglobin A1C:     Vitamin B12     RADIOLOGY    ASSESSMENT AND PLAN:      exacerbation of MG  seizure disorder    continue ivig for total 3 day  on mestinon   no need for high dose steroid; can cause worsening of weakness during first 7-10 days  Physical therapy evaluation.  OOB to chair/ambulation with assistance only.  Pain is accessed and addressed.  Would continue to follow.

## 2021-10-21 NOTE — DISCHARGE NOTE NURSING/CASE MANAGEMENT/SOCIAL WORK - PATIENT PORTAL LINK FT
You can access the FollowMyHealth Patient Portal offered by [facility] by registering at the following website: [email or web address]. By joining [facility]’s FollowMyHealth portal, you will also be able to view your health information using other applications (apps) compatible with our system.

## 2021-11-04 ENCOUNTER — INPATIENT (INPATIENT)
Facility: HOSPITAL | Age: 76
LOS: 4 days | Discharge: HOME | DRG: 872 | End: 2021-11-09
Attending: FAMILY MEDICINE | Admitting: INTERNAL MEDICINE
Payer: COMMERCIAL

## 2021-11-04 VITALS
SYSTOLIC BLOOD PRESSURE: 117 MMHG | RESPIRATION RATE: 18 BRPM | HEART RATE: 89 BPM | HEIGHT: 64 IN | TEMPERATURE: 99 F | OXYGEN SATURATION: 88 % | WEIGHT: 190.04 LBS | DIASTOLIC BLOOD PRESSURE: 73 MMHG

## 2021-11-04 DIAGNOSIS — K21.9 GASTRO-ESOPHAGEAL REFLUX DISEASE WITHOUT ESOPHAGITIS: ICD-10-CM

## 2021-11-04 DIAGNOSIS — J96.01 ACUTE RESPIRATORY FAILURE WITH HYPOXIA: ICD-10-CM

## 2021-11-04 DIAGNOSIS — E78.5 HYPERLIPIDEMIA, UNSPECIFIED: ICD-10-CM

## 2021-11-04 DIAGNOSIS — J44.9 CHRONIC OBSTRUCTIVE PULMONARY DISEASE, UNSPECIFIED: ICD-10-CM

## 2021-11-04 DIAGNOSIS — E11.9 TYPE 2 DIABETES MELLITUS WITHOUT COMPLICATIONS: ICD-10-CM

## 2021-11-04 DIAGNOSIS — R50.9 FEVER, UNSPECIFIED: ICD-10-CM

## 2021-11-04 DIAGNOSIS — R56.9 UNSPECIFIED CONVULSIONS: ICD-10-CM

## 2021-11-04 DIAGNOSIS — Z29.9 ENCOUNTER FOR PROPHYLACTIC MEASURES, UNSPECIFIED: ICD-10-CM

## 2021-11-04 DIAGNOSIS — I25.10 ATHEROSCLEROTIC HEART DISEASE OF NATIVE CORONARY ARTERY WITHOUT ANGINA PECTORIS: ICD-10-CM

## 2021-11-04 DIAGNOSIS — I10 ESSENTIAL (PRIMARY) HYPERTENSION: ICD-10-CM

## 2021-11-04 DIAGNOSIS — N40.0 BENIGN PROSTATIC HYPERPLASIA WITHOUT LOWER URINARY TRACT SYMPTOMS: ICD-10-CM

## 2021-11-04 DIAGNOSIS — R19.7 DIARRHEA, UNSPECIFIED: ICD-10-CM

## 2021-11-04 DIAGNOSIS — G70.00 MYASTHENIA GRAVIS WITHOUT (ACUTE) EXACERBATION: ICD-10-CM

## 2021-11-04 DIAGNOSIS — E87.1 HYPO-OSMOLALITY AND HYPONATREMIA: ICD-10-CM

## 2021-11-04 DIAGNOSIS — M79.10 MYALGIA, UNSPECIFIED SITE: ICD-10-CM

## 2021-11-04 LAB
ALBUMIN SERPL ELPH-MCNC: 2.6 G/DL — LOW (ref 3.3–5)
ALP SERPL-CCNC: 65 U/L — SIGNIFICANT CHANGE UP (ref 40–120)
ALT FLD-CCNC: 66 U/L — SIGNIFICANT CHANGE UP (ref 12–78)
ANION GAP SERPL CALC-SCNC: 6 MMOL/L — SIGNIFICANT CHANGE UP (ref 5–17)
APPEARANCE UR: CLEAR — SIGNIFICANT CHANGE UP
APTT BLD: 27.9 SEC — SIGNIFICANT CHANGE UP (ref 27.5–35.5)
AST SERPL-CCNC: 57 U/L — HIGH (ref 15–37)
BASOPHILS # BLD AUTO: 0.02 K/UL — SIGNIFICANT CHANGE UP (ref 0–0.2)
BASOPHILS NFR BLD AUTO: 0.2 % — SIGNIFICANT CHANGE UP (ref 0–2)
BILIRUB SERPL-MCNC: 0.8 MG/DL — SIGNIFICANT CHANGE UP (ref 0.2–1.2)
BILIRUB UR-MCNC: NEGATIVE — SIGNIFICANT CHANGE UP
BUN SERPL-MCNC: 26 MG/DL — HIGH (ref 7–23)
CALCIUM SERPL-MCNC: 8.7 MG/DL — SIGNIFICANT CHANGE UP (ref 8.5–10.1)
CHLORIDE SERPL-SCNC: 103 MMOL/L — SIGNIFICANT CHANGE UP (ref 96–108)
CK SERPL-CCNC: 48 U/L — SIGNIFICANT CHANGE UP (ref 26–308)
CO2 SERPL-SCNC: 28 MMOL/L — SIGNIFICANT CHANGE UP (ref 22–31)
COLOR SPEC: YELLOW — SIGNIFICANT CHANGE UP
CREAT SERPL-MCNC: 0.97 MG/DL — SIGNIFICANT CHANGE UP (ref 0.5–1.3)
DIFF PNL FLD: ABNORMAL
EOSINOPHIL # BLD AUTO: 0.03 K/UL — SIGNIFICANT CHANGE UP (ref 0–0.5)
EOSINOPHIL NFR BLD AUTO: 0.2 % — SIGNIFICANT CHANGE UP (ref 0–6)
GLUCOSE SERPL-MCNC: 143 MG/DL — HIGH (ref 70–99)
GLUCOSE UR QL: NEGATIVE — SIGNIFICANT CHANGE UP
HCT VFR BLD CALC: 38.4 % — LOW (ref 39–50)
HGB BLD-MCNC: 12.7 G/DL — LOW (ref 13–17)
IMM GRANULOCYTES NFR BLD AUTO: 0.5 % — SIGNIFICANT CHANGE UP (ref 0–1.5)
INR BLD: 1.13 RATIO — SIGNIFICANT CHANGE UP (ref 0.88–1.16)
KETONES UR-MCNC: NEGATIVE — SIGNIFICANT CHANGE UP
LACTATE SERPL-SCNC: 2.3 MMOL/L — HIGH (ref 0.7–2)
LEUKOCYTE ESTERASE UR-ACNC: ABNORMAL
LIDOCAIN IGE QN: 85 U/L — SIGNIFICANT CHANGE UP (ref 73–393)
LYMPHOCYTES # BLD AUTO: 1.26 K/UL — SIGNIFICANT CHANGE UP (ref 1–3.3)
LYMPHOCYTES # BLD AUTO: 9.8 % — LOW (ref 13–44)
MCHC RBC-ENTMCNC: 32.1 PG — SIGNIFICANT CHANGE UP (ref 27–34)
MCHC RBC-ENTMCNC: 33.1 GM/DL — SIGNIFICANT CHANGE UP (ref 32–36)
MCV RBC AUTO: 97 FL — SIGNIFICANT CHANGE UP (ref 80–100)
MONOCYTES # BLD AUTO: 1.12 K/UL — HIGH (ref 0–0.9)
MONOCYTES NFR BLD AUTO: 8.7 % — SIGNIFICANT CHANGE UP (ref 2–14)
NEUTROPHILS # BLD AUTO: 10.36 K/UL — HIGH (ref 1.8–7.4)
NEUTROPHILS NFR BLD AUTO: 80.6 % — HIGH (ref 43–77)
NITRITE UR-MCNC: NEGATIVE — SIGNIFICANT CHANGE UP
NRBC # BLD: 0 /100 WBCS — SIGNIFICANT CHANGE UP (ref 0–0)
NT-PROBNP SERPL-SCNC: 257 PG/ML — SIGNIFICANT CHANGE UP (ref 0–450)
PH UR: 5 — SIGNIFICANT CHANGE UP (ref 5–8)
PLATELET # BLD AUTO: 183 K/UL — SIGNIFICANT CHANGE UP (ref 150–400)
POTASSIUM SERPL-MCNC: 4.1 MMOL/L — SIGNIFICANT CHANGE UP (ref 3.5–5.3)
POTASSIUM SERPL-SCNC: 4.1 MMOL/L — SIGNIFICANT CHANGE UP (ref 3.5–5.3)
PROT SERPL-MCNC: 7.7 G/DL — SIGNIFICANT CHANGE UP (ref 6–8.3)
PROT UR-MCNC: 30 MG/DL
PROTHROM AB SERPL-ACNC: 13.1 SEC — SIGNIFICANT CHANGE UP (ref 10.6–13.6)
RAPID RVP RESULT: SIGNIFICANT CHANGE UP
RBC # BLD: 3.96 M/UL — LOW (ref 4.2–5.8)
RBC # FLD: 15.4 % — HIGH (ref 10.3–14.5)
SARS-COV-2 RNA SPEC QL NAA+PROBE: SIGNIFICANT CHANGE UP
SODIUM SERPL-SCNC: 137 MMOL/L — SIGNIFICANT CHANGE UP (ref 135–145)
SP GR SPEC: 1.02 — SIGNIFICANT CHANGE UP (ref 1.01–1.02)
TROPONIN I, HIGH SENSITIVITY RESULT: 29.3 NG/L — SIGNIFICANT CHANGE UP
UROBILINOGEN FLD QL: 4
WBC # BLD: 12.85 K/UL — HIGH (ref 3.8–10.5)
WBC # FLD AUTO: 12.85 K/UL — HIGH (ref 3.8–10.5)

## 2021-11-04 PROCEDURE — 71260 CT THORAX DX C+: CPT | Mod: 26

## 2021-11-04 PROCEDURE — 74177 CT ABD & PELVIS W/CONTRAST: CPT | Mod: 26

## 2021-11-04 PROCEDURE — 99223 1ST HOSP IP/OBS HIGH 75: CPT | Mod: GC

## 2021-11-04 PROCEDURE — 71045 X-RAY EXAM CHEST 1 VIEW: CPT | Mod: 26

## 2021-11-04 PROCEDURE — 99285 EMERGENCY DEPT VISIT HI MDM: CPT

## 2021-11-04 PROCEDURE — 93010 ELECTROCARDIOGRAM REPORT: CPT

## 2021-11-04 RX ORDER — SODIUM CHLORIDE 9 MG/ML
1000 INJECTION INTRAMUSCULAR; INTRAVENOUS; SUBCUTANEOUS
Refills: 0 | Status: DISCONTINUED | OUTPATIENT
Start: 2021-11-04 | End: 2021-11-09

## 2021-11-04 RX ORDER — CEFTRIAXONE 500 MG/1
1000 INJECTION, POWDER, FOR SOLUTION INTRAMUSCULAR; INTRAVENOUS EVERY 24 HOURS
Refills: 0 | Status: DISCONTINUED | OUTPATIENT
Start: 2021-11-05 | End: 2021-11-05

## 2021-11-04 RX ORDER — SACCHAROMYCES BOULARDII 250 MG
250 POWDER IN PACKET (EA) ORAL
Refills: 0 | Status: DISCONTINUED | OUTPATIENT
Start: 2021-11-04 | End: 2021-11-09

## 2021-11-04 RX ORDER — ENOXAPARIN SODIUM 100 MG/ML
40 INJECTION SUBCUTANEOUS DAILY
Refills: 0 | Status: DISCONTINUED | OUTPATIENT
Start: 2021-11-04 | End: 2021-11-07

## 2021-11-04 RX ORDER — PIPERACILLIN AND TAZOBACTAM 4; .5 G/20ML; G/20ML
3.38 INJECTION, POWDER, LYOPHILIZED, FOR SOLUTION INTRAVENOUS ONCE
Refills: 0 | Status: COMPLETED | OUTPATIENT
Start: 2021-11-04 | End: 2021-11-04

## 2021-11-04 RX ORDER — ACETAMINOPHEN 500 MG
650 TABLET ORAL EVERY 6 HOURS
Refills: 0 | Status: DISCONTINUED | OUTPATIENT
Start: 2021-11-04 | End: 2021-11-07

## 2021-11-04 RX ORDER — CEFTRIAXONE 500 MG/1
INJECTION, POWDER, FOR SOLUTION INTRAMUSCULAR; INTRAVENOUS
Refills: 0 | Status: DISCONTINUED | OUTPATIENT
Start: 2021-11-04 | End: 2021-11-05

## 2021-11-04 RX ORDER — ACETAMINOPHEN 500 MG
650 TABLET ORAL ONCE
Refills: 0 | Status: COMPLETED | OUTPATIENT
Start: 2021-11-04 | End: 2021-11-04

## 2021-11-04 RX ORDER — ATORVASTATIN CALCIUM 80 MG/1
40 TABLET, FILM COATED ORAL AT BEDTIME
Refills: 0 | Status: DISCONTINUED | OUTPATIENT
Start: 2021-11-04 | End: 2021-11-04

## 2021-11-04 RX ORDER — SODIUM CHLORIDE 9 MG/ML
1850 INJECTION INTRAMUSCULAR; INTRAVENOUS; SUBCUTANEOUS ONCE
Refills: 0 | Status: COMPLETED | OUTPATIENT
Start: 2021-11-04 | End: 2021-11-04

## 2021-11-04 RX ORDER — IPRATROPIUM/ALBUTEROL SULFATE 18-103MCG
3 AEROSOL WITH ADAPTER (GRAM) INHALATION EVERY 6 HOURS
Refills: 0 | Status: DISCONTINUED | OUTPATIENT
Start: 2021-11-04 | End: 2021-11-06

## 2021-11-04 RX ORDER — PYRIDOSTIGMINE BROMIDE 60 MG/5ML
60 SOLUTION ORAL EVERY 4 HOURS
Refills: 0 | Status: DISCONTINUED | OUTPATIENT
Start: 2021-11-04 | End: 2021-11-09

## 2021-11-04 RX ORDER — TRAMADOL HYDROCHLORIDE 50 MG/1
25 TABLET ORAL EVERY 6 HOURS
Refills: 0 | Status: DISCONTINUED | OUTPATIENT
Start: 2021-11-04 | End: 2021-11-09

## 2021-11-04 RX ORDER — ONDANSETRON 8 MG/1
4 TABLET, FILM COATED ORAL EVERY 8 HOURS
Refills: 0 | Status: DISCONTINUED | OUTPATIENT
Start: 2021-11-04 | End: 2021-11-09

## 2021-11-04 RX ORDER — LISINOPRIL 2.5 MG/1
2.5 TABLET ORAL DAILY
Refills: 0 | Status: DISCONTINUED | OUTPATIENT
Start: 2021-11-04 | End: 2021-11-09

## 2021-11-04 RX ORDER — BUDESONIDE AND FORMOTEROL FUMARATE DIHYDRATE 160; 4.5 UG/1; UG/1
2 AEROSOL RESPIRATORY (INHALATION)
Refills: 0 | Status: DISCONTINUED | OUTPATIENT
Start: 2021-11-04 | End: 2021-11-09

## 2021-11-04 RX ORDER — CEFTRIAXONE 500 MG/1
1000 INJECTION, POWDER, FOR SOLUTION INTRAMUSCULAR; INTRAVENOUS ONCE
Refills: 0 | Status: COMPLETED | OUTPATIENT
Start: 2021-11-04 | End: 2021-11-04

## 2021-11-04 RX ORDER — VANCOMYCIN HCL 1 G
1000 VIAL (EA) INTRAVENOUS ONCE
Refills: 0 | Status: COMPLETED | OUTPATIENT
Start: 2021-11-04 | End: 2021-11-04

## 2021-11-04 RX ORDER — OXCARBAZEPINE 300 MG/1
300 TABLET, FILM COATED ORAL
Refills: 0 | Status: DISCONTINUED | OUTPATIENT
Start: 2021-11-04 | End: 2021-11-09

## 2021-11-04 RX ORDER — LANOLIN ALCOHOL/MO/W.PET/CERES
3 CREAM (GRAM) TOPICAL AT BEDTIME
Refills: 0 | Status: DISCONTINUED | OUTPATIENT
Start: 2021-11-04 | End: 2021-11-09

## 2021-11-04 RX ADMIN — PIPERACILLIN AND TAZOBACTAM 200 GRAM(S): 4; .5 INJECTION, POWDER, LYOPHILIZED, FOR SOLUTION INTRAVENOUS at 18:18

## 2021-11-04 RX ADMIN — SODIUM CHLORIDE 1850 MILLILITER(S): 9 INJECTION INTRAMUSCULAR; INTRAVENOUS; SUBCUTANEOUS at 21:30

## 2021-11-04 RX ADMIN — Medication 1000 MILLIGRAM(S): at 21:30

## 2021-11-04 RX ADMIN — Medication 650 MILLIGRAM(S): at 18:18

## 2021-11-04 RX ADMIN — Medication 250 MILLIGRAM(S): at 18:19

## 2021-11-04 RX ADMIN — Medication 650 MILLIGRAM(S): at 21:06

## 2021-11-04 RX ADMIN — SODIUM CHLORIDE 1850 MILLILITER(S): 9 INJECTION INTRAMUSCULAR; INTRAVENOUS; SUBCUTANEOUS at 18:18

## 2021-11-04 RX ADMIN — CEFTRIAXONE 100 MILLIGRAM(S): 500 INJECTION, POWDER, FOR SOLUTION INTRAMUSCULAR; INTRAVENOUS at 20:21

## 2021-11-04 NOTE — H&P ADULT - PROBLEM SELECTOR PLAN 2
Chronic, stable  - Continue Mestinon 60 mg qid  - Follows with Neuro Dr. Bonilla  -Neurology Consulted Dr Bonilla; recs appreciated. Pt complains of 10/10 muscle pain and weakness since last admission, in the setting of fever, diarrhea, abdominal pain and decreased PO intake.  UA shows elevated urine urobilinogen, mildly elevated AST, elevated lactate. Suspect 2/2 to decreased PO intake leading to myopathy.  -HOLD statin in setting of myopathy.  -Pain regimen: Tylenol for mild pain. Tramadol 25 for moderate  -Continue PO prednisone 2.5mg QD  -Continuing NS at this time  -CK Ordered, f/u results

## 2021-11-04 NOTE — H&P ADULT - PROBLEM SELECTOR PLAN 5
- continue oxcarbazpine 300 mg tid  -seizure precautions. Pt found to be hyponatremic on previous admission.  Sent home on Sodium Chloride 1g tabs QD  -Holding at this time as pt is on NS infusion.  -Monitor daily CMP.

## 2021-11-04 NOTE — H&P ADULT - PROBLEM SELECTOR PLAN 6
HTN  Chronic  - BP controlled here  - continue lisinopril 2.5 qd with hold parameters - continue oxcarbazpine 300 mg tid  -seizure precautions.

## 2021-11-04 NOTE — H&P ADULT - PROBLEM SELECTOR PLAN 4
- chronic, stable  - continue prednisone 2.5mg qd, advair diskus 250-50, albuterol 90mcg, albuterol 2.5mg/3mL, elipta 62.5mcg/h. ????????? chronic, stable  88% on admission on RA, 92% on RA while examining  - continue prednisone 2.5mg qd, advair diskus 250-50, Duonebs Q6  -Monitor O2sat

## 2021-11-04 NOTE — H&P ADULT - HISTORY OF PRESENT ILLNESS
77 yo M with PMH BPH, GERD. DM, COPD, CAD, seizures, brain hemagioma s/p laser treatment in 2008, and myasthenia gravis presents to the ED with diarrhea.     Of note patient was hospitalized recently at [facility] 10/18-10/21 for MG exacerbation and received 3 days of IVIG.     In the ED  VS: T101.9 rectal, HR 89, /73, RR 18 SPO2 88% on RA  Labs: WBC 12.85, HH 12.7/38.4, PLTS 183, Na 137, K 4.1, Cr 0.97, Gluc 143, albumin 2.6, lactate 2.3, hs troponin negative x1,   Chest Xray: no acute lung pathology (personal read)  CT chest: pending  CT A/P: pending  EKG:  Given in ED: acetaminophen 650mg PO x1, zosyn, vancomycin 1gm, NS 1.850 L bolus x1 75 yo M with PMH BPH, GERD. DM, COPD, CAD, seizures, brain hemagioma s/p laser treatment in 2008, and myasthenia gravis presents to the ED with diarrhea. Of note patient was hospitalized recently at [facility] 10/18-10/21 for MG exacerbation and received 3 days of IVIG. On day after admission, pt developed 1 bout of watery diarrhea which self-resolved, however daughter believes 2/2 decreased PO intake. He also reports 2 days after admission, he developed muscle pain and weakness located in quadriceps area, 10/10, at rest and with ambulation. Pt states muscle weakness has been so severe he hasn't been able to shave. ROS is positive for fever, abdominal pain, diarrhea (now resolved)    In the ED  VS: T101.9 rectal, HR 89, /73, RR 18 SPO2 88% on RA  Labs: WBC 12.85, HH 12.7/38.4, PLTS 183, Na 137, K 4.1, Cr 0.97, Gluc 143, albumin 2.6, lactate 2.3, hs troponin negative x1,   Chest Xray: no acute lung pathology (personal read)  CT chest: pending  CT A/P: pending  EKG: Normal Sinus Rhythm Normal ECG  Given in ED: acetaminophen 650mg PO x1, zosyn, vancomycin 1gm, NS 1.850 L bolus x1 75 yo M with PMH BPH, GERD. DM, COPD, CAD, seizures, brain hemagioma s/p laser treatment in 2008, and myasthenia gravis presents to the ED with diarrhea. Of note patient was hospitalized recently at [facility] 10/18-10/21 for MG exacerbation and received 3 days of IVIG. On day after admission, pt developed 1 bout of watery diarrhea which self-resolved, however daughter believes 2/2 decreased PO intake and appetite. He also reports 2 days after admission, he developed muscle pain and weakness located in quadriceps area, 10/10, at rest and with ambulation. Pt states muscle weakness has been so severe he hasn't been able to shave. ROS is positive for fever, abdominal pain, 1 episode of diarrhea (now resolved), decreased appetite, chronic cough, muscle weakness, muscle pain. ROS is negative for chills, n/v, SOB, cp, palpitations, changes in vision or hearing.    In the ED  VS: T101.9 rectal, HR 89, /73, RR 18 SPO2 88% on RA  Labs: WBC 12.85, HH 12.7/38.4, PLTS 183, Na 137, K 4.1, Cr 0.97, Gluc 143, albumin 2.6, lactate 2.3, hs troponin negative x1,   Chest Xray: no acute lung pathology (personal read)  CT chest: pending  CT A/P: pending  EKG: Normal Sinus Rhythm Normal ECG  Given in ED: acetaminophen 650mg PO x1, zosyn, vancomycin 1gm, NS 1.850 L bolus x1

## 2021-11-04 NOTE — H&P ADULT - PROBLEM SELECTOR PLAN 9
- chronic, stable  - glu 143 on admission  - not on home medication  - hypoglycemia protocol ordered. If FS running high (>200) can start on sliding scale coverage  - daily CMPs. - chronic, stable  -not on any home medications.   -f/u outpatient with PCP.

## 2021-11-04 NOTE — H&P ADULT - NSHPSOCIALHISTORY_GEN_ALL_CORE
Pt is , going through divorce. Currently lives at home alone, and is independent with ambulation and ADLs. Denies tobacco, alcohol, or recreational drug use.

## 2021-11-04 NOTE — H&P ADULT - NSHPPHYSICALEXAM_GEN_ALL_CORE
T(C): 38.8 (11-04-21 @ 16:59), Max: 38.8 (11-04-21 @ 16:59)  HR: 89 (11-04-21 @ 16:26) (89 - 89)  BP: 117/73 (11-04-21 @ 16:26) (117/73 - 117/73)  RR: 18 (11-04-21 @ 16:26) (18 - 18)  SpO2: 88% (11-04-21 @ 16:26) (88% - 88%)    GENERAL: patient appears well, no acute distress, appropriate, pleasant  EYES: sclera clear, no exudates. Left Eye Ptosis.  ENMT: oropharynx clear without erythema, no exudates, moist mucous membranes  NECK: supple, soft, no thyromegaly noted  LUNGS: good air entry bilaterally, clear to auscultation, symmetric breath sounds, no wheezing or rhonchi appreciated  HEART: soft S1/S2, regular rate and rhythm, no murmurs noted, no lower extremity edema  GASTROINTESTINAL: abdomen is soft, nondistended, normoactive bowel sounds, no palpable masses. Tenderness to palpation in LLQ on light and deep palpation.  INTEGUMENT: good skin turgor, warm skin, appears well perfused  MUSCULOSKELETAL: no clubbing or cyanosis, no obvious deformity. 4/5 Muscle strength on hip flexion, arm flexion, arm extension, and  strength. 5/5 strength with ankle dorsiflexion and plantar flexion.  NEUROLOGIC: awake, alert, oriented x3, good muscle tone in 4 extremities, no obvious sensory deficits  PSYCHIATRIC: mood is good, affect is congruent, linear and logical thought process  HEME/LYMPH: no palpable supraclavicular nodules, no obvious ecchymosis or petechiae T(C): 38.8 (11-04-21 @ 16:59), Max: 38.8 (11-04-21 @ 16:59)  HR: 89 (11-04-21 @ 16:26) (89 - 89)  BP: 117/73 (11-04-21 @ 16:26) (117/73 - 117/73)  RR: 18 (11-04-21 @ 16:26) (18 - 18)  SpO2: 88% (11-04-21 @ 16:26) (88% - 88%)    GENERAL: patient appears well, no acute distress, appropriate, pleasant  EYES: sclera clear, no exudates. Left Eye Ptosis.  ENMT: oropharynx clear without erythema, no exudates, moist mucous membranes  NECK: supple, soft, no thyromegaly noted  LUNGS: good air entry bilaterally, clear to auscultation, symmetric breath sounds, no wheezing or rhonchi appreciated  HEART: soft S1/S2, regular rate and rhythm, no murmurs noted, no lower extremity edema  GASTROINTESTINAL: abdomen is soft, nondistended, normoactive bowel sounds, no palpable masses. Tenderness to palpation in LLQ on light and deep palpation.  INTEGUMENT: good skin turgor, warm skin, appears well perfused  MUSCULOSKELETAL: no clubbing or cyanosis, no obvious deformity. 4/5 Muscle strength on hip flexion, arm flexion, arm extension, and  strength. 5/5 strength with ankle dorsiflexion and plantar flexion.  NEUROLOGIC: awake, alert, oriented x3, good muscle tone in 4 extremities, no obvious sensory deficits. CN 2-12 intact [mild L eye ptosis noted]  PSYCHIATRIC: mood is good, affect is congruent, linear and logical thought process  HEME/LYMPH: no palpable supraclavicular nodules, no obvious ecchymosis or petechiae

## 2021-11-04 NOTE — ED PROVIDER NOTE - CLINICAL SUMMARY MEDICAL DECISION MAKING FREE TEXT BOX
presents today c/o body aches. pt reports he was discharged from hospital a week ago in which he has not been able to get out of bed due to generalized weakness. Reports he developed a cough 2 days after discharge. pt admits to multiple episodes of diarrhea. Plan includes labs, sepsis work up, IVF, IV abx, CXR r/o pneumonia, UA r/o UTI, re-assess

## 2021-11-04 NOTE — ED ADULT NURSE NOTE - OBJECTIVE STATEMENT
Pt A/o x 4, presents with c/o generalized body weakness x 2 days, as per pt he is fully vaccinated against covid-19. Pt appears comfortable in no apparent distress, pending further evaluation by MD. Will continue to monitor closely.

## 2021-11-04 NOTE — H&P ADULT - NSHPREVIEWOFSYSTEMS_GEN_ALL_CORE
My findings and recommendations are based on patient's symptoms, eye exam, diagnostic testing, and records. Constitutional: + fever. Denies chills, sweating  HEENT: denies headache, dizziness, or lightheadedness  Respiratory:  + Cough. denies SOB, or wheezing  Cardiovascular: denies CP, palpitations  Gastrointestinal: +diarrhea, decreased PO intake, decreased appetite. denies nausea, vomiting, constipation, abdominal pain, or bloody stools  Genitourinary: denies painful urination, increased frequency, urgency, or bloody urine  Skin/Breast: denies rashes or itching  Musculoskeletal: + muscle aches, muscle weakness. Denies joint swelling  Neurologic: denies loss of sensation, numbness, or tingling  ROS negative except as noted above Constitutional: + fever. Denies chills, sweating  HEENT: denies headache, dizziness, or lightheadedness  Respiratory:  + Cough. denies SOB, or wheezing  Cardiovascular: denies CP, palpitations  Gastrointestinal: +diarrhea, decreased PO intake, decreased appetite. denies nausea, vomiting, constipation, abdominal pain, or bloody stools/melena  Genitourinary: denies painful urination, increased frequency, urgency, or bloody urine  Skin/Breast: denies rashes or itching  Musculoskeletal: + muscle aches, muscle weakness. Denies joint swelling  Neurologic: denies loss of sensation, numbness, or tingling  ROS negative except as noted above

## 2021-11-04 NOTE — ED PROVIDER NOTE - PHYSICAL EXAMINATION
Constitutional: Awake, Alert, non-toxic. NAD. Well appearing, well nourished.   HEAD: Normocephalic, atraumatic.   EYES: EOM intact, conjunctiva and sclera are clear bilaterally. No raccoon eyes.   ENT: No rhinorrhea, normal pharynx, patent, no tonsillar exudate or enlargement, mucous membranes pink/moist, no erythema, no drooling or stridor.   NECK: Supple, non-tender  CARDIOVASCULAR: Normal S1, S2; regular rate and rhythm.  RESPIRATORY: Normal respiratory effort; breath sounds CTAB, no wheezes, rhonchi, or rales. Speaking in full sentences. No accessory muscle use.   ABDOMEN: Soft; non-tender, non-distended.  EXTREMITIES: Full passive and active ROM in all extremities; non-tender to palpation; distal pulses palpable and symmetric, no edema, no crepitus or step off  SKIN: Warm, dry; good skin turgor, no apparent lesions or rashes, no ecchymosis, brisk capillary refill.  NEURO: A&O x3. Sensory and motor functions are grossly intact. Speech is normal. Appearance and judgement seem appropriate for gender and age. No neurological deficits. Neurovascular sensation intact motor function 5/5 of upper and lower extremities

## 2021-11-04 NOTE — ED ADULT NURSE NOTE - CAS EDN DISCHARGE ASSESSMENT
Rest. Warm or cold compresses. Ibuprofen 600-800 mg every 8 hours; take with food. Add Percocet for moderate to severe pain. No driving with this medication. Complete course of medrol dose sera; take with food.   Follow up with your primary care as scheduled on Monday.       Back Pain (Acute Or Chronic)    Back pain is one of the most common problems The good news is that most people feel better in 1 to 2 weeks, and most of the rest in 1 to 2 months. Most people can remain active.  Symptoms  People experience and describe pain differently; not everyone is the same.  · The pain can be sharp, stabbing, shooting, aching, cramping or burning.  · Movement, standing, bending, lifting, sitting, or walking may worsen pain.  · It can be localized to one spot or area, or it can be more generalized.  · It can spread or radiate upwards, to the front, or go down your arms or legs (sciatica).  · It can cause muscle spasm.  Causes  Most of the time, \"mechanical problems\" with the muscles or spine cause the pain. Mechanical problems are usually caused by an injury to the muscles or ligaments. While illness can cause back pain, it is usually not caused by a serious illness.  · Physical activity such as sports, exercise, work, or normal activity  · Overexertion, lifting, pushing, pulling incorrectly or too aggressively  · Sudden twisting, bending, or stretching from an accident, or accidental movement  · Poor posture  · Stretching or moving wrong, without noticing pain at the time  · Poor coordination, lack of regular exercise (check with your doctor about this)  · Spinal disc disease or arthritis  · Stress  Pain can also be related to pregnancy, or illness like appendicitis, bladder or kidney infections, pelvic infections, and many other things.     Acute back pain usually gets better in 1 to 2 weeks. Back pain related to disk disease, arthritis in the spinal joints or spinal stenosis (narrowing of the spinal canal) can become  chronic and last for months or years.  Unless you had a physical injury (for example, a car accident or fall) X-rays are usually not needed for the initial evaluation of back pain. If pain continues and does not respond to medical treatment, X-rays and other tests may be done at a later time.  Home care  Try these home care recommendations:  1. When in bed, try to find a position of comfort. A firm mattress is best. Try lying flat on your back with pillows under your knees. You can also try lying on your side with your knees bent up towards your chest and a pillow between your knees.  2. At first, do not try to stretch out the sore spots. If there is a strain, it is not like the good soreness you get after exercising without an injury. In this case, stretching may make it worse.  3. Avoid prolong sitting, long car rides, or travel. This puts more stress on the lower back than standing or walking.  4. During the first 24 to 72 hours after an acute injury or flare up of chronic back pain, apply an ice pack to the painful area for 20 minutes and then remove it for 20 minutes over a period of 60 to 90 minutes or several times a day. This will reduce swelling and pain. Wrap the ice pack in a think towel or plastic to protect your skin.  5. You can start with ice, then switch to heat. Heat (hot shower, hot bath, or heating pad) reduces swelling and pain and works well for muscle spasms. Heat can be applied to the painful area for 20 minutes then remove it for 20 minutes over a period of 60 to 90 minutes or several times a day. Do not sleep on a heating pad. It can lead to skin burns or tissue damage.  6. You can alternate ice and heat therapy. Talk with your doctor about the best treatment for your back pain.  7. Therapeutic massage can help relax the back muscles without stretching them.  8. Be aware of safe lifting methods and do not lift anything without stretching first.  Medicines  Talk to your doctor before using  medications, especially if you have other medical problems or are taking other medicines.  · You may use acetaminophen or ibuprofen to control pain, unless another pain medicine was prescribed. If you have chronic conditions like diabetes, liver or kidney disease, stomach ulcers, or gastrointestinal bleeding, or are taking blood thinners talk to your doctor before taking any medication.  · Be careful if you are given a prescription medicines, narcotics, or medication for muscle spasms. They can cause drowsiness, affect your coordination, reflexes, and judgement. Do not drive or operate heavy machinery.  Follow-up care  Follow up with your doctor or this facility if your symptoms do not start to improve after one week. Physical therapy may be needed.  If X-rays were taken, they will be reviewed by a radiologist. You will be notified of any new findings that may affect your care.  Call 911  Call emergency services if any of the following occur:  · Trouble breathing  · Confusion  · Very drowsy or trouble awakening  · Fainting or loss of consciousness  · Rapid or very slow heart rate  · Loss of bowel or bladder control  When to seek medical care  Get prompt medical attention if any of the following occur:  · Pain becomes worse or spreads to your legs  · Weakness or numbness in one or both legs  · Numbness in the groin or genital area  © 5654-4358 [employer]. [address] [place] [address], [place] [address]. All rights reserved. This information is not intended as a substitute for professional medical care. Always follow your healthcare professional's instructions.         Alert and oriented to person, place and time

## 2021-11-04 NOTE — H&P ADULT - PROBLEM SELECTOR PLAN 10
chronic, stable - chronic, stable  - glu 143 on admission  - not on home medication  - hypoglycemia protocol ordered. If FS running high (>200) can start on sliding scale coverage  - daily CMPs.

## 2021-11-04 NOTE — H&P ADULT - PROBLEM SELECTOR PLAN 3
Chronic, stable  - Continue Mestinon 60 mg qid  - Follows with Neuro Dr. Bonilla  -Neurology Consulted Dr Bonilla; recs appreciated. Chronic, stable  - Continue Mestinon 60 mg Q4 hrs  - Follows with Neuro Dr. Bonilla  -Neurology Consulted Dr Bonilla; recs appreciated.

## 2021-11-04 NOTE — ED PROVIDER NOTE - ATTENDING CONTRIBUTION TO CARE
75yo M with ho myasthenia gravis, recent amdission for IVIG, COPD, DM, states since discharge has been feeling weakness, unable to walk, has body aches, cough, diarrhea,  on exam neuro intact, lungs clear, coughing, abd nontender  febrile on exam, sats 91-92, will check labs, cultures, broad spectrum abx, admit

## 2021-11-04 NOTE — H&P ADULT - ASSESSMENT
77 yo M with PMH BPH, GERD. DM, COPD, CAD, seizures, brain hemagioma s/p laser treatment in 2008, and myasthenia gravis presents to the ED with diarrhea. Admitted for infectious workup.

## 2021-11-04 NOTE — ED PROVIDER NOTE - OBJECTIVE STATEMENT
75 y/o male with PMHx Myasthenia Gravis, COPD, CAD, and DM presents today c/o body aches. pt reports he was discharged from hospital a week ago in which he has not been able to get out of bed due to generalized weakness. Reports he developed a cough 2 days after discharge. pt admits to multiple episodes of diarrhea. pt denies abd pain, vomiting, chest pain, SOB, headache, respiratory distress, or any other complaints.

## 2021-11-04 NOTE — H&P ADULT - PROBLEM SELECTOR PLAN 8
- chronic, stable  -not on any home medications.   -f/u outpatient with PCP. chronic, stable  - continue atorvastatin 40 mg qd.

## 2021-11-04 NOTE — H&P ADULT - PROBLEM SELECTOR PLAN 1
-patient presenting with acute diarrhea, leukocytosis, hypoxia. Suspect gastroenteritis  - leukocytosis could also be due to recent steroid use  - CT A/P ordered. f/u results  - GI PCR ordered  - no hx of recent abx use or prior C-diff infection. hold off on C-diff PCR for now  - Clear liquid diet for now, advance as tolerated  - s/p vanc and zosyn in ED. hold off on further abx for now.  - f/u bcx x2  - f/u urine cx  - f/u RVP  - gentle IV hydration with NS 100cc/hr stop after 10 hrs.  - GI Dr. Arroyo consulted, f/u recs Patient presenting with acute diarrhea, leukocytosis, hypoxia. Suspect gastroenteritis  CTAP + CT Chest: No evidence of pneumonia. No intra-abdominal pathology.  - leukocytosis likely 2/2 to PO steroid use  - no hx of recent abx use or prior C-diff infection. hold off on C-diff PCR for now as Diarrhea resolved.  - Diet regular for now. Pt can tolerate PO, however decreased appetite.  - s/p vanc and zosyn in ED. Continue IV Rocephin 1g/day.  - f/u bcx x2  - f/u urine cx  - f/u RVP  - gentle IV hydration with NS 100cc/hr stop after 10 hrs.  - ID Dr. Phillip consulted, f/u recs. Patient presenting with acute diarrhea, leukocytosis, hypoxia. Suspect gastroenteritis  CTAP + CT Chest: No evidence of pneumonia. No intra-abdominal pathology.  - leukocytosis likely 2/2 to PO steroid use  - no hx of recent abx use or prior C-diff infection. hold off on C-diff PCR for now as Diarrhea resolved.  - Diet regular for now. Pt can tolerate PO, however decreased appetite.  - s/p vanc and zosyn in ED. Continue IV Rocephin 1g/day.  - f/u bcx x2  - f/u urine cx  - f/u RVP  - gentle IV hydration with NS 50cc/hr stop after 10 hrs.  - ID Dr. Phillip consulted, f/u recs. Patient presenting with acute diarrhea, leukocytosis, hypoxia. Suspect gastroenteritis or URI  CTAP + CT Chest: No evidence of pneumonia. No intra-abdominal pathology.  - leukocytosis likely 2/2 to PO steroid use  - no hx of recent abx use or prior C-diff infection. hold off on C-diff PCR for now as Diarrhea resolved.  - Diet regular for now. Pt can tolerate PO, however decreased appetite.  - s/p vanc and zosyn in ED. Continue IV Rocephin 1g/day.  - f/u bcx x2  - f/u urine cx  - f/u RVP  - gentle IV hydration with NS 50cc/hr stop after 10 hrs.  - ID Dr. Phillip consulted, f/u recs. Patient presenting with acute diarrhea, leukocytosis, hypoxia. Suspect gastroenteritis or URI  CTAP + CT Chest: No evidence of pneumonia. No intra-abdominal pathology.  - leukocytosis likely 2/2 to PO steroid use  - no hx of recent abx use or prior C-diff infection. hold off on C-diff PCR for now as Diarrhea resolved.  - Diet regular for now. Pt can tolerate PO, however decreased appetite.  - s/p vanc and zosyn in ED. Continue IV Rocephin 1g/day.  - f/u bcx x2. Repeat lactic acid in AM  - f/u urine cx  - f/u RVP  - gentle IV hydration with NS 50cc/hr stop after 10 hrs.  - ID Dr. Phillip consulted, f/u recs.

## 2021-11-04 NOTE — ED PROVIDER NOTE - NSICDXPASTMEDICALHX_GEN_ALL_CORE_FT
PAST MEDICAL HISTORY:  Benign prostatic hyperplasia     CAD (coronary artery disease)     COPD without exacerbation     Diabetes mellitus     GERD (gastroesophageal reflux disease)     Myasthenia gravis     Seizures

## 2021-11-04 NOTE — H&P ADULT - ATTENDING COMMENTS
75 yo M with PMH BPH, GERD. DM, COPD, CAD, seizures, brain hemagioma s/p laser treatment in 2008, and myasthenia gravis presents to the ED with diarrhea. Admitted for infectious workup.    HPI as above.     T(C): 38.8 (11-04-21 @ 16:59), Max: 38.8 (11-04-21 @ 16:59)  HR: 89 (11-04-21 @ 16:26) (89 - 89)  BP: 117/73 (11-04-21 @ 16:26) (117/73 - 117/73)  RR: 18 (11-04-21 @ 16:26) (18 - 18)  SpO2: 88% (11-04-21 @ 16:26) (88% - 88%)  Wt(kg): --    Physical Exam:   GENERAL: well-groomed, well-developed, NAD  HEENT: head NC/AT; EOM intact, PERRLA, conjunctiva & sclera clear; hearing grossly intact, moist mucous membranes  NECK: supple, no JVD  RESPIRATORY: CTA B/L, no wheezing, rales, rhonchi or rubs  CARDIOVASCULAR: S1&S2, RRR, no murmurs or gallops  ABDOMEN: soft, non-tender, non-distended, + Bowel sounds x4 quadrants, no guarding, rebound or rigidity  MUSCULOSKELETAL:  no clubbing, cyanosis or edema of all 4 extremities  LYMPH: no cervical lymphadenopathy  VASCULAR: Radial pulses 2+ bilaterally, no varicose veins   SKIN: warm and dry, color normal  NEUROLOGIC: AA&O X3, CN2-12 intact aside from chronic L eye ptosis, no sensory loss, motor Strength 4/5 in UE & LE B/L  Psych: Normal mood and affect, normal behavior    Plan:   Suspect viral URI  -check RVP.   -no evidence of infiltrate on CXR, f/u CT scan results  -will place on empiric abx and consult ID  -consult neuro given hx of recent Myasthenia gravis exac.   -remainder as above 75 yo M with PMH BPH, GERD. DM, COPD, CAD, seizures, brain hemagioma s/p laser treatment in 2008, and myasthenia gravis presents to the ED with diarrhea. Admitted for infectious workup.    HPI as above.     T(C): 38.8 (11-04-21 @ 16:59), Max: 38.8 (11-04-21 @ 16:59)  HR: 89 (11-04-21 @ 16:26) (89 - 89)  BP: 117/73 (11-04-21 @ 16:26) (117/73 - 117/73)  RR: 18 (11-04-21 @ 16:26) (18 - 18)  SpO2: 88% (11-04-21 @ 16:26) (88% - 88%)  Wt(kg): --    Physical Exam:   GENERAL: well-groomed, well-developed, NAD  HEENT: head NC/AT; EOM intact, PERRLA, conjunctiva & sclera clear; hearing grossly intact, moist mucous membranes  NECK: supple, no JVD  RESPIRATORY: CTA B/L, no wheezing, rales, rhonchi or rubs  CARDIOVASCULAR: S1&S2, RRR, no murmurs or gallops  ABDOMEN: soft, non-tender, non-distended, + Bowel sounds x4 quadrants, no guarding, rebound or rigidity  MUSCULOSKELETAL:  no clubbing, cyanosis or edema of all 4 extremities  LYMPH: no cervical lymphadenopathy  VASCULAR: Radial pulses 2+ bilaterally, no varicose veins   SKIN: warm and dry, color normal  NEUROLOGIC: AA&O X3, CN2-12 intact aside from chronic L eye ptosis, no sensory loss, motor Strength 4/5 in UE & LE B/L  Psych: Normal mood and affect, normal behavior    Plan:   Suspect viral URI  -check RVP.   -no evidence of infiltrate on CXR, f/u CT scan results  -will place on empiric abx and consult ID  -consult neuro given hx of recent Myasthenia gravis exac.   anemia: no signs or symptoms of active bleeding. Continue to monitor hgb.   -remainder as above

## 2021-11-04 NOTE — ED ADULT TRIAGE NOTE - CHIEF COMPLAINT QUOTE
c/o generalyzed body aches,started today,fully vaccinated for COVID,hx of COPD, in no acute distress

## 2021-11-05 ENCOUNTER — TRANSCRIPTION ENCOUNTER (OUTPATIENT)
Age: 76
End: 2021-11-05

## 2021-11-05 LAB
ALBUMIN SERPL ELPH-MCNC: 2.3 G/DL — LOW (ref 3.3–5)
ALP SERPL-CCNC: 62 U/L — SIGNIFICANT CHANGE UP (ref 40–120)
ALT FLD-CCNC: 69 U/L — SIGNIFICANT CHANGE UP (ref 12–78)
ANION GAP SERPL CALC-SCNC: 6 MMOL/L — SIGNIFICANT CHANGE UP (ref 5–17)
AST SERPL-CCNC: 56 U/L — HIGH (ref 15–37)
BASOPHILS # BLD AUTO: 0.04 K/UL — SIGNIFICANT CHANGE UP (ref 0–0.2)
BASOPHILS NFR BLD AUTO: 0.4 % — SIGNIFICANT CHANGE UP (ref 0–2)
BILIRUB SERPL-MCNC: 0.6 MG/DL — SIGNIFICANT CHANGE UP (ref 0.2–1.2)
BUN SERPL-MCNC: 19 MG/DL — SIGNIFICANT CHANGE UP (ref 7–23)
CALCIUM SERPL-MCNC: 8.5 MG/DL — SIGNIFICANT CHANGE UP (ref 8.5–10.1)
CHLORIDE SERPL-SCNC: 107 MMOL/L — SIGNIFICANT CHANGE UP (ref 96–108)
CO2 SERPL-SCNC: 27 MMOL/L — SIGNIFICANT CHANGE UP (ref 22–31)
COVID-19 NUCLEOCAPSID GAM AB INTERP: NEGATIVE — SIGNIFICANT CHANGE UP
COVID-19 NUCLEOCAPSID TOTAL GAM ANTIBODY RESULT: 0.84 INDEX — SIGNIFICANT CHANGE UP
COVID-19 SPIKE DOMAIN AB INTERP: POSITIVE
COVID-19 SPIKE DOMAIN ANTIBODY RESULT: 247 U/ML — HIGH
CREAT SERPL-MCNC: 0.86 MG/DL — SIGNIFICANT CHANGE UP (ref 0.5–1.3)
CRP SERPL-MCNC: 171 MG/L — HIGH
CULTURE RESULTS: SIGNIFICANT CHANGE UP
EOSINOPHIL # BLD AUTO: 0.11 K/UL — SIGNIFICANT CHANGE UP (ref 0–0.5)
EOSINOPHIL NFR BLD AUTO: 1.1 % — SIGNIFICANT CHANGE UP (ref 0–6)
ERYTHROCYTE [SEDIMENTATION RATE] IN BLOOD: 94 MM/HR — HIGH (ref 0–20)
GLUCOSE SERPL-MCNC: 114 MG/DL — HIGH (ref 70–99)
GRAM STN FLD: SIGNIFICANT CHANGE UP
HCT VFR BLD CALC: 36 % — LOW (ref 39–50)
HGB BLD-MCNC: 11.8 G/DL — LOW (ref 13–17)
IMM GRANULOCYTES NFR BLD AUTO: 0.5 % — SIGNIFICANT CHANGE UP (ref 0–1.5)
LACTATE SERPL-SCNC: 1 MMOL/L — SIGNIFICANT CHANGE UP (ref 0.7–2)
LYMPHOCYTES # BLD AUTO: 0.97 K/UL — LOW (ref 1–3.3)
LYMPHOCYTES # BLD AUTO: 9.4 % — LOW (ref 13–44)
MAGNESIUM SERPL-MCNC: 2.2 MG/DL — SIGNIFICANT CHANGE UP (ref 1.6–2.6)
MCHC RBC-ENTMCNC: 31.8 PG — SIGNIFICANT CHANGE UP (ref 27–34)
MCHC RBC-ENTMCNC: 32.8 GM/DL — SIGNIFICANT CHANGE UP (ref 32–36)
MCV RBC AUTO: 97 FL — SIGNIFICANT CHANGE UP (ref 80–100)
METHOD TYPE: SIGNIFICANT CHANGE UP
MONOCYTES # BLD AUTO: 0.83 K/UL — SIGNIFICANT CHANGE UP (ref 0–0.9)
MONOCYTES NFR BLD AUTO: 8 % — SIGNIFICANT CHANGE UP (ref 2–14)
NEUTROPHILS # BLD AUTO: 8.32 K/UL — HIGH (ref 1.8–7.4)
NEUTROPHILS NFR BLD AUTO: 80.6 % — HIGH (ref 43–77)
NRBC # BLD: 0 /100 WBCS — SIGNIFICANT CHANGE UP (ref 0–0)
PHOSPHATE SERPL-MCNC: 3.3 MG/DL — SIGNIFICANT CHANGE UP (ref 2.5–4.5)
PLATELET # BLD AUTO: 174 K/UL — SIGNIFICANT CHANGE UP (ref 150–400)
POTASSIUM SERPL-MCNC: 3.8 MMOL/L — SIGNIFICANT CHANGE UP (ref 3.5–5.3)
POTASSIUM SERPL-SCNC: 3.8 MMOL/L — SIGNIFICANT CHANGE UP (ref 3.5–5.3)
PROT SERPL-MCNC: 7 G/DL — SIGNIFICANT CHANGE UP (ref 6–8.3)
RBC # BLD: 3.71 M/UL — LOW (ref 4.2–5.8)
RBC # FLD: 15.4 % — HIGH (ref 10.3–14.5)
RHEUMATOID FACT SERPL-ACNC: 17 IU/ML — HIGH (ref 0–13)
SALMONELLA DNA BLD POS QL NAA+NON-PROBE: SIGNIFICANT CHANGE UP
SARS-COV-2 IGG+IGM SERPL QL IA: 0.84 INDEX — SIGNIFICANT CHANGE UP
SARS-COV-2 IGG+IGM SERPL QL IA: 247 U/ML — HIGH
SARS-COV-2 IGG+IGM SERPL QL IA: NEGATIVE — SIGNIFICANT CHANGE UP
SARS-COV-2 IGG+IGM SERPL QL IA: POSITIVE
SODIUM SERPL-SCNC: 140 MMOL/L — SIGNIFICANT CHANGE UP (ref 135–145)
SPECIMEN SOURCE: SIGNIFICANT CHANGE UP
SPECIMEN SOURCE: SIGNIFICANT CHANGE UP
URATE SERPL-MCNC: 3.6 MG/DL — SIGNIFICANT CHANGE UP (ref 3.4–8.8)
WBC # BLD: 10.32 K/UL — SIGNIFICANT CHANGE UP (ref 3.8–10.5)
WBC # FLD AUTO: 10.32 K/UL — SIGNIFICANT CHANGE UP (ref 3.8–10.5)

## 2021-11-05 PROCEDURE — 73562 X-RAY EXAM OF KNEE 3: CPT | Mod: 26,LT

## 2021-11-05 PROCEDURE — 93971 EXTREMITY STUDY: CPT | Mod: 26,LT

## 2021-11-05 PROCEDURE — 99222 1ST HOSP IP/OBS MODERATE 55: CPT

## 2021-11-05 PROCEDURE — 99233 SBSQ HOSP IP/OBS HIGH 50: CPT | Mod: GC

## 2021-11-05 RX ORDER — PIPERACILLIN AND TAZOBACTAM 4; .5 G/20ML; G/20ML
3.38 INJECTION, POWDER, LYOPHILIZED, FOR SOLUTION INTRAVENOUS EVERY 8 HOURS
Refills: 0 | Status: DISCONTINUED | OUTPATIENT
Start: 2021-11-05 | End: 2021-11-06

## 2021-11-05 RX ADMIN — PYRIDOSTIGMINE BROMIDE 60 MILLIGRAM(S): 60 SOLUTION ORAL at 01:21

## 2021-11-05 RX ADMIN — PYRIDOSTIGMINE BROMIDE 60 MILLIGRAM(S): 60 SOLUTION ORAL at 21:24

## 2021-11-05 RX ADMIN — OXCARBAZEPINE 300 MILLIGRAM(S): 300 TABLET, FILM COATED ORAL at 21:24

## 2021-11-05 RX ADMIN — BUDESONIDE AND FORMOTEROL FUMARATE DIHYDRATE 2 PUFF(S): 160; 4.5 AEROSOL RESPIRATORY (INHALATION) at 22:03

## 2021-11-05 RX ADMIN — PYRIDOSTIGMINE BROMIDE 60 MILLIGRAM(S): 60 SOLUTION ORAL at 10:34

## 2021-11-05 RX ADMIN — ENOXAPARIN SODIUM 40 MILLIGRAM(S): 100 INJECTION SUBCUTANEOUS at 12:32

## 2021-11-05 RX ADMIN — OXCARBAZEPINE 300 MILLIGRAM(S): 300 TABLET, FILM COATED ORAL at 06:05

## 2021-11-05 RX ADMIN — PYRIDOSTIGMINE BROMIDE 60 MILLIGRAM(S): 60 SOLUTION ORAL at 17:40

## 2021-11-05 RX ADMIN — PYRIDOSTIGMINE BROMIDE 60 MILLIGRAM(S): 60 SOLUTION ORAL at 14:37

## 2021-11-05 RX ADMIN — BUDESONIDE AND FORMOTEROL FUMARATE DIHYDRATE 2 PUFF(S): 160; 4.5 AEROSOL RESPIRATORY (INHALATION) at 06:31

## 2021-11-05 RX ADMIN — PIPERACILLIN AND TAZOBACTAM 25 GRAM(S): 4; .5 INJECTION, POWDER, LYOPHILIZED, FOR SOLUTION INTRAVENOUS at 21:24

## 2021-11-05 RX ADMIN — Medication 250 MILLIGRAM(S): at 06:05

## 2021-11-05 RX ADMIN — PYRIDOSTIGMINE BROMIDE 60 MILLIGRAM(S): 60 SOLUTION ORAL at 06:05

## 2021-11-05 RX ADMIN — Medication 2.5 MILLIGRAM(S): at 06:06

## 2021-11-05 RX ADMIN — Medication 250 MILLIGRAM(S): at 17:40

## 2021-11-05 RX ADMIN — OXCARBAZEPINE 300 MILLIGRAM(S): 300 TABLET, FILM COATED ORAL at 14:37

## 2021-11-05 RX ADMIN — LISINOPRIL 2.5 MILLIGRAM(S): 2.5 TABLET ORAL at 06:04

## 2021-11-05 NOTE — DISCHARGE NOTE PROVIDER - NSDCMRMEDTOKEN_GEN_ALL_CORE_FT
Advair Diskus 250 mcg-50 mcg inhalation powder: 1 puff(s) inhaled 2 times a day  albuterol 2.5 mg/3 mL (0.083%) inhalation solution: 3 milliliter(s) inhaled every 6 hours  albuterol 90 mcg/inh inhalation aerosol: 2 puff(s) inhaled every 6 hours  Crestor 10 mg oral tablet: 1 tab(s) orally once a day  Incruse Ellipta 62.5 mcg/inh inhalation powder: 1 puff(s) inhaled every 24 hours  lisinopril 2.5 mg oral tablet: 1 tab(s) orally once a day  OXcarbazepine 300 mg oral tablet: 1 tab(s) orally 3 times a day  predniSONE 2.5 mg oral tablet: 1 tab(s) orally once a day  pyridostigmine 60 mg oral tablet: 1 tab(s) orally every 4 hours   sodium chloride 1 g oral tablet: 1 tab(s) orally once a day    Advair Diskus 250 mcg-50 mcg inhalation powder: 1 puff(s) inhaled 2 times a day  albuterol 2.5 mg/3 mL (0.083%) inhalation solution: 3 milliliter(s) inhaled every 6 hours  albuterol 90 mcg/inh inhalation aerosol: 2 puff(s) inhaled every 6 hours  Crestor 10 mg oral tablet: 1 tab(s) orally once a day  Incruse Ellipta 62.5 mcg/inh inhalation powder: 1 puff(s) inhaled every 24 hours  lisinopril 2.5 mg oral tablet: 1 tab(s) orally once a day  OXcarbazepine 300 mg oral tablet: 1 tab(s) orally 3 times a day  predniSONE 2.5 mg oral tablet: 1 tab(s) orally once a day  pyridostigmine 60 mg oral tablet: 1 tab(s) orally every 4 hours   rolling walker: 1 unit(s)   Dx: R53.1 Weakness  sodium chloride 1 g oral tablet: 1 tab(s) orally once a day    Advair Diskus 250 mcg-50 mcg inhalation powder: 1 puff(s) inhaled 2 times a day  albuterol 2.5 mg/3 mL (0.083%) inhalation solution: 3 milliliter(s) inhaled every 6 hours  albuterol 90 mcg/inh inhalation aerosol: 2 puff(s) inhaled every 6 hours  Cipro 500 mg oral tablet: 1 tab(s) orally 2 times a day   Crestor 10 mg oral tablet: 1 tab(s) orally once a day  Incruse Ellipta 62.5 mcg/inh inhalation powder: 1 puff(s) inhaled every 24 hours  lisinopril 2.5 mg oral tablet: 1 tab(s) orally once a day  OXcarbazepine 300 mg oral tablet: 1 tab(s) orally 3 times a day  predniSONE 2.5 mg oral tablet: 1 tab(s) orally once a day  pyridostigmine 60 mg oral tablet: 1 tab(s) orally every 4 hours   rolling walker: 1 unit(s)   Dx: R53.1 Weakness  sodium chloride 1 g oral tablet: 1 tab(s) orally once a day    Advair Diskus 250 mcg-50 mcg inhalation powder: 1 puff(s) inhaled 2 times a day  albuterol 2.5 mg/3 mL (0.083%) inhalation solution: 3 milliliter(s) inhaled every 6 hours  albuterol 90 mcg/inh inhalation aerosol: 2 puff(s) inhaled every 6 hours  Cipro 500 mg oral tablet: 1 tab(s) orally 2 times a day   Incruse Ellipta 62.5 mcg/inh inhalation powder: 1 puff(s) inhaled every 24 hours  lisinopril 2.5 mg oral tablet: 1 tab(s) orally once a day  OXcarbazepine 300 mg oral tablet: 1 tab(s) orally 3 times a day  predniSONE 2.5 mg oral tablet: 1 tab(s) orally once a day  pyridostigmine 60 mg oral tablet: 1 tab(s) orally every 4 hours   rolling walker: 1 unit(s)   Dx: R53.1 Weakness

## 2021-11-05 NOTE — PROGRESS NOTE ADULT - SUBJECTIVE AND OBJECTIVE BOX
Patient is a 76y old  Male who presents with a chief complaint of Fever    ----  INTERVAL HPI/OVERNIGHT EVENTS: Patient seen and evaluated at the bedside.     ----  PAST MEDICAL & SURGICAL HISTORY:  CAD (coronary artery disease)  Diabetes mellitus  COPD without exacerbation  Benign prostatic hyperplasia  GERD (gastroesophageal reflux disease)  Myasthenia gravis  Seizures    No significant past surgical history      FAMILY HISTORY:  FHx: stroke    Home Medications:  Advair Diskus 250 mcg-50 mcg inhalation powder: 1 puff(s) inhaled 2 times a day (18 Oct 2021 17:02)  albuterol 2.5 mg/3 mL (0.083%) inhalation solution: 3 milliliter(s) inhaled every 6 hours (18 Oct 2021 17:02)  albuterol 90 mcg/inh inhalation aerosol: 2 puff(s) inhaled every 6 hours (18 Oct 2021 17:02)  Crestor 10 mg oral tablet: 1 tab(s) orally once a day (18 Oct 2021 17:02)  Incruse Ellipta 62.5 mcg/inh inhalation powder: 1 puff(s) inhaled every 24 hours (18 Oct 2021 17:02)  lisinopril 2.5 mg oral tablet: 1 tab(s) orally once a day (18 Oct 2021 17:02)  OXcarbazepine 300 mg oral tablet: 1 tab(s) orally 3 times a day (18 Oct 2021 17:02)  predniSONE 2.5 mg oral tablet: 1 tab(s) orally once a day (18 Oct 2021 17:02)    Allergies  No Known Allergies    ----  MEDICATIONS  (STANDING):  budesonide 160 MICROgram(s)/formoterol 4.5 MICROgram(s) Inhaler 2 Puff(s) Inhalation two times a day  cefTRIAXone   IVPB      cefTRIAXone   IVPB 1000 milliGRAM(s) IV Intermittent every 24 hours  enoxaparin Injectable 40 milliGRAM(s) SubCutaneous daily  lisinopril 2.5 milliGRAM(s) Oral daily  OXcarbazepine 300 milliGRAM(s) Oral <User Schedule>  predniSONE   Tablet 2.5 milliGRAM(s) Oral daily  pyridostigmine 60 milliGRAM(s) Oral every 4 hours  saccharomyces boulardii 250 milliGRAM(s) Oral two times a day  sodium chloride 0.9%. 1000 milliLiter(s) (50 mL/Hr) IV Continuous <Continuous>    MEDICATIONS  (PRN):  acetaminophen     Tablet .. 650 milliGRAM(s) Oral every 6 hours PRN Temp greater or equal to 38C (100.4F), Mild Pain (1 - 3)  albuterol/ipratropium for Nebulization 3 milliLiter(s) Nebulizer every 6 hours PRN Shortness of Breath and/or Wheezing  aluminum hydroxide/magnesium hydroxide/simethicone Suspension 30 milliLiter(s) Oral every 4 hours PRN Dyspepsia  melatonin 3 milliGRAM(s) Oral at bedtime PRN Insomnia  ondansetron Injectable 4 milliGRAM(s) IV Push every 8 hours PRN Nausea and/or Vomiting  traMADol 25 milliGRAM(s) Oral every 6 hours PRN Moderate Pain (4 - 6)      ----  REVIEW OF SYSTEMS:  CONSTITUTIONAL: denies fever, chills, fatigue, weakness  HEENT: denies blurred vision, sore throat  SKIN: denies new lesions, rash  CARDIOVASCULAR: denies chest pain, chest pressure, palpitations  RESPIRATORY: denies shortness of breath, sputum production  GASTROINTESTINAL: denies nausea, vomiting, diarrhea, abdominal pain  GENITOURINARY: denies dysuria, discharge  NEUROLOGICAL: denies numbness, headache, focal weakness  MUSCULOSKELETAL: denies new joint pain, muscle aches  HEMATOLOGIC: denies gross bleeding, bruising  LYMPHATICS: denies enlarged lymph nodes, extremity swelling  PSYCHIATRIC: denies recent changes in anxiety, depression  ENDOCRINOLOGIC: denies sweating, cold or heat intolerance    ----  PHYSICAL EXAM:  GENERAL: patient appears well, no acute distress, appropriately interactive  EYES: sclera clear, no exudates  ENMT: oropharynx clear without erythema, moist mucous membranes  NECK: supple, soft, no thyromegaly noted  LUNGS: good air entry bilaterally, clear to auscultation, symmetric breath sounds, no wheezing or rhonchi appreciated  HEART: soft S1/S2, regular rate and rhythm, no murmurs noted, no noted edema to b/l LE  GASTROINTESTINAL: abdomen is soft, nontender, nondistended, normoactive bowel sounds, no palpable masses  INTEGUMENT: good skin turgor, appropriate for ethnicity, no visualized rashes, no jaundice noted  MUSCULOSKELETAL: no clubbing or cyanosis, no obvious deformity  NEUROLOGIC: awake, alert, oriented x3, good muscle tone in 4 extremities, no obvious sensory deficits  PSYCHIATRIC: mood is good, affect is congruent with mood, linear and logical thought process  HEME/LYMPH: no palpable supraclavicular nodules, no obvious ecchymosis     T(C): 37 (21 @ 04:55), Max: 38.8 (21 @ 16:59)  HR: 73 (21 @ 04:55) (70 - 89)  BP: 163/78 (21 @ 04:55) (117/73 - 163/78)  RR: 18 (21 @ 04:55) (17 - 18)  SpO2: 94% (21 @ 05:00) (88% - 95%)  Wt(kg): --    ----  I&O's Summary    2021 07:01  -  2021 07:00  --------------------------------------------------------  IN: 100 mL / OUT: 1800 mL / NET: -1700 mL        LABS:                        11.8   10.32 )-----------( 174      ( 2021 07:19 )             36.0     11-05    140  |  107  |  19  ----------------------------<  114<H>  3.8   |  27  |  0.86    Ca    8.5      2021 07:19  Phos  3.3     11-05  Mg     2.2     11-05    TPro  7.0  /  Alb  2.3<L>  /  TBili  0.6  /  DBili  x   /  AST  56<H>  /  ALT  69  /  AlkPhos  62  11-05    PT/INR - ( 2021 18:14 )   PT: 13.1 sec;   INR: 1.13 ratio        PTT - ( 2021 18:14 )  PTT:27.9 sec  Urinalysis Basic - ( 2021 19:21 )    Color: Yellow / Appearance: Clear / S.020 / pH: x  Gluc: x / Ketone: Negative  / Bili: Negative / Urobili: 4   Blood: x / Protein: 30 mg/dL / Nitrite: Negative   Leuk Esterase: Trace / RBC: 0-2 /HPF / WBC 3-5   Sq Epi: x / Non Sq Epi: Few / Bacteria: x    ----  Personally reviewed:  Vital sign trends: [  ] yes    [  ] no     [  ] n/a  Laboratory results: [  ] yes    [  ] no     [  ] n/a  Radiology results: [  ] yes    [  ] no     [  ] n/a  Culture results: [  ] yes    [  ] no     [  ] n/a  Consultant recommendations: [  ] yes    [  ] no     [  ] n/a 77 yo M with PMH BPH, GERD. DM, COPD, CAD, seizures, brain hemagioma s/p laser treatment in , and myasthenia gravis presents to the ED with diarrhea. Of note patient was hospitalized recently at [facility] 10/18-10/21 for MG exacerbation and received 3 days of IVIG. On day after admission, pt developed 1 bout of watery diarrhea which self-resolved, however daughter believes 2/2 decreased PO intake and appetite. He also reports 2 days after admission, he developed muscle pain and weakness located in quadriceps area, 10/10, at rest and with ambulation. Pt states muscle weakness has been so severe he hasn't been able to shave. ROS is positive for fever, abdominal pain, 1 episode of diarrhea (now resolved), decreased appetite, chronic cough, muscle weakness, muscle pain. ROS is negative for chills, n/v, SOB, cp, palpitations, changes in vision or hearing.    ----  INTERVAL HPI/OVERNIGHT EVENTS: Patient seen and evaluated at the bedside. Overnight, pt desat to 88, requested 2L NC. Pt complains of chills, fatigue, global joint/muscle pain especially L knee, cough. Pt denies fever, SOB, abdominal pain, dysuria, n/v/d/c.    ----  PAST MEDICAL & SURGICAL HISTORY:  CAD (coronary artery disease)  Diabetes mellitus  COPD without exacerbation  Benign prostatic hyperplasia  GERD (gastroesophageal reflux disease)  Myasthenia gravis  Seizures    No significant past surgical history      FAMILY HISTORY:  FHx: stroke    Home Medications:  Advair Diskus 250 mcg-50 mcg inhalation powder: 1 puff(s) inhaled 2 times a day (18 Oct 2021 17:02)  albuterol 2.5 mg/3 mL (0.083%) inhalation solution: 3 milliliter(s) inhaled every 6 hours (18 Oct 2021 17:02)  albuterol 90 mcg/inh inhalation aerosol: 2 puff(s) inhaled every 6 hours (18 Oct 2021 17:02)  Crestor 10 mg oral tablet: 1 tab(s) orally once a day (18 Oct 2021 17:02)  Incruse Ellipta 62.5 mcg/inh inhalation powder: 1 puff(s) inhaled every 24 hours (18 Oct 2021 17:02)  lisinopril 2.5 mg oral tablet: 1 tab(s) orally once a day (18 Oct 2021 17:02)  OXcarbazepine 300 mg oral tablet: 1 tab(s) orally 3 times a day (18 Oct 2021 17:02)  predniSONE 2.5 mg oral tablet: 1 tab(s) orally once a day (18 Oct 2021 17:02)    Allergies  No Known Allergies    ----  MEDICATIONS  (STANDING):  budesonide 160 MICROgram(s)/formoterol 4.5 MICROgram(s) Inhaler 2 Puff(s) Inhalation two times a day  cefTRIAXone   IVPB      cefTRIAXone   IVPB 1000 milliGRAM(s) IV Intermittent every 24 hours  enoxaparin Injectable 40 milliGRAM(s) SubCutaneous daily  lisinopril 2.5 milliGRAM(s) Oral daily  OXcarbazepine 300 milliGRAM(s) Oral <User Schedule>  predniSONE   Tablet 2.5 milliGRAM(s) Oral daily  pyridostigmine 60 milliGRAM(s) Oral every 4 hours  saccharomyces boulardii 250 milliGRAM(s) Oral two times a day  sodium chloride 0.9%. 1000 milliLiter(s) (50 mL/Hr) IV Continuous <Continuous>    MEDICATIONS  (PRN):  acetaminophen     Tablet .. 650 milliGRAM(s) Oral every 6 hours PRN Temp greater or equal to 38C (100.4F), Mild Pain (1 - 3)  albuterol/ipratropium for Nebulization 3 milliLiter(s) Nebulizer every 6 hours PRN Shortness of Breath and/or Wheezing  aluminum hydroxide/magnesium hydroxide/simethicone Suspension 30 milliLiter(s) Oral every 4 hours PRN Dyspepsia  melatonin 3 milliGRAM(s) Oral at bedtime PRN Insomnia  ondansetron Injectable 4 milliGRAM(s) IV Push every 8 hours PRN Nausea and/or Vomiting  traMADol 25 milliGRAM(s) Oral every 6 hours PRN Moderate Pain (4 - 6)      ----  REVIEW OF SYSTEMS:  CONSTITUTIONAL: +chills, +fatigue, +weakness, denies fever  HEENT: denies blurred vision  CARDIOVASCULAR: denies chest pain, palpitations  RESPIRATORY: +cough, denies shortness of breath  GASTROINTESTINAL: denies nausea, vomiting, diarrhea, abdominal pain  GENITOURINARY: denies dysuria  NEUROLOGICAL: denies headache  MUSCULOSKELETAL: +joint pain, +muscle aches    ----  PHYSICAL EXAM:  GENERAL: NAD, appropriately interactive  EYES: +ptosis on Left, sclera clear  ENMT: moist mucous membranes  NECK: supple  LUNGS: +rales b/l bases, symmetric breath sounds, no wheezing or rhonchi appreciated  HEART: S1/S2, regular rate and rhythm  GASTROINTESTINAL: abdomen is soft, nontender, nondistended, normoactive bowel sounds, no palpable masses  MUSCULOSKELETAL: +joints are warm, L>R  NEUROLOGIC: awake, alert, oriented x3      T(C): 37 (21 @ 04:55), Max: 38.8 (21 @ 16:59)  HR: 73 (21 @ 04:55) (70 - 89)  BP: 163/78 (21 @ 04:55) (117/73 - 163/78)  RR: 18 (21 @ 04:55) (17 - 18)  SpO2: 94% (21 @ 05:00) (88% - 95%)  Wt(kg): --    ----  I&O's Summary    2021 07:01  -  2021 07:00  --------------------------------------------------------  IN: 100 mL / OUT: 1800 mL / NET: -1700 mL        LABS:                        11.8   10.32 )-----------( 174      ( 2021 07:19 )             36.0     11-05    140  |  107  |  19  ----------------------------<  114<H>  3.8   |  27  |  0.86    Ca    8.5      2021 07:19  Phos  3.3     11-  Mg     2.2         TPro  7.0  /  Alb  2.3<L>  /  TBili  0.6  /  DBili  x   /  AST  56<H>  /  ALT  69  /  AlkPhos  62  11-05    PT/INR - ( 2021 18:14 )   PT: 13.1 sec;   INR: 1.13 ratio        PTT - ( 2021 18:14 )  PTT:27.9 sec  Urinalysis Basic - ( 2021 19:21 )    Color: Yellow / Appearance: Clear / S.020 / pH: x  Gluc: x / Ketone: Negative  / Bili: Negative / Urobili: 4   Blood: x / Protein: 30 mg/dL / Nitrite: Negative   Leuk Esterase: Trace / RBC: 0-2 /HPF / WBC 3-5   Sq Epi: x / Non Sq Epi: Few / Bacteria: x    ----  Personally reviewed:  < from: CT Chest w/ IV Cont (21 @ 19:56) >  EXAM:  CT ABDOMEN AND PELVIS IC                          EXAM:  CT CHEST IC                            PROCEDURE DATE:  2021          INTERPRETATION:  CLINICAL INFORMATION: Cough. Fever. Diarrhea. Body aches.    COMPARISON: CT chest from 2019.    CONTRAST/COMPLICATIONS:  IV Contrast: Omnipaque 350   90 cc administered   10 cc discarded  Oral Contrast: NONE  Complications: None reported at time of study completion    PROCEDURE:  CT of the Chest, Abdomen and Pelvis was performed.  Sagittal and coronal reformats were performed.    FINDINGS:  CHEST:  LUNGS AND LARGE AIRWAYS: Emphysema. Small pulmonary nodules, measuring up to 0.5 cm in the right middle lobe (series 2, image 41) and 0.5 cm in the lingula (series 2, 48).  PLEURA: Nopleural effusion.  VESSELS: Atherosclerotic changes of the aorta.  HEART: Heart size is normal. No pericardial effusion.  MEDIASTINUM AND ZACARIAS: No lymphadenopathy.  CHEST WALL AND LOWER NECK: Within normal limits.    ABDOMEN AND PELVIS:  LIVER: Within normal limits.  BILE DUCTS: Normal caliber.  GALLBLADDER: Within normal limits.  SPLEEN: Within normal limits.  PANCREAS: Within normal limits.  ADRENALS: Within normal limits.  KIDNEYS/URETERS: No hydronephrosis. Right lower pole renal cyst.    BLADDER: Within normal limits.  REPRODUCTIVE ORGANS: Prostate is enlarged.    BOWEL: No bowel obstruction. Appendix is normal. Colonic diverticulosis.  PERITONEUM: No ascites.  VESSELS: Atherosclerotic changes.  RETROPERITONEUM/LYMPH NODES: No lymphadenopathy.  ABDOMINAL WALL: Within normal limits.  BONES: Degenerative changes.    IMPRESSION:  No evidence of pneumonia.    No intra-abdominal pathology.    --- End of Report ---    < end of copied text >    Vital sign trends: [  ] yes     Laboratory results: [  ] yes      Radiology results: [  ] yes       75 yo M with PMH BPH, GERD. DM, COPD, CAD, seizures, brain hemagioma s/p laser treatment in , and myasthenia gravis presents to the ED with diarrhea. Of note patient was hospitalized recently at [facility] 10/18-10/21 for MG exacerbation and received 3 days of IVIG. On day after admission, pt developed 1 bout of watery diarrhea which self-resolved, however daughter believes 2/2 decreased PO intake and appetite. He also reports 2 days after admission, he developed muscle pain and weakness located in quadriceps area, 10/10, at rest and with ambulation. Pt states muscle weakness has been so severe he hasn't been able to shave. In ED, ROS is positive for fever, abdominal pain, 1 episode of diarrhea (now resolved), decreased appetite, chronic cough, muscle weakness, muscle pain. On exam, pt has global joint pain, proximal>distal, L>R, and joints are warm. Pt reports that something similar to this has happened before "a long time ago," was told it was arthritis, and the pain went away with tylenol.    ----  INTERVAL HPI/OVERNIGHT EVENTS: Patient seen and evaluated at the bedside. Overnight, pt desat to 88, requested 2L NC. Pt complains of chills, fatigue, global joint/muscle pain especially L knee, cough. Pt denies fever, SOB, abdominal pain, dysuria, n/v/d/c.    ----  PAST MEDICAL & SURGICAL HISTORY:  CAD (coronary artery disease)  Diabetes mellitus  COPD without exacerbation  Benign prostatic hyperplasia  GERD (gastroesophageal reflux disease)  Myasthenia gravis  Seizures    No significant past surgical history      FAMILY HISTORY:  FHx: stroke    Home Medications:  Advair Diskus 250 mcg-50 mcg inhalation powder: 1 puff(s) inhaled 2 times a day (18 Oct 2021 17:02)  albuterol 2.5 mg/3 mL (0.083%) inhalation solution: 3 milliliter(s) inhaled every 6 hours (18 Oct 2021 17:02)  albuterol 90 mcg/inh inhalation aerosol: 2 puff(s) inhaled every 6 hours (18 Oct 2021 17:02)  Crestor 10 mg oral tablet: 1 tab(s) orally once a day (18 Oct 2021 17:02)  Incruse Ellipta 62.5 mcg/inh inhalation powder: 1 puff(s) inhaled every 24 hours (18 Oct 2021 17:02)  lisinopril 2.5 mg oral tablet: 1 tab(s) orally once a day (18 Oct 2021 17:02)  OXcarbazepine 300 mg oral tablet: 1 tab(s) orally 3 times a day (18 Oct 2021 17:02)  predniSONE 2.5 mg oral tablet: 1 tab(s) orally once a day (18 Oct 2021 17:02)    Allergies  No Known Allergies    ----  MEDICATIONS  (STANDING):  budesonide 160 MICROgram(s)/formoterol 4.5 MICROgram(s) Inhaler 2 Puff(s) Inhalation two times a day  cefTRIAXone   IVPB      cefTRIAXone   IVPB 1000 milliGRAM(s) IV Intermittent every 24 hours  enoxaparin Injectable 40 milliGRAM(s) SubCutaneous daily  lisinopril 2.5 milliGRAM(s) Oral daily  OXcarbazepine 300 milliGRAM(s) Oral <User Schedule>  predniSONE   Tablet 2.5 milliGRAM(s) Oral daily  pyridostigmine 60 milliGRAM(s) Oral every 4 hours  saccharomyces boulardii 250 milliGRAM(s) Oral two times a day  sodium chloride 0.9%. 1000 milliLiter(s) (50 mL/Hr) IV Continuous <Continuous>    MEDICATIONS  (PRN):  acetaminophen     Tablet .. 650 milliGRAM(s) Oral every 6 hours PRN Temp greater or equal to 38C (100.4F), Mild Pain (1 - 3)  albuterol/ipratropium for Nebulization 3 milliLiter(s) Nebulizer every 6 hours PRN Shortness of Breath and/or Wheezing  aluminum hydroxide/magnesium hydroxide/simethicone Suspension 30 milliLiter(s) Oral every 4 hours PRN Dyspepsia  melatonin 3 milliGRAM(s) Oral at bedtime PRN Insomnia  ondansetron Injectable 4 milliGRAM(s) IV Push every 8 hours PRN Nausea and/or Vomiting  traMADol 25 milliGRAM(s) Oral every 6 hours PRN Moderate Pain (4 - 6)      ----  REVIEW OF SYSTEMS:  CONSTITUTIONAL: +chills, +fatigue, +weakness, denies fever  HEENT: denies blurred vision  CARDIOVASCULAR: denies chest pain, palpitations  RESPIRATORY: +cough, denies shortness of breath  GASTROINTESTINAL: denies nausea, vomiting, diarrhea, abdominal pain  GENITOURINARY: denies dysuria  NEUROLOGICAL: denies headache  MUSCULOSKELETAL: +joint pain, +muscle aches    ----  PHYSICAL EXAM:  GENERAL: NAD, appropriately interactive  EYES: +ptosis on Left, sclera clear  ENMT: moist mucous membranes  NECK: supple  LUNGS: +rales b/l bases, symmetric breath sounds, no wheezing or rhonchi appreciated  HEART: S1/S2, regular rate and rhythm  GASTROINTESTINAL: abdomen is soft, nontender, nondistended, normoactive bowel sounds, no palpable masses  MUSCULOSKELETAL: +joints are warm, L>R  NEUROLOGIC: awake, alert, oriented x3      T(C): 37 (21 @ 04:55), Max: 38.8 (21 @ 16:59)  HR: 73 (21 @ 04:55) (70 - 89)  BP: 163/78 (21 @ 04:55) (117/73 - 163/78)  RR: 18 (21 @ 04:55) (17 - 18)  SpO2: 94% (21 @ 05:00) (88% - 95%)  Wt(kg): --    ----  I&O's Summary    2021 07:01  -  2021 07:00  --------------------------------------------------------  IN: 100 mL / OUT: 1800 mL / NET: -1700 mL        LABS:                        11.8   10.32 )-----------( 174      ( 2021 07:19 )             36.0     05    140  |  107  |  19  ----------------------------<  114<H>  3.8   |  27  |  0.86    Ca    8.5      2021 07:19  Phos  3.3     11-05  Mg     2.2     -    TPro  7.0  /  Alb  2.3<L>  /  TBili  0.6  /  DBili  x   /  AST  56<H>  /  ALT  69  /  AlkPhos  62  11-05    PT/INR - ( 2021 18:14 )   PT: 13.1 sec;   INR: 1.13 ratio        PTT - ( 2021 18:14 )  PTT:27.9 sec  Urinalysis Basic - ( 2021 19:21 )    Color: Yellow / Appearance: Clear / S.020 / pH: x  Gluc: x / Ketone: Negative  / Bili: Negative / Urobili: 4   Blood: x / Protein: 30 mg/dL / Nitrite: Negative   Leuk Esterase: Trace / RBC: 0-2 /HPF / WBC 3-5   Sq Epi: x / Non Sq Epi: Few / Bacteria: x    ----  Personally reviewed:  < from: CT Chest w/ IV Cont (21 @ 19:56) >  EXAM:  CT ABDOMEN AND PELVIS IC                          EXAM:  CT CHEST IC                            PROCEDURE DATE:  2021          INTERPRETATION:  CLINICAL INFORMATION: Cough. Fever. Diarrhea. Body aches.    COMPARISON: CT chest from 2019.    CONTRAST/COMPLICATIONS:  IV Contrast: Omnipaque 350   90 cc administered   10 cc discarded  Oral Contrast: NONE  Complications: None reported at time of study completion    PROCEDURE:  CT of the Chest, Abdomen and Pelvis was performed.  Sagittal and coronal reformats were performed.    FINDINGS:  CHEST:  LUNGS AND LARGE AIRWAYS: Emphysema. Small pulmonary nodules, measuring up to 0.5 cm in the right middle lobe (series 2, image 41) and 0.5 cm in the lingula (series 2, 48).  PLEURA: Nopleural effusion.  VESSELS: Atherosclerotic changes of the aorta.  HEART: Heart size is normal. No pericardial effusion.  MEDIASTINUM AND ZACARIAS: No lymphadenopathy.  CHEST WALL AND LOWER NECK: Within normal limits.    ABDOMEN AND PELVIS:  LIVER: Within normal limits.  BILE DUCTS: Normal caliber.  GALLBLADDER: Within normal limits.  SPLEEN: Within normal limits.  PANCREAS: Within normal limits.  ADRENALS: Within normal limits.  KIDNEYS/URETERS: No hydronephrosis. Right lower pole renal cyst.    BLADDER: Within normal limits.  REPRODUCTIVE ORGANS: Prostate is enlarged.    BOWEL: No bowel obstruction. Appendix is normal. Colonic diverticulosis.  PERITONEUM: No ascites.  VESSELS: Atherosclerotic changes.  RETROPERITONEUM/LYMPH NODES: No lymphadenopathy.  ABDOMINAL WALL: Within normal limits.  BONES: Degenerative changes.    IMPRESSION:  No evidence of pneumonia.    No intra-abdominal pathology.    --- End of Report ---    < end of copied text >    Vital sign trends: [  ] yes     Laboratory results: [  ] yes      Radiology results: [  ] yes

## 2021-11-05 NOTE — DISCHARGE NOTE PROVIDER - PROVIDER TOKENS
PROVIDER:[TOKEN:[[id number]],FOLLOWUP:[1 week]],PROVIDER:[TOKEN:[[id number]],FOLLOWUP:[1 week]]

## 2021-11-05 NOTE — SWALLOW BEDSIDE ASSESSMENT ADULT - COMMENTS
Consult received and chart reviewed. Attempted clinical swallow assessment this PM. Upon arrival, pt off the unit at ultrasound. Will f/u as schedule permits.
Pt received upright in bed, awake and cooperative, on supplemental O2 via 2L NC. Pt was agreeable to assessment. Pt followed simple commands independently. Pt's vocal quality/intensity and speech production was WFL. Pt denied h/o dysphagia and was reportedly tolerating a regular diet with thin liquids PTA. Pt denied pain pre/post assessment.    CT chest 11/4: "No evidence of pneumonia" Pt's WBC is WFL, no fever.

## 2021-11-05 NOTE — DISCHARGE NOTE PROVIDER - CARE PROVIDER_API CALL
Gena Bonilla  NEUROLOGY  [address] [place] [address]  Phone: [phone]  Fax: [phone]  Follow Up Time: 1 week    Priya Phillip)  Infectious Disease; Internal Medicine  [address] [place] [id number]  Phone: [phone]  Fax: [phone]  Follow Up Time: 1 week

## 2021-11-05 NOTE — DISCHARGE NOTE PROVIDER - NSDCCPCAREPLAN_GEN_ALL_CORE_FT
PRINCIPAL DISCHARGE DIAGNOSIS  Diagnosis: Bacteremia  Assessment and Plan of Treatment: You were admitted to the hospital for infectious workup due to fever, diarrhea. You were found to have a bacteria in your bloodstream, salmonella, and were seen by an infectious disease doctor and treated with IV antibiotics. Repeat blood cultures were negative and you were transitioned to oral antibiotics.   Upon discharge, please CONTINUE:  -Ciprofloxacin 500mg twice per day, last day would be 11/19, start this medication on ****  -If you notice any fevers, chills, worsening diarrhea or SOB please return to the Emergency department  -Follow up with your PCP and an infectious disease specialist within 1 week, you or family members responsible for making all appointments      SECONDARY DISCHARGE DIAGNOSES  Diagnosis: Myalgia  Assessment and Plan of Treatment: You were experiencing diffuse joint pain on admission to the hospital. Xray of your R knee showed degenerative changes. Inflammatory markers were elevated and this was likely due to your myasthenia gravis. You were treated with oral pain medications and your pain was stablized. Follow up with your primary care doctor within 1 week of discharge from the hospital.    Diagnosis: Transaminitis  Assessment and Plan of Treatment: During your hospital course your liver function became elevated. Ultrasound of your abdomen showed no acute findings. This elevation is likely multifactorial due to the infection in your blood and medications. Your liver function stablized. It is important you follow up with your primary care doctor within 1 week for repeat CMP to evaluate your liver function.    Diagnosis: Myasthenia gravis  Assessment and Plan of Treatment: You have a history of myasthenia gravis and were seen by a neurologist. Please continue your current medication regimen and follow up with your primary care doctor within 1 week of discharge from the hospital.     PRINCIPAL DISCHARGE DIAGNOSIS  Diagnosis: Bacteremia  Assessment and Plan of Treatment: You were admitted to the hospital for infectious workup due to fever, diarrhea. You were found to have a bacteria in your bloodstream, salmonella, and were seen by an infectious disease doctor and treated with IV antibiotics. Repeat blood cultures were negative and you were transitioned to oral antibiotics.   Upon discharge, please CONTINUE:  -Ciprofloxacin 500mg twice per day, last day would be 11/19, start this medication tomorrow, 11/10 in the AM  -If you notice any fevers, chills, worsening diarrhea or SOB please return to the Emergency department  -Follow up with your PCP and an infectious disease specialist within 1 week, you or family members responsible for making all appointments      SECONDARY DISCHARGE DIAGNOSES  Diagnosis: Myalgia  Assessment and Plan of Treatment: You were experiencing diffuse joint pain on admission to the hospital. Xray of your R knee showed degenerative changes. Inflammatory markers were elevated and this was likely due to your myasthenia gravis and infection. Your statin medication was stopped as this can contribute to joint pain. Follow up with your primary care doctor within 1 week of discharge from the hospital. It is necessary to follow up and determine if you should continue your statin (Crestor) medication.    Diagnosis: Transaminitis  Assessment and Plan of Treatment: During your hospital course your liver function became elevated. Ultrasound of your abdomen showed no acute findings. This elevation is likely multifactorial due to the infection in your blood and medications. Your liver function downtrended but is still slightly elevated. It is important you follow up with your primary care doctor within 1 week for repeat CMP to evaluate your liver function. Also, please STOP TAKING your statin, Crestor, as this can elevate your liver function. Follow up with your primary care doctor within 1 week of discharge from the hospital. It is necessary to follow up and determine if you should continue your statin (Crestor) medication.    Diagnosis: Seizures  Assessment and Plan of Treatment: Continue your oxcarbazepine three times daily upon discharge.    Diagnosis: Myasthenia gravis  Assessment and Plan of Treatment: You have a history of myasthenia gravis and were seen by a neurologist. Please continue your current medication regimen, pyridostigmine and prednisone, and follow up with your neurologist Dr. Bonilla within 1 week of discharge from the hospital.    Diagnosis: COPD without exacerbation  Assessment and Plan of Treatment: You have a history of COPD. Please continue taking your albuterol as scheduled every 6 hours upon discharge. Follow up with your primary are doctor within 1 week of discharge from the hospital.    Diagnosis: Hyponatremia  Assessment and Plan of Treatment: You have a history of low sodium levels and were previously prescribed salt tablets. Your sodium levels remained stable while hospitalized WITHOUT the salt tablets. Please STOP TAKING your salt tablets once discharged and follow up with your primary care doctor within 1 week of discharge from the hospital for a repeat CMP.    Diagnosis: Pulmonary nodule  Assessment and Plan of Treatment: On imaging of your chest a lung nodule was seen. It is very important you follow up with your primary care doctor and pulmonologist for a repeat CT scan of your lungs in 6 months. You are responsible for making all follow up appointments regarding this.

## 2021-11-05 NOTE — SWALLOW BEDSIDE ASSESSMENT ADULT - SWALLOW EVAL: DIAGNOSIS
Pt self-administered PO trials of thin liquids and regular solids. Pt p/w a functional oral phase marked by adequate retrieval and containment, timely mastication/manipulation and transfer, and adequate clearance post swallow. Pharyngeal dysphagia marked by suspected timely swallow onset, +laryngeal elevation to palpation. Pt p/w cough response x1 upon consecutive cup sips of thin liquids, not reduplicated upon additional trials or with single/small cup sips. Pt reported baseline chronic cough. Pt did not demonstrate overt s/sx of aspiration with regular solids. Recommend pt resume regular solids with thin liquids via single/small sips +aspiration precautions.

## 2021-11-05 NOTE — CHART NOTE - NSCHARTNOTEFT_GEN_A_CORE
Called by RN for patient with oxygen saturation of 88% on room air. Patient states he uses oxygen at night-time at home for his COPD and would prefer to have 2L of O2 by NC at this time.    - OK to use Oxygen by NC at night  - prefer room air during the day time as long as o2 sat remains 88% or greater.

## 2021-11-05 NOTE — CONSULT NOTE ADULT - SUBJECTIVE AND OBJECTIVE BOX
[facility]  INFECTIOUS DISEASES   [address] [place] [address]  Tel: [phone]     Fax: [phone]  ======================================================  MD Briseida Ray Kaushal, MD Cho, Michelle, MD   ======================================================    N-[id number]  JACKIE LÓPEZ     CC: Patient is a 76y old  Male who presents with a chief complaint of Fever (2021 10:43)    HPI:  75 yo M with PMH BPH, GERD. DM, COPD, CAD, seizures, brain hemagioma s/p laser treatment in , and myasthenia gravis presents to the ED with diarrhea. Of note patient was [facility] for MG exacerbation and received 3 days of IVIG. On day after admission, pt developed 1 bout of watery diarrhea which self-resolved, however daughter believes 2/2 decreased PO intake and appetite. He also reports 2 days after admission, he developed muscle pain and weakness located in quadriceps area, 10/10, at rest and with ambulation. Pt states muscle weakness has been so severe he hasn't been able to shave. ROS is positive for fever, abdominal pain, 1 episode of diarrhea (now resolved), decreased appetite, chronic cough, muscle weakness, muscle pain. ROS is negative for chills, n/v, SOB, cp, palpitations, changes in vision or hearing.    In the ED  VS: T101.9 rectal, HR 89, /73, RR 18 SPO2 88% on RA  Labs: WBC 12.85, HH 12.7/38.4, PLTS 183, Na 137, K 4.1, Cr 0.97, Gluc 143, albumin 2.6, lactate 2.3, hs troponin negative x1,   Chest Xray: no acute lung pathology (personal read)  CT chest: pending  CT A/P: pending  EKG: Normal Sinus Rhythm Normal ECG  Given in ED: acetaminophen 650mg PO x1, zosyn, vancomycin 1gm, NS 1.850 L bolus x1 (2021 18:53)      PAST MEDICAL & SURGICAL HISTORY:  CAD (coronary artery disease)    Diabetes mellitus    COPD without exacerbation    Benign prostatic hyperplasia    GERD (gastroesophageal reflux disease)    Myasthenia gravis    Seizures    No significant past surgical history        Social Hx:     FAMILY HISTORY:  FHx: stroke        Allergies    No Known Allergies    Intolerances        Antibiotics:  MEDICATIONS  (STANDING):  budesonide 160 MICROgram(s)/formoterol 4.5 MICROgram(s) Inhaler 2 Puff(s) Inhalation two times a day  enoxaparin Injectable 40 milliGRAM(s) SubCutaneous daily  lisinopril 2.5 milliGRAM(s) Oral daily  OXcarbazepine 300 milliGRAM(s) Oral <User Schedule>  predniSONE   Tablet 2.5 milliGRAM(s) Oral daily  pyridostigmine 60 milliGRAM(s) Oral every 4 hours  saccharomyces boulardii 250 milliGRAM(s) Oral two times a day  sodium chloride 0.9%. 1000 milliLiter(s) (50 mL/Hr) IV Continuous <Continuous>    MEDICATIONS  (PRN):  acetaminophen     Tablet .. 650 milliGRAM(s) Oral every 6 hours PRN Temp greater or equal to 38C (100.4F), Mild Pain (1 - 3)  albuterol/ipratropium for Nebulization 3 milliLiter(s) Nebulizer every 6 hours PRN Shortness of Breath and/or Wheezing  aluminum hydroxide/magnesium hydroxide/simethicone Suspension 30 milliLiter(s) Oral every 4 hours PRN Dyspepsia  melatonin 3 milliGRAM(s) Oral at bedtime PRN Insomnia  ondansetron Injectable 4 milliGRAM(s) IV Push every 8 hours PRN Nausea and/or Vomiting  traMADol 25 milliGRAM(s) Oral every 6 hours PRN Moderate Pain (4 - 6)       REVIEW OF SYSTEMS:  CONSTITUTIONAL:  No Fever or chills  HEENT:  No diplopia or blurred vision.  No sore throat or runny nose.  CARDIOVASCULAR:  No chest pain or SOB.  RESPIRATORY:  No cough, shortness of breath, PND or orthopnea.  GASTROINTESTINAL:  No nausea, vomiting or diarrhea.  GENITOURINARY:  No dysuria, frequency or urgency. No Blood in urine  MUSCULOSKELETAL:  no joint aches, no muscle pain  SKIN:  No change in skin, hair or nails.  NEUROLOGIC:  No paresthesias, fasciculations, seizures or weakness.  PSYCHIATRIC:  No disorder of thought or mood.  ENDOCRINE:  No heat or cold intolerance, polyuria or polydipsia.  HEMATOLOGICAL:  No easy bruising or bleeding.     Physical Exam:  Vital Signs Last 24 Hrs  T(C): 37 (2021 04:55), Max: 38.8 (2021 16:59)  T(F): 98.6 (2021 04:55), Max: 101.9 (2021 16:59)  HR: 73 (2021 04:55) (70 - 89)  BP: 163/78 (2021 04:55) (117/73 - 163/78)  BP(mean): --  RR: 18 (2021 04:55) (17 - 18)  SpO2: 94% (2021 05:00) (88% - 95%)  Height (cm): 162.6 ( @ 16:26)  Weight (kg): 86.2 ( @ 16:26)  BMI (kg/m2): 32.6 ( @ 16:26)  BSA (m2): 1.91 ( @ 16:26)  GEN: NAD  HEENT: normocephalic and atraumatic. EOMI. PERRL.    NECK: Supple.  No lymphadenopathy   LUNGS: Clear to auscultation.  HEART: Regular rate and rhythm without murmur.  ABDOMEN: Soft, nontender, and nondistended.  Positive bowel sounds.    : No CVA tenderness  EXTREMITIES: Without any cyanosis, clubbing, rash, lesions or edema.  NEUROLOGIC: grossly intact.  PSYCHIATRIC: Appropriate affect .  SKIN: No ulceration or induration present.    Labs:      140  |  107  |  19  ----------------------------<  114<H>  3.8   |  27  |  0.86    Ca    8.5      2021 07:19  Phos  3.3     11-  Mg     2.2     -    TPro  7.0  /  Alb  2.3<L>  /  TBili  0.6  /  DBili  x   /  AST  56<H>  /  ALT  69  /  AlkPhos  62  -                          11.8   10.32 )-----------( 174      ( 2021 07:19 )             36.0     PT/INR - ( 2021 18:14 )   PT: 13.1 sec;   INR: 1.13 ratio         PTT - ( 2021 18:14 )  PTT:27.9 sec  Urinalysis Basic - ( 2021 19:21 )    Color: Yellow / Appearance: Clear / S.020 / pH: x  Gluc: x / Ketone: Negative  / Bili: Negative / Urobili: 4   Blood: x / Protein: 30 mg/dL / Nitrite: Negative   Leuk Esterase: Trace / RBC: 0-2 /HPF / WBC 3-5   Sq Epi: x / Non Sq Epi: Few / Bacteria: x      LIVER FUNCTIONS - ( 2021 07:19 )  Alb: 2.3 g/dL / Pro: 7.0 g/dL / ALK PHOS: 62 U/L / ALT: 69 U/L / AST: 56 U/L / GGT: x           CARDIAC MARKERS ( 2021 21:23 )  x     / x     / 48 U/L / x     / x            Procalcitonin, Serum: 0.05 ng/mL (21 @ 07:19)        SARS-CoV-2: NotDetec (21 @ 18:14)  Rapid RVP Result: NotDetec (21 @ 18:14)    COVID-19 PCR: NotDetec (10-18-21 @ 14:15)      RECENT CULTURES:   @ 18:14          NotDetec        All imaging and other data have been reviewed.      Assessment and Plan:       Thank you for courtesy of this consult.     Will follow.  Discussed with the primary team.     Priya Phillip MD  Division of Infectious Diseases   Cell [phone] between 8am and 6pm   After 6pm and weekends please call ID service at [phone].  [facility]  INFECTIOUS DISEASES   [address] [place] [address]  Tel: [phone]     Fax: [phone]  ======================================================  MD Briseida Ray Kaushal, MD Cho, Michelle, MD   ======================================================    MRN-[id number]  JACKIE LÓPEZ     CC:  Fever     HPI:  77 yo man with PMH of DM2, CAD, BPH, COPD, Myasthenia gravis and seizures with brain hemangioma s/p laser treatment in , was admitted with fever. He was admitted at [facility] 10/18-10/21 for MG exacerbation and received 3 days of IVIG. On day after admission, he had one bout of watery diarrhea which self-resolved. No cough, chest pain, nausea/vomiting, diarrhea (once had that resolved), dysuria. He is complaining of pain in left foot mostly in toes and also calf area. He had muscle pain and weakness located in quadriceps area in last admission as well.   ROS is positive for fever, abdominal pain, 1 episode of diarrhea (now resolved), decreased appetite, chronic cough, muscle weakness, muscle pain.    PAST MEDICAL & SURGICAL HISTORY:  CAD (coronary artery disease)  Diabetes mellitus  COPD without exacerbation  Benign prostatic hyperplasia  GERD (gastroesophageal reflux disease)  Myasthenia gravis  Seizures  No significant past surgical history    Social Hx: no smoking, ETOH or drugs    FAMILY HISTORY:  FHx: stroke    Allergies  No Known Allergies    Antibiotics:  none     REVIEW OF SYSTEMS:  CONSTITUTIONAL:  + Fever, no chills  HEENT:  No diplopia or blurred vision.  No sore throat or runny nose.  CARDIOVASCULAR:  No chest pain or SOB.  RESPIRATORY:  No cough, shortness of breath, PND or orthopnea.  GASTROINTESTINAL:  No nausea, vomiting or diarrhea.  GENITOURINARY:  No dysuria, frequency or urgency. No Blood in urine  MUSCULOSKELETAL: pain in left leg and foot  SKIN:  No change in skin, hair or nails.  NEUROLOGIC:  No paresthesias, fasciculations, seizures or weakness.  PSYCHIATRIC:  No disorder of thought or mood.  ENDOCRINE:  No heat or cold intolerance, polyuria or polydipsia.  HEMATOLOGICAL:  No easy bruising or bleeding.     Physical Exam:  Vital Signs Last 24 Hrs  T(C): 37 (2021 04:55), Max: 38.8 (2021 16:59)  T(F): 98.6 (2021 04:55), Max: 101.9 (2021 16:59)  HR: 73 (2021 04:55) (70 - 89)  BP: 163/78 (2021 04:55) (117/73 - 163/78)  RR: 18 (2021 04:55) (17 - 18)  SpO2: 94% (2021 05:00) (88% - 95%)  Height (cm): 162.6 ( @ 16:26)  Weight (kg): 86.2 ( @ 16:26)  BMI (kg/m2): 32.6 ( @ 16:26)  BSA (m2): 1.91 ( @ 16:26)  GEN: NAD  HEENT: normocephalic and atraumatic. EOMI. PERRL.    NECK: Supple.  No lymphadenopathy   LUNGS: Clear to auscultation.  HEART: Regular rate and rhythm without murmur.  ABDOMEN: Soft, nontender, and nondistended.  Positive bowel sounds.    : No CVA tenderness  EXTREMITIES: Without any cyanosis, clubbing, rash, lesions or edema.  NEUROLOGIC: grossly intact.  PSYCHIATRIC: Appropriate affect .  SKIN: No ulceration or induration present.    Labs:      140  |  107  |  19  ----------------------------<  114<H>  3.8   |  27  |  0.86    Ca    8.5      2021 07:19  Phos  3.3       Mg     2.2         TPro  7.0  /  Alb  2.3<L>  /  TBili  0.6  /  DBili  x   /  AST  56<H>  /  ALT  69  /  AlkPhos  62  11-05                          11.8   10.32 )-----------( 174      ( 2021 07:19 )             36.0     PT/INR - ( 2021 18:14 )   PT: 13.1 sec;   INR: 1.13 ratio    PTT - ( 2021 18:14 )  PTT:27.9 sec  Urinalysis Basic - ( 2021 19:21 )    Color: Yellow / Appearance: Clear / S.020 / pH: x  Gluc: x / Ketone: Negative  / Bili: Negative / Urobili: 4   Blood: x / Protein: 30 mg/dL / Nitrite: Negative   Leuk Esterase: Trace / RBC: 0-2 /HPF / WBC 3-5   Sq Epi: x / Non Sq Epi: Few / Bacteria: x    LIVER FUNCTIONS - ( 2021 07:19 )  Alb: 2.3 g/dL / Pro: 7.0 g/dL / ALK PHOS: 62 U/L / ALT: 69 U/L / AST: 56 U/L / GGT: x           CARDIAC MARKERS ( 2021 21:23 )  x     / x     / 48 U/L / x     / x        Procalcitonin, Serum: 0.05 ng/mL (21 @ 07:19)    SARS-CoV-2: NotDetec (21 @ 18:14)  Rapid RVP Result: NotDetec (21 @ 18:14)    COVID-19 PCR: NotDetec (10-18-21 @ 14:15)    RECENT CULTURES:   @ 18:14      NotDetec    All imaging and other data have been reviewed.  < from: CT Chest w/ IV Cont (21 @ 19:56) >  IMPRESSION:  No evidence of pneumonia.  No intra-abdominal pathology.    Assessment and Plan:   77 yo man with PMH of DM2, CAD, BPH, COPD, Myasthenia gravis and seizures with brain hemangioma s/p laser treatment in , was admitted with fever. He was admitted at [facility] 10/18-10/21 for MG exacerbation and received 3 days of IVIG. On day after admission, he had one bout of watery diarrhea which self-resolved. The only complaint is pain left calf and foot. Since was admitted and was less active will rule out DVTs. No other source of infection at this time, CT chest and abdomen are negative. IVIG can cause fever but usually not after 2 weeks.     1- Fever  - Blood culture x 2   - UA negative   - Abdominal CT and Chest CT negative  - If diarrhea please send for c diff and GI PCR, currently resolved  - Left calf and foot pain, will get Doppler to rule out DVT   - Watch off antibiotics  - If fever continues or any hemodynamic instability will start ABx empirically     Thank you for courtesy of this consult.     Will follow.  Discussed with the primary team.     Priya Phillip MD  Division of Infectious Diseases   Cell [phone] between 8am and 6pm   After 6pm and weekends please call ID service at [phone].  [facility]  INFECTIOUS DISEASES   [address] [place] [address]  Tel: [phone]     Fax: [phone]  ======================================================  MD Briseida Ray Kaushal, MD Cho, Michelle, MD   ======================================================    MRN-[id number]  JACKIE LÓPEZ     CC:  Fever     HPI:  75 yo man with PMH of DM2, CAD, BPH, COPD, Myasthenia gravis and seizures with brain hemangioma s/p laser treatment in , was admitted with fever. He was admitted at [facility] 10/18-10/21 for MG exacerbation and received 3 days of IVIG. On day after admission, he had one bout of watery diarrhea which self-resolved. No cough, chest pain, nausea/vomiting, diarrhea (once had that resolved), dysuria. He is complaining of pain in left foot mostly in toes and also calf area. He had muscle pain and weakness located in quadriceps area in last admission as well.   ROS is positive for fever, abdominal pain, 1 episode of diarrhea (now resolved), decreased appetite, chronic cough, muscle weakness, muscle pain.    PAST MEDICAL & SURGICAL HISTORY:  CAD (coronary artery disease)  Diabetes mellitus  COPD without exacerbation  Benign prostatic hyperplasia  GERD (gastroesophageal reflux disease)  Myasthenia gravis  Seizures  No significant past surgical history    Social Hx: no smoking, ETOH or drugs    FAMILY HISTORY:  FHx: stroke    Allergies  No Known Allergies    Antibiotics:  none     REVIEW OF SYSTEMS:  CONSTITUTIONAL:  + Fever, no chills  HEENT:  No diplopia or blurred vision.  No sore throat or runny nose.  CARDIOVASCULAR:  No chest pain or SOB.  RESPIRATORY:  No cough, shortness of breath, PND or orthopnea.  GASTROINTESTINAL:  No nausea, vomiting or diarrhea.  GENITOURINARY:  No dysuria, frequency or urgency. No Blood in urine  MUSCULOSKELETAL: pain in left leg and foot  SKIN:  No change in skin, hair or nails.  NEUROLOGIC:  No paresthesias, fasciculations, seizures or weakness.  PSYCHIATRIC:  No disorder of thought or mood.  ENDOCRINE:  No heat or cold intolerance, polyuria or polydipsia.  HEMATOLOGICAL:  No easy bruising or bleeding.     Physical Exam:  Vital Signs Last 24 Hrs  T(C): 37 (2021 04:55), Max: 38.8 (2021 16:59)  T(F): 98.6 (2021 04:55), Max: 101.9 (2021 16:59)  HR: 73 (2021 04:55) (70 - 89)  BP: 163/78 (2021 04:55) (117/73 - 163/78)  RR: 18 (2021 04:55) (17 - 18)  SpO2: 94% (2021 05:00) (88% - 95%)  Height (cm): 162.6 ( @ 16:26)  Weight (kg): 86.2 ( @ 16:26)  BMI (kg/m2): 32.6 ( @ 16:26)  BSA (m2): 1.91 ( @ 16:26)  GEN: NAD  HEENT: normocephalic and atraumatic. EOMI. PERRL.    NECK: Supple.  No lymphadenopathy   LUNGS: Clear to auscultation.  HEART: Regular rate and rhythm without murmur.  ABDOMEN: Soft, nontender, and nondistended.  Positive bowel sounds.    : No CVA tenderness  EXTREMITIES: Without any cyanosis, clubbing, rash, lesions or edema.  NEUROLOGIC: grossly intact.  PSYCHIATRIC: Appropriate affect .  SKIN: No ulceration or induration present.    Labs:      140  |  107  |  19  ----------------------------<  114<H>  3.8   |  27  |  0.86    Ca    8.5      2021 07:19  Phos  3.3       Mg     2.2         TPro  7.0  /  Alb  2.3<L>  /  TBili  0.6  /  DBili  x   /  AST  56<H>  /  ALT  69  /  AlkPhos  62  11-05                          11.8   10.32 )-----------( 174      ( 2021 07:19 )             36.0     PT/INR - ( 2021 18:14 )   PT: 13.1 sec;   INR: 1.13 ratio    PTT - ( 2021 18:14 )  PTT:27.9 sec  Urinalysis Basic - ( 2021 19:21 )    Color: Yellow / Appearance: Clear / S.020 / pH: x  Gluc: x / Ketone: Negative  / Bili: Negative / Urobili: 4   Blood: x / Protein: 30 mg/dL / Nitrite: Negative   Leuk Esterase: Trace / RBC: 0-2 /HPF / WBC 3-5   Sq Epi: x / Non Sq Epi: Few / Bacteria: x    LIVER FUNCTIONS - ( 2021 07:19 )  Alb: 2.3 g/dL / Pro: 7.0 g/dL / ALK PHOS: 62 U/L / ALT: 69 U/L / AST: 56 U/L / GGT: x           CARDIAC MARKERS ( 2021 21:23 )  x     / x     / 48 U/L / x     / x        Procalcitonin, Serum: 0.05 ng/mL (21 @ 07:19)    SARS-CoV-2: NotDetec (21 @ 18:14)  Rapid RVP Result: NotDetec (21 @ 18:14)    COVID-19 PCR: NotDetec (10-18-21 @ 14:15)    RECENT CULTURES:   @ 18:14      NotDetec    All imaging and other data have been reviewed.  < from: CT Chest w/ IV Cont (21 @ 19:56) >  IMPRESSION:  No evidence of pneumonia.  No intra-abdominal pathology.    Assessment and Plan:   75 yo man with PMH of DM2, CAD, BPH, COPD, Myasthenia gravis and seizures with brain hemangioma s/p laser treatment in , was admitted with fever. He was admitted at [facility] 10/18-10/21 for MG exacerbation and received 3 days of IVIG. On day after admission, he had one bout of watery diarrhea which self-resolved. The only complaint is pain left calf and foot. Since was admitted and was less active will rule out DVTs. No other source of infection at this time, CT chest and abdomen are negative. IVIG can cause fever but usually not after 2 weeks.     1- Fever  - Blood culture x 2   - UA negative   - Abdominal CT and Chest CT negative  - If diarrhea please send for c diff and GI PCR, currently resolved  - Left calf and foot pain, will get Doppler to rule out DVT   - Watch off antibiotics  - If fever continues or any hemodynamic instability will start ABx empirically     Thank you for courtesy of this consult.     Will follow.  Discussed with the primary team.   Dr. Kelly is covering this weekend.     Priya Phillip MD  Division of Infectious Diseases   Cell [phone] between 8am and 6pm   After 6pm and weekends please call ID service at [phone].  [facility]  INFECTIOUS DISEASES   [address] [place] [address]  Tel: [phone]     Fax: [phone]  ======================================================  MD Briseida Ray Kaushal, MD Cho, Michelle, MD   ======================================================    MRN-[id number]  JACKIE LÓPEZ     CC:  Fever     HPI:  77 yo man with PMH of DM2, CAD, BPH, COPD, Myasthenia gravis and seizures with brain hemangioma s/p laser treatment in , was admitted with fever. He was admitted at [facility] 10/18-10/21 for MG exacerbation and received 3 days of IVIG. On day after admission, he had one bout of watery diarrhea which self-resolved. No cough, chest pain, nausea/vomiting, diarrhea (once had that resolved), dysuria. He is complaining of pain in left foot mostly in toes and also calf area. He had muscle pain and weakness located in quadriceps area in last admission as well.   ROS is positive for fever, abdominal pain, 1 episode of diarrhea (now resolved), decreased appetite, chronic cough, muscle weakness, muscle pain.    PAST MEDICAL & SURGICAL HISTORY:  CAD (coronary artery disease)  Diabetes mellitus  COPD without exacerbation  Benign prostatic hyperplasia  GERD (gastroesophageal reflux disease)  Myasthenia gravis  Seizures  No significant past surgical history    Social Hx: no smoking, ETOH or drugs    FAMILY HISTORY:  FHx: stroke    Allergies  No Known Allergies    Antibiotics:  none     REVIEW OF SYSTEMS:  CONSTITUTIONAL:  + Fever, no chills  HEENT:  No diplopia or blurred vision.  No sore throat or runny nose.  CARDIOVASCULAR:  No chest pain or SOB.  RESPIRATORY:  No cough, shortness of breath, PND or orthopnea.  GASTROINTESTINAL:  No nausea, vomiting or diarrhea.  GENITOURINARY:  No dysuria, frequency or urgency. No Blood in urine  MUSCULOSKELETAL: pain in left leg and foot  SKIN:  No change in skin, hair or nails.  NEUROLOGIC:  No paresthesias, fasciculations, seizures or weakness.  PSYCHIATRIC:  No disorder of thought or mood.  ENDOCRINE:  No heat or cold intolerance, polyuria or polydipsia.  HEMATOLOGICAL:  No easy bruising or bleeding.     Physical Exam:  Vital Signs Last 24 Hrs  T(C): 37 (2021 04:55), Max: 38.8 (2021 16:59)  T(F): 98.6 (2021 04:55), Max: 101.9 (2021 16:59)  HR: 73 (2021 04:55) (70 - 89)  BP: 163/78 (2021 04:55) (117/73 - 163/78)  RR: 18 (2021 04:55) (17 - 18)  SpO2: 94% (2021 05:00) (88% - 95%)  Height (cm): 162.6 ( @ 16:26)  Weight (kg): 86.2 ( @ 16:26)  BMI (kg/m2): 32.6 ( @ 16:26)  BSA (m2): 1.91 ( @ 16:26)  GEN: NAD  HEENT: normocephalic and atraumatic. EOMI. PERRL.    NECK: Supple.  No lymphadenopathy   LUNGS: Clear to auscultation.  HEART: Regular rate and rhythm without murmur.  ABDOMEN: Soft, nontender, and nondistended.  Positive bowel sounds.    : No CVA tenderness  EXTREMITIES: Without any cyanosis, clubbing, rash, lesions or edema.  NEUROLOGIC: grossly intact.  PSYCHIATRIC: Appropriate affect .  SKIN: No ulceration or induration present.    Labs:      140  |  107  |  19  ----------------------------<  114<H>  3.8   |  27  |  0.86    Ca    8.5      2021 07:19  Phos  3.3       Mg     2.2         TPro  7.0  /  Alb  2.3<L>  /  TBili  0.6  /  DBili  x   /  AST  56<H>  /  ALT  69  /  AlkPhos  62  11-05                          11.8   10.32 )-----------( 174      ( 2021 07:19 )             36.0     PT/INR - ( 2021 18:14 )   PT: 13.1 sec;   INR: 1.13 ratio    PTT - ( 2021 18:14 )  PTT:27.9 sec  Urinalysis Basic - ( 2021 19:21 )    Color: Yellow / Appearance: Clear / S.020 / pH: x  Gluc: x / Ketone: Negative  / Bili: Negative / Urobili: 4   Blood: x / Protein: 30 mg/dL / Nitrite: Negative   Leuk Esterase: Trace / RBC: 0-2 /HPF / WBC 3-5   Sq Epi: x / Non Sq Epi: Few / Bacteria: x    LIVER FUNCTIONS - ( 2021 07:19 )  Alb: 2.3 g/dL / Pro: 7.0 g/dL / ALK PHOS: 62 U/L / ALT: 69 U/L / AST: 56 U/L / GGT: x           CARDIAC MARKERS ( 2021 21:23 )  x     / x     / 48 U/L / x     / x        Procalcitonin, Serum: 0.05 ng/mL (21 @ 07:19)    SARS-CoV-2: NotDetec (21 @ 18:14)  Rapid RVP Result: NotDetec (21 @ 18:14)    COVID-19 PCR: NotDetec (10-18-21 @ 14:15)    RECENT CULTURES:   @ 18:14      NotDetec    All imaging and other data have been reviewed.  < from: CT Chest w/ IV Cont (21 @ 19:56) >  IMPRESSION:  No evidence of pneumonia.  No intra-abdominal pathology.    Assessment and Plan:   77 yo man with PMH of DM2, CAD, BPH, COPD, Myasthenia gravis and seizures with brain hemangioma s/p laser treatment in , was admitted with fever. He was admitted at [facility] 10/18-10/21 for MG exacerbation and received 3 days of IVIG. On day after admission, he had one bout of watery diarrhea which self-resolved. The only complaint is pain left calf and foot. Since was admitted and was less active will rule out DVTs. No other source of infection at this time, CT chest and abdomen are negative. IVIG can cause fever but usually not after 2 weeks.     1- Fever  - Blood culture x 2   - UA negative   - Abdominal CT and Chest CT negative  - If diarrhea please send for c diff and GI PCR, currently resolved  - Left calf and foot pain, will get Doppler to rule out DVT   - Watch off antibiotics  - If fever continues or any hemodynamic instability will start ABx empirically     Thank you for courtesy of this consult.     Will follow.  Discussed with the primary team.   Dr. Kelly is covering this weekend.     Addendum:   Blood culture growing GNR, will follow results will order blood culture for tomorrow morning, start zosyn 3.375gm q8h. Will look for source possibly GI tract, due to diarrhea.     Priya Phillip MD  Division of Infectious Diseases   Cell [phone] between 8am and 6pm   After 6pm and weekends please call ID service at [phone].

## 2021-11-05 NOTE — SWALLOW BEDSIDE ASSESSMENT ADULT - SLP PERTINENT HISTORY OF CURRENT PROBLEM
Per charting, "75 yo M with PMH BPH, GERD. DM, COPD, CAD, seizures, brain hemagioma s/p laser treatment in 2008, and myasthenia gravis presents to the ED with diarrhea and global joint/muscle pain. Admitted for infectious workup. Consider reative arthritis given recent diarrhea. Consider rheum workup given global joint pain, joint warmth."
Per charting, "75 yo M with PMH BPH, GERD. DM, COPD, CAD, seizures, brain hemagioma s/p laser treatment in 2008, and myasthenia gravis presents to the ED with diarrhea and global joint/muscle pain. Admitted for infectious workup. Consider reative arthritis given recent diarrhea. Consider rheum workup given global joint pain, joint warmth."

## 2021-11-05 NOTE — PHYSICAL THERAPY INITIAL EVALUATION ADULT - RANGE OF MOTION EXAMINATION, REHAB EVAL
Pain with RUE AROM/bilateral upper extremity ROM was WFL (within functional limits)/bilateral lower extremity ROM was WFL (within functional limits)/deficits as listed below

## 2021-11-05 NOTE — DISCHARGE NOTE PROVIDER - HOSPITAL COURSE
FROM ADMISSION HPI: 77 yo M with PMH BPH, GERD. DM, COPD, CAD, seizures, brain hemagioma s/p laser treatment in 2008, and myasthenia gravis presents to the ED with diarrhea. Of note patient was hospitalized recently at [facility] 10/18-10/21 for MG exacerbation and received 3 days of IVIG. On day after admission, pt developed 1 bout of watery diarrhea which self-resolved, however daughter believes 2/2 decreased PO intake and appetite. He also reports 2 days after admission, he developed muscle pain and weakness located in quadriceps area, 10/10, at rest and with ambulation. Pt states muscle weakness has been so severe he hasn't been able to shave. ROS is positive for fever, abdominal pain, 1 episode of diarrhea (now resolved), decreased appetite, chronic cough, muscle weakness, muscle pain. ROS is negative for chills, n/v, SOB, cp, palpitations, changes in vision or hearing.    In the ED  VS: T101.9 rectal, HR 89, /73, RR 18 SPO2 88% on RA  Labs: WBC 12.85, HH 12.7/38.4, PLTS 183, Na 137, K 4.1, Cr 0.97, Gluc 143, albumin 2.6, lactate 2.3, hs troponin negative x1,   Chest Xray: no acute lung pathology (personal read)  CT chest: pending  CT A/P: pending  EKG: Normal Sinus Rhythm Normal ECG  Given in ED: acetaminophen 650mg PO x1, zosyn, vancomycin 1gm, NS 1.850 L bolus x1      ---  HOSPITAL COURSE: Patient was admitted for fever.       Physical Therapy was consulted, recommends discharge to ----     Patient showed improvement throughout hospitalization. Patient was seen and examined on day of discharge:     VITALS:       PHYSICAL EXAM:    Patient was medically optimized for discharge with close outpatient follow up.    ---  CONSULTANTS:     ---  TIME SPENT:  I, the attending physician, was physically present for the key portions of the evaluation and management (E/M) service provided. The total amount of time spent reviewing the hospital notes, laboratory values, imaging findings, assessing/counseling the patient, discussing with consultant physicians, social work, nursing staff was -- minutes    ---  Primary care provider was made aware of plan for discharge:      [  ] NO     [ x ] YES   FROM ADMISSION HPI: 77 yo M with PMH BPH, GERD. DM, COPD, CAD, seizures, brain hemagioma s/p laser treatment in 2008, and myasthenia gravis presents to the ED with diarrhea. Of note patient was hospitalized recently at [facility] 10/18-10/21 for MG exacerbation and received 3 days of IVIG. On day after admission, pt developed 1 bout of watery diarrhea which self-resolved, however daughter believes 2/2 decreased PO intake and appetite. He also reports 2 days after admission, he developed muscle pain and weakness located in quadriceps area, 10/10, at rest and with ambulation. Pt states muscle weakness has been so severe he hasn't been able to shave. ROS is positive for fever, abdominal pain, 1 episode of diarrhea (now resolved), decreased appetite, chronic cough, muscle weakness, muscle pain. ROS is negative for chills, n/v, SOB, cp, palpitations, changes in vision or hearing.    In the ED  VS: T101.9 rectal, HR 89, /73, RR 18 SPO2 88% on RA  Labs: WBC 12.85, HH 12.7/38.4, PLTS 183, Na 137, K 4.1, Cr 0.97, Gluc 143, albumin 2.6, lactate 2.3, hs troponin negative x1,   Chest Xray: no acute lung pathology (personal read)  CT chest: pending  CT A/P: pending  EKG: Normal Sinus Rhythm Normal ECG  Given in ED: acetaminophen 650mg PO x1, zosyn, vancomycin 1gm, NS 1.850 L bolus x1      ---  HOSPITAL COURSE: Patient presented to ED with diarrhea, fever, hypoxemia and was admitted to telemetry for further infectious work up. CT abd/p showed no evidence of PNA or intra-abd pathology. Infectious disease was consulted. Procal slightly elevated, Lactic acid improved s/p IVF, urine culture neg, RVP neg. Patient started on IV Zosyn empirically. Blood culture x1 resulted + for salmonella. IV Zosyn d/c and patient started on 2g IV Rocephin qd. Repeat blood culture x1 NGTD********* TTE showed Normal LV systolic function, LVEF 55%, normal RV size and function, trace MR and TR, no signs of endocarditis.    Physical Therapy was consulted, recommends discharge to ----     Patient showed improvement throughout hospitalization. Patient was seen and examined on day of discharge:     VITALS:       PHYSICAL EXAM:    Patient was medically optimized for discharge with close outpatient follow up.    ---  CONSULTANTS:     ---  TIME SPENT:  I, the attending physician, was physically present for the key portions of the evaluation and management (E/M) service provided. The total amount of time spent reviewing the hospital notes, laboratory values, imaging findings, assessing/counseling the patient, discussing with consultant physicians, social work, nursing staff was -- minutes    ---  Primary care provider was made aware of plan for discharge:      [  ] NO     [ x ] YES   FROM ADMISSION HPI: 77 yo M with PMH BPH, GERD. DM, COPD, CAD, seizures, brain hemagioma s/p laser treatment in 2008, and myasthenia gravis presents to the ED with diarrhea. Of note patient was hospitalized recently at [facility] 10/18-10/21 for MG exacerbation and received 3 days of IVIG. On day after admission, pt developed 1 bout of watery diarrhea which self-resolved, however daughter believes 2/2 decreased PO intake and appetite. He also reports 2 days after admission, he developed muscle pain and weakness located in quadriceps area, 10/10, at rest and with ambulation. Pt states muscle weakness has been so severe he hasn't been able to shave. ROS is positive for fever, abdominal pain, 1 episode of diarrhea (now resolved), decreased appetite, chronic cough, muscle weakness, muscle pain. ROS is negative for chills, n/v, SOB, cp, palpitations, changes in vision or hearing.    In the ED  VS: T101.9 rectal, HR 89, /73, RR 18 SPO2 88% on RA  Labs: WBC 12.85, HH 12.7/38.4, PLTS 183, Na 137, K 4.1, Cr 0.97, Gluc 143, albumin 2.6, lactate 2.3, hs troponin negative x1,   Chest Xray: no acute lung pathology (personal read)  CT chest: pending  CT A/P: pending  EKG: Normal Sinus Rhythm Normal ECG  Given in ED: acetaminophen 650mg PO x1, zosyn, vancomycin 1gm, NS 1.850 L bolus x1      ---  HOSPITAL COURSE: Patient presented to ED with diarrhea, fever, hypoxemia and was admitted to telemetry for further infectious work up. CT abd/p showed no evidence of PNA or intra-abd pathology. Infectious disease was consulted. Procal slightly elevated, Lactic acid improved s/p IVF, urine culture neg, RVP neg. Patient started on IV Zosyn empirically. Blood culture x1 resulted + for salmonella. IV Zosyn d/c and patient started on 2g IV Rocephin qd. TTE showed Normal LV systolic function, LVEF 55%, normal RV size and function, trace MR and TR, no signs of endocarditis. Repeat blood culture x1 NGTD*********. Diarrhea resolved and stool cultures/c. diff PCR not obtained. Hypoxemia resolved s/p albuterol. Patient also presented with diffuse joint pain with PMH myasthenia gravis. Xray of R knee showed tricompartment degenerative changes, small suprapatellar effusion. Statin was held in setting of myopathy and pain managed with PO meds. Rheumatology was consulted and deemed no evidence of crystal induced arthropathy and to follow CRP. Neurology was consulted given myasthenia gravis, current outpatient regimen continued. During hospital stay patient's LFTs uptrended. Abd US was negative for acute cholecystitis or cholelithiasis. GI was consulted suspected multifactorial etiology given bacteremia, Rocephin, but rec no immediate intervention. Patient clinically       Physical Therapy was consulted, recommends discharge to ----     Patient showed improvement throughout hospitalization. Patient was seen and examined on day of discharge:     VITALS:       PHYSICAL EXAM:    Patient was medically optimized for discharge with close outpatient follow up.    ---  CONSULTANTS:     ---  TIME SPENT:  I, the attending physician, was physically present for the key portions of the evaluation and management (E/M) service provided. The total amount of time spent reviewing the hospital notes, laboratory values, imaging findings, assessing/counseling the patient, discussing with consultant physicians, social work, nursing staff was -- minutes    ---  Primary care provider was made aware of plan for discharge:      [  ] NO     [ x ] YES   FROM ADMISSION HPI: 75 yo M with PMH BPH, GERD. DM, COPD, CAD, seizures, brain hemagioma s/p laser treatment in 2008, and myasthenia gravis presents to the ED with diarrhea. Of note patient was hospitalized recently at [facility] 10/18-10/21 for MG exacerbation and received 3 days of IVIG. On day after admission, pt developed 1 bout of watery diarrhea which self-resolved, however daughter believes 2/2 decreased PO intake and appetite. He also reports 2 days after admission, he developed muscle pain and weakness located in quadriceps area, 10/10, at rest and with ambulation. Pt states muscle weakness has been so severe he hasn't been able to shave. ROS is positive for fever, abdominal pain, 1 episode of diarrhea (now resolved), decreased appetite, chronic cough, muscle weakness, muscle pain. ROS is negative for chills, n/v, SOB, cp, palpitations, changes in vision or hearing.    In the ED  VS: T101.9 rectal, HR 89, /73, RR 18 SPO2 88% on RA  Labs: WBC 12.85, HH 12.7/38.4, PLTS 183, Na 137, K 4.1, Cr 0.97, Gluc 143, albumin 2.6, lactate 2.3, hs troponin negative x1,   Chest Xray: no acute lung pathology (personal read)  CT chest: pending  CT A/P: pending  EKG: Normal Sinus Rhythm Normal ECG  Given in ED: acetaminophen 650mg PO x1, zosyn, vancomycin 1gm, NS 1.850 L bolus x1      ---  HOSPITAL COURSE: Patient presented to ED with diarrhea, fever, hypoxemia and was admitted to telemetry for further infectious work up. CT abd/p showed no evidence of PNA or intra-abd pathology. Infectious disease was consulted. Procal slightly elevated, Lactic acid improved s/p IVF, urine culture neg, RVP neg. Patient started on IV Zosyn empirically. Blood culture x1 resulted + for salmonella. IV Zosyn d/c and patient started on 2g IV Rocephin qd. TTE showed Normal LV systolic function, LVEF 55%, normal RV size and function, trace MR and TR, no signs of endocarditis. Repeat blood culture x1 NGTD*********. Diarrhea resolved and stool cultures/c. diff PCR not obtained. Hypoxemia resolved s/p albuterol. Patient also presented with diffuse joint pain with PMH myasthenia gravis. Xray of R knee showed tricompartment degenerative changes, small suprapatellar effusion. Statin was held in setting of myopathy and pain managed with PO meds. Rheumatology was consulted and deemed no evidence of crystal induced arthropathy and to follow CRP. Neurology was consulted given myasthenia gravis, current outpatient regimen continued. During hospital stay patient's LFTs uptrended. Abd US was negative for acute cholecystitis or cholelithiasis. GI was consulted suspected multifactorial etiology given bacteremia, Rocephin, but rec no immediate intervention. Physical Therapy was consulted, recommends discharge home with home PT vs. [facility] **********    Patient showed improvement throughout hospitalization. Patient was seen and examined on day of discharge:     VITALS:       PHYSICAL EXAM:    Patient was medically optimized for discharge with close outpatient follow up.    ---  CONSULTANTS:   GI: Dr. Arroyo  Neuro: Dr. Bonilla  ID: Dr. Kelly  Pulm: Dr. Carcamo  Rheum: Dr. Rivera    ---  TIME SPENT:  I, the attending physician, was physically present for the key portions of the evaluation and management (E/M) service provided. The total amount of time spent reviewing the hospital notes, laboratory values, imaging findings, assessing/counseling the patient, discussing with consultant physicians, social work, nursing staff was -- minutes    ---     FROM ADMISSION HPI: 75 yo M with PMH BPH, GERD. DM, COPD, CAD, seizures, brain hemagioma s/p laser treatment in 2008, and myasthenia gravis presents to the ED with diarrhea. Of note patient was hospitalized recently at [facility] 10/18-10/21 for MG exacerbation and received 3 days of IVIG. On day after admission, pt developed 1 bout of watery diarrhea which self-resolved, however daughter believes 2/2 decreased PO intake and appetite. He also reports 2 days after admission, he developed muscle pain and weakness located in quadriceps area, 10/10, at rest and with ambulation. Pt states muscle weakness has been so severe he hasn't been able to shave. ROS is positive for fever, abdominal pain, 1 episode of diarrhea (now resolved), decreased appetite, chronic cough, muscle weakness, muscle pain. ROS is negative for chills, n/v, SOB, cp, palpitations, changes in vision or hearing.    In the ED  VS: T101.9 rectal, HR 89, /73, RR 18 SPO2 88% on RA  Labs: WBC 12.85, HH 12.7/38.4, PLTS 183, Na 137, K 4.1, Cr 0.97, Gluc 143, albumin 2.6, lactate 2.3, hs troponin negative x1,   Chest Xray: no acute lung pathology (personal read)  CT chest: pending  CT A/P: pending  EKG: Normal Sinus Rhythm Normal ECG  Given in ED: acetaminophen 650mg PO x1, zosyn, vancomycin 1gm, NS 1.850 L bolus x1      ---  HOSPITAL COURSE: Patient presented to ED with diarrhea, fever, hypoxemia and was admitted to telemetry for further infectious work up. CT abd/p showed no evidence of PNA or intra-abd pathology. Infectious disease was consulted. Procal slightly elevated, Lactic acid improved s/p IVF, urine culture neg, RVP neg. Patient started on IV Zosyn empirically. Blood culture x1 resulted + for salmonella. IV Zosyn d/c and patient started on 2g IV Rocephin qd. TTE showed Normal LV systolic function, LVEF 55%, normal RV size and function, trace MR and TR, no signs of endocarditis. Repeat blood culture x1 NGTD after 48 hours, final pending. Diarrhea resolved and stool cultures/c. diff PCR not obtained. Hypoxemia resolved s/p albuterol. Patient also presented with diffuse joint pain with PMH myasthenia gravis. Xray of R knee showed tricompartment degenerative changes, small suprapatellar effusion. Statin was held in setting of myopathy and pain managed with PO meds. Rheumatology was consulted and deemed no evidence of crystal induced arthropathy and to follow CRP. Neurology was consulted given myasthenia gravis, current outpatient regimen continued. During hospital stay patient's LFTs uptrended. Abd US was negative for acute cholecystitis or cholelithiasis. GI was consulted suspected multifactorial etiology given bacteremia, Rocephin, but rec no immediate intervention. LFTs were downtrending upon discharge. Physical Therapy was consulted, recommends discharge home with home assist.    Patient showed improvement throughout hospitalization. Patient was seen and examined on day of discharge:     T(C): 36.5 (11-09-21 @ 04:14), Max: 36.7 (11-08-21 @ 17:38)  HR: 72 (11-09-21 @ 04:14) (64 - 72)  BP: 128/78 (11-09-21 @ 04:14) (119/61 - 128/78)  RR: 18 (11-09-21 @ 04:14) (18 - 18)  SpO2: 94% (11-09-21 @ 04:14) (91% - 94%)      PHYSICAL EXAM:  GENERAL: NAD, lying in bed comfortably  CHEST/LUNG: cta bl. No rales, or rhonchi. Unlabored respirations  HEART: Regular rate and rhythm; No murmurs.  ABDOMEN: Bowel sounds present. Soft, Nontender, Mild distention.    EXTREMITIES:  2+ Peripheral Pulses. No clubbing, cyanosis, or edema  NERVOUS SYSTEM:  Alert & Oriented X3, speech clear. No acute deficits   SKIN: No rashes or lesions      Patient was medically optimized for discharge with close outpatient follow up.    ---  CONSULTANTS:   GI: Dr. Arroyo  Neuro: Dr. Bonilla  ID: Dr. Kelly  Pulm: Dr. Carcamo  Rheum: Dr. Rivera    ---  TIME SPENT:  I, the attending physician, was physically present for the key portions of the evaluation and management (E/M) service provided. The total amount of time spent reviewing the hospital notes, laboratory values, imaging findings, assessing/counseling the patient, discussing with consultant physicians, social work, nursing staff was -- minutes    ---     FROM ADMISSION HPI: 77 yo M with PMH BPH, GERD. DM, COPD, CAD, seizures, brain hemagioma s/p laser treatment in 2008, and myasthenia gravis presents to the ED with diarrhea. Of note patient was hospitalized recently at [facility] 10/18-10/21 for MG exacerbation and received 3 days of IVIG. On day after admission, pt developed 1 bout of watery diarrhea which self-resolved, however daughter believes 2/2 decreased PO intake and appetite. He also reports 2 days after admission, he developed muscle pain and weakness located in quadriceps area, 10/10, at rest and with ambulation. Pt states muscle weakness has been so severe he hasn't been able to shave. ROS is positive for fever, abdominal pain, 1 episode of diarrhea (now resolved), decreased appetite, chronic cough, muscle weakness, muscle pain. ROS is negative for chills, n/v, SOB, cp, palpitations, changes in vision or hearing.    In the ED  VS: T101.9 rectal, HR 89, /73, RR 18 SPO2 88% on RA  Labs: WBC 12.85, HH 12.7/38.4, PLTS 183, Na 137, K 4.1, Cr 0.97, Gluc 143, albumin 2.6, lactate 2.3, hs troponin negative x1,   Chest Xray: no acute lung pathology (personal read)  CT chest: pending  CT A/P: pending  EKG: Normal Sinus Rhythm Normal ECG  Given in ED: acetaminophen 650mg PO x1, zosyn, vancomycin 1gm, NS 1.850 L bolus x1      ---  HOSPITAL COURSE: Patient presented to ED with diarrhea, fever, hypoxemia and was admitted to telemetry for further infectious work up. CT abd/p showed no evidence of PNA or intra-abd pathology. Infectious disease was consulted. Procal slightly elevated, Lactic acid improved s/p IVF, urine culture neg, RVP neg. Patient started on IV Zosyn empirically. Blood culture x1 resulted + for salmonella. IV Zosyn d/c and patient started on 2g IV Rocephin qd. TTE showed Normal LV systolic function, LVEF 55%, normal RV size and function, trace MR and TR, no signs of endocarditis. Repeat blood culture x1 NGTD after 48 hours, final pending. Diarrhea resolved and stool cultures/c. diff PCR not obtained. Hypoxemia resolved s/p albuterol. Patient also presented with diffuse joint pain with PMH myasthenia gravis. Xray of R knee showed tricompartment degenerative changes, small suprapatellar effusion. Statin was held in setting of myopathy and pain managed with PO meds. Rheumatology was consulted and deemed no evidence of crystal induced arthropathy and to follow CRP. Neurology was consulted given myasthenia gravis, current outpatient regimen continued. During hospital stay patient's LFTs uptrended. Abd US was negative for acute cholecystitis or cholelithiasis. GI was consulted suspected multifactorial etiology given bacteremia, Rocephin, but rec no immediate intervention. LFTs were downtrending upon discharge however need to be repeated upon outpatient follow up. Physical Therapy was consulted, recommends discharge home with home assist.    Patient showed improvement throughout hospitalization. Patient was seen and examined on day of discharge:     T(C): 36.5 (11-09-21 @ 04:14), Max: 36.7 (11-08-21 @ 17:38)  HR: 72 (11-09-21 @ 04:14) (64 - 72)  BP: 128/78 (11-09-21 @ 04:14) (119/61 - 128/78)  RR: 18 (11-09-21 @ 04:14) (18 - 18)  SpO2: 94% (11-09-21 @ 04:14) (91% - 94%)      PHYSICAL EXAM:  GENERAL: NAD, lying in bed comfortably  CHEST/LUNG: cta bl. No rales, or rhonchi. Unlabored respirations  HEART: Regular rate and rhythm; No murmurs.  ABDOMEN: Bowel sounds present. Soft, Nontender, Mild distention.    EXTREMITIES:  2+ Peripheral Pulses. No clubbing, cyanosis, or edema  NERVOUS SYSTEM:  Alert & Oriented X3, speech clear. No acute deficits   SKIN: No rashes or lesions      Patient was medically optimized for discharge with close outpatient follow up.    ---  CONSULTANTS:   GI: Dr. Arroyo  Neuro: Dr. Bonilla  ID: Dr. Kelly  Pulm: Dr. aCrcamo  Rheum: Dr. Rivera    ---  TIME SPENT:  I, the attending physician, was physically present for the key portions of the evaluation and management (E/M) service provided. The total amount of time spent reviewing the hospital notes, laboratory values, imaging findings, assessing/counseling the patient, discussing with consultant physicians, social work, nursing staff was -- minutes    ---     FROM ADMISSION HPI: 77 yo M with PMH BPH, GERD. DM, COPD, CAD, seizures, brain hemagioma s/p laser treatment in 2008, and myasthenia gravis presents to the ED with diarrhea. Of note patient was hospitalized recently at [facility] 10/18-10/21 for MG exacerbation and received 3 days of IVIG. On day after admission, pt developed 1 bout of watery diarrhea which self-resolved, however daughter believes 2/2 decreased PO intake and appetite. He also reports 2 days after admission, he developed muscle pain and weakness located in quadriceps area, 10/10, at rest and with ambulation. Pt states muscle weakness has been so severe he hasn't been able to shave. ROS is positive for fever, abdominal pain, 1 episode of diarrhea (now resolved), decreased appetite, chronic cough, muscle weakness, muscle pain. ROS is negative for chills, n/v, SOB, cp, palpitations, changes in vision or hearing.    In the ED  VS: T101.9 rectal, HR 89, /73, RR 18 SPO2 88% on RA  Labs: WBC 12.85, HH 12.7/38.4, PLTS 183, Na 137, K 4.1, Cr 0.97, Gluc 143, albumin 2.6, lactate 2.3, hs troponin negative x1,   Chest Xray: no acute lung pathology (personal read)  CT chest: pending  CT A/P: pending  EKG: Normal Sinus Rhythm Normal ECG  Given in ED: acetaminophen 650mg PO x1, zosyn, vancomycin 1gm, NS 1.850 L bolus x1      ---  HOSPITAL COURSE: Patient presented to ED with diarrhea, fever, hypoxemia and was admitted to telemetry for further infectious work up. CT abd/p showed no evidence of PNA or intra-abd pathology. Infectious disease was consulted. Procal slightly elevated, Lactic acid improved s/p IVF, urine culture neg, RVP neg. Patient started on IV Zosyn empirically. Blood culture x1 resulted + for salmonella. IV Zosyn d/c and patient started on 2g IV Rocephin qd. TTE showed Normal LV systolic function, LVEF 55%, normal RV size and function, trace MR and TR, no signs of endocarditis. Repeat blood culture x1 NGTD after 48 hours, final pending. Diarrhea resolved and stool cultures/c. diff PCR not obtained. Hypoxemia resolved s/p albuterol. Patient also presented with diffuse joint pain with PMH myasthenia gravis. Xray of R knee showed tricompartment degenerative changes, small suprapatellar effusion. Statin was held in setting of myopathy and pain managed with PO meds. Rheumatology was consulted and deemed no evidence of crystal induced arthropathy and to follow CRP. Neurology was consulted given myasthenia gravis, current outpatient regimen continued. During hospital stay patient's LFTs uptrended. Abd US was negative for acute cholecystitis or cholelithiasis. GI was consulted suspected multifactorial etiology given bacteremia, Rocephin, but rec no immediate intervention. LFTs were downtrending upon discharge however need to be repeated upon outpatient follow up. Physical Therapy was consulted, recommends discharge home with home assist.    Patient showed improvement throughout hospitalization. Patient was seen and examined on day of discharge:     T(C): 36.5 (11-09-21 @ 04:14), Max: 36.7 (11-08-21 @ 17:38)  HR: 72 (11-09-21 @ 04:14) (64 - 72)  BP: 128/78 (11-09-21 @ 04:14) (119/61 - 128/78)  RR: 18 (11-09-21 @ 04:14) (18 - 18)  SpO2: 94% (11-09-21 @ 04:14) (91% - 94%)      PHYSICAL EXAM:  GENERAL: NAD, lying in bed comfortably  CHEST/LUNG: cta bl. No rales, or rhonchi. Unlabored respirations  HEART: Regular rate and rhythm; No murmurs.  ABDOMEN: Bowel sounds present. Soft, Nontender, Mild distention.    EXTREMITIES:  2+ Peripheral Pulses. No clubbing, cyanosis, or edema  NERVOUS SYSTEM:  Alert & Oriented X3, speech clear. No acute deficits   SKIN: No rashes or lesions      Patient was medically optimized for discharge with close outpatient follow up.    ---  CONSULTANTS:   GI: Dr. Arroyo  Neuro: Dr. Bonilla  ID: Dr. Kelly  Pulm: Dr. Carcamo  Rheum: Dr. Rivera    ---  TIME SPENT:  I, the attending physician, was physically present for the key portions of the evaluation and management (E/M) service provided. The total amount of time spent reviewing the hospital notes, laboratory values, imaging findings, assessing/counseling the patient, discussing with consultant physicians, social work, nursing staff was 75 minutes    ---

## 2021-11-05 NOTE — DISCHARGE NOTE PROVIDER - CARE PROVIDERS DIRECT ADDRESSES
,DirectAddress_Unknown,[email or web address]

## 2021-11-05 NOTE — PROGRESS NOTE ADULT - ASSESSMENT
CHARTING IN PROGRESS    77 yo M with PMH BPH, GERD. DM, COPD, CAD, seizures, brain hemagioma s/p laser treatment in 2008, and myasthenia gravis presents to the ED with diarrhea and global joint/muscle pain. Admitted for infectious workup. Consider reative arthritis given recent diarrhea. Consider rheum workup given global joint pain, joint warmth.       Problem/Plan - 1:  ·  Problem: Fever.   ·  Plan: Patient presenting with acute diarrhea, leukocytosis, hypoxia. Suspect gastroenteritis or URI  CTAP + CT Chest: No evidence of pneumonia. No intra-abdominal pathology.  - leukocytosis likely 2/2 to PO steroid use  - no hx of recent abx use or prior C-diff infection. hold off on C-diff PCR for now as Diarrhea resolved.  - Diet regular for now. Pt can tolerate PO, however decreased appetite.  - s/p vanc and zosyn in ED. Continue IV Rocephin 1g/day.  - f/u bcx x2.   - Lactic acid 1.0<-2.3  - f/u urine cx  - RVP - negative  - F/u Legionella   - In the setting of joint/muscle pains, consider rheum workup  - ID Dr. Phillip consulted, f/u recs.     Problem/Plan - 2:  ·  Problem: Muscle pain.   ·  Plan: Pt complains of 10/10 muscle pain and weakness since last admission, in the setting of fever, diarrhea, abdominal pain and decreased PO intake.  UA shows elevated urine urobilinogen, mildly elevated AST, elevated lactate. Suspect 2/2 to decreased PO intake leading to myopathy.  - HOLD statin in setting of myopathy.  - Pain regimen: Tylenol for mild pain. Tramadol 25 for moderate  - Continue PO prednisone 2.5mg QD  - CK - wnl  - Given recent hx of diarrhea, consider reactive arthritis  - Consider rheum workup given global joint pain, joint warmth.     Problem/Plan - 3:  ·  Problem: Myasthenia gravis.   ·  Plan: Chronic, stable  - Continue Mestinon 60 mg Q4 hrs  - Follows with Neuro Dr. Bonilla  - Neurology Consulted Dr Bonilla; recs appreciated.     Problem/Plan - 4:  ·  Problem: COPD without exacerbation.   ·  Plan: chronic, stable  88% on admission on RA, 92% on RA while examining  - continue prednisone 2.5mg qd, advair diskus 250-50, Duonebs Q6  - Monitor O2sat.     Problem/Plan - 5:  ·  Problem: Hyponatremia.   ·  Plan: Pt found to be hyponatremic on previous admission.  Sent home on Sodium Chloride 1g tabs QD  - Na - 140, continue holding  - Monitor daily CMP.     Problem/Plan - 6:  ·  Problem: Seizures.   ·  Plan: - continue oxcarbazpine 300 mg tid  -seizure precautions.     Problem/Plan - 7:  ·  Problem: HTN (hypertension).   ·  Plan: HTN  Chronic  - BP controlled here  - continue lisinopril 2.5 qd with hold parameters.     Problem/Plan - 8:  ·  Problem: HLD (hyperlipidemia).   ·  Plan: chronic, stable  - continue holding atorvastatin in setting of muscle pain     Problem/Plan - 9:  ·  Problem: CAD (coronary artery disease).   ·  Plan: - chronic, stable  -not on any home medications.   -f/u outpatient with PCP.     Problem/Plan - 10:  ·  Problem: DM (diabetes mellitus).   ·  Plan; - chronic, stable  - glu 143 on admission  - not on home medication  - hypoglycemia protocol ordered. If FS running high (>200) can start on sliding scale coverage  - daily CMPs.     Problem/Plan - 11:  ·  Problem: BPH (benign prostatic hyperplasia).   ·  Plan: chronic, stable.     Problem/Plan - 12:  ·  Problem: GERD (gastroesophageal reflux disease).   ·  Plan: - chronic, stable.   CHARTING IN PROGRESS    77 yo M with PMH BPH, GERD. DM, COPD, CAD, seizures, brain hemagioma s/p laser treatment in 2008, and myasthenia gravis presents to the ED with diarrhea and global joint/muscle pain. Admitted for infectious workup. Consider reative arthritis given recent diarrhea. Consider rheum workup given global joint pain, joint warmth.       Problem/Plan - 1:  ·  Problem: Fever.   ·  Plan: Patient presenting with acute diarrhea, leukocytosis, hypoxia. Suspect gastroenteritis or URI  CTAP + CT Chest: No evidence of pneumonia. No intra-abdominal pathology.  - leukocytosis likely 2/2 to PO steroid use  - no hx of recent abx use or prior C-diff infection. hold off on C-diff PCR for now as Diarrhea resolved.  - Diet regular for now. Pt can tolerate PO, however decreased appetite.  - s/p vanc and zosyn in ED. Continue IV Rocephin 1g/day.  - Lactic acid 1.0<-2.3  - Procal - 0.05  - f/u bcx x2.   - f/u urine cx  - RVP - negative  - F/u Legionella  - In the setting of joint/muscle pains, consider rheum workup  - ID Dr. Phillip consulted, f/u recs.     Problem/Plan - 2:  ·  Problem: Joint/muscle pain.   ·  Plan: Pt complains of 10/10 muscle pain and weakness since last admission, in the setting of fever, diarrhea, abdominal pain and decreased PO intake.  UA shows elevated urine urobilinogen, mildly elevated AST, elevated lactate. Suspect 2/2 to decreased PO intake leading to myopathy.  - HOLD statin in setting of myopathy.  - Pain regimen: Tylenol for mild pain. Tramadol 25 for moderate  - Continue PO prednisone 2.5mg QD  - CK - wnl  - Given recent hx of diarrhea, consider reactive arthritis  - Consider rheum workup given global joint pain, joint warmth.     Problem/Plan - 3:  ·  Problem: Myasthenia gravis.   ·  Plan: Chronic, stable  - Continue Mestinon 60 mg Q4 hrs  - Follows with Neuro Dr. Bonilla  - Neurology Consulted Dr Bonilla; recs appreciated.     Problem/Plan - 4:  ·  Problem: COPD without exacerbation.   ·  Plan: chronic, stable  88% on admission on RA, 92% on RA while examining  - continue prednisone 2.5mg qd, advair diskus 250-50, Duonebs Q6  - Monitor O2sat.     Problem/Plan - 5:  ·  Problem: Hyponatremia.   ·  Plan: Pt found to be hyponatremic on previous admission.  Sent home on Sodium Chloride 1g tabs QD  - Na - 140, continue holding  - Monitor daily CMP.     Problem/Plan - 6:  ·  Problem: Seizures.   ·  Plan: - continue oxcarbazpine 300 mg tid  -seizure precautions.     Problem/Plan - 7:  ·  Problem: HTN (hypertension).   ·  Plan: HTN  Chronic  - BP controlled here  - continue lisinopril 2.5 qd with hold parameters.     Problem/Plan - 8:  ·  Problem: HLD (hyperlipidemia).   ·  Plan: chronic, stable  - continue holding atorvastatin in setting of muscle pain     Problem/Plan - 9:  ·  Problem: CAD (coronary artery disease).   ·  Plan: - chronic, stable  -not on any home medications.   -f/u outpatient with PCP.     Problem/Plan - 10:  ·  Problem: DM (diabetes mellitus).   ·  Plan; - chronic, stable  - glu 143 on admission  - not on home medication  - hypoglycemia protocol ordered. If FS running high (>200) can start on sliding scale coverage  - daily CMPs.     Problem/Plan - 11:  ·  Problem: BPH (benign prostatic hyperplasia).   ·  Plan: chronic, stable.     Problem/Plan - 12:  ·  Problem: GERD (gastroesophageal reflux disease).   ·  Plan: - chronic, stable.   CHARTING IN PROGRESS    77 yo M with PMH BPH, GERD. DM, COPD, CAD, seizures, brain hemagioma s/p laser treatment in 2008, and myasthenia gravis presents to the ED with diarrhea and global joint/muscle pain. Admitted for infectious workup. Consider reative arthritis given recent diarrhea. Consider rheum workup given global joint pain, joint warmth.       Problem/Plan - 1:  ·  Problem: Fever.   ·  Plan: Patient presenting with acute diarrhea, leukocytosis, hypoxia. Suspect gastroenteritis or URI  CTAP + CT Chest: No evidence of pneumonia. No intra-abdominal pathology.  - leukocytosis likely 2/2 to PO steroid use  - no hx of recent abx use or prior C-diff infection. hold off on C-diff PCR for now as Diarrhea resolved.  - Diet regular for now. Pt can tolerate PO, however decreased appetite.  - s/p vanc and zosyn in ED. Continue IV Rocephin 1g/day.  - Lactic acid 1.0<-2.3  - Procal - 0.05  - f/u bcx x2.   - f/u urine cx  - RVP - negative  - F/u Legionella  - In the setting of joint/muscle pains, consider rheum workup  - ID Dr. Phillip consulted, f/u recs.     Problem/Plan - 2:  ·  Problem: Joint/muscle pain.   ·  Plan: Pt complains of 10/10 muscle pain and weakness since last admission, in the setting of fever, diarrhea, abdominal pain and decreased PO intake.  UA shows elevated urine urobilinogen, mildly elevated AST, elevated lactate. Suspect 2/2 to decreased PO intake leading to myopathy.  - HOLD statin in setting of myopathy.  - Pain regimen: Tylenol for mild pain. Tramadol 25 for moderate  - Continue PO prednisone 2.5mg QD  - CK - wnl  - Given recent hx of diarrhea, consider reactive arthritis  - Consider rheum workup given global joint pain, joint warmth.     Problem/Plan - 3:  ·  Problem: Myasthenia gravis.   ·  Plan: Chronic, stable  - Continue Mestinon 60 mg Q4 hrs  - Follows with Neuro Dr. Bonilla  - Neurology Consulted Dr Bonilla; recs appreciated.     Problem/Plan - 4:  ·  Problem: COPD without exacerbation.   ·  Plan: chronic, stable  88% on admission on RA, 92% on RA while examining  - continue prednisone 2.5mg qd, advair diskus 250-50, Duonebs Q6  - Monitor O2sat.     Problem/Plan - 5:  ·  Problem: Hyponatremia.   ·  Plan: Pt found to be hyponatremic on previous admission.  Sent home on Sodium Chloride 1g tabs QD  - Na - 140, continue holding  - Monitor daily CMP.     Problem/Plan - 6:  ·  Problem: Seizures.   ·  Plan: - continue oxcarbazpine 300 mg tid  -seizure precautions.     Problem/Plan - 7:  ·  Problem: HTN (hypertension).   ·  Plan: HTN  Chronic  - BP controlled here  - continue lisinopril 2.5 qd with hold parameters.     Problem/Plan - 8:  ·  Problem: HLD (hyperlipidemia).   ·  Plan: chronic, stable  - continue holding atorvastatin in setting of muscle pain     Problem/Plan - 9:  ·  Problem: CAD (coronary artery disease).   ·  Plan: - chronic, stable  -not on any home medications.   -f/u outpatient with PCP.     Problem/Plan - 10:  ·  Problem: DM (diabetes mellitus).   ·  Plan; - chronic, stable  - glu 143 on admission  - not on home medication  - hypoglycemia protocol ordered. If FS running high (>200) can start on sliding scale coverage  - daily CMPs.     Problem/Plan - 11:  ·  Problem: BPH (benign prostatic hyperplasia).   ·  Plan: chronic, stable.     Problem/Plan - 12:  ·  Problem: GERD (gastroesophageal reflux disease).   ·  Plan: - chronic, stable.     Problem/Plan - 13:  ·  Problem: DVT ppx  ·  Plan: Lovenox 40mg subq, qd   75 yo M with PMH BPH, GERD. DM, COPD, CAD, seizures, brain hemagioma s/p laser treatment in 2008, and myasthenia gravis presents to the ED with diarrhea and global joint/muscle pain. Admitted for infectious workup. Consider reative arthritis given recent diarrhea. Consider rheum workup given global joint pain, joint warmth.       Problem/Plan - 1:  ·  Problem: Fever.   ·  Plan: Patient presenting with acute diarrhea, leukocytosis, hypoxia. Suspect gastroenteritis or URI  CTAP + CT Chest: No evidence of pneumonia. No intra-abdominal pathology.  - leukocytosis likely 2/2 to PO steroid use  - no hx of recent abx use or prior C-diff infection. hold off on C-diff PCR for now as Diarrhea resolved.  - Diet regular for now. Pt can tolerate PO, however decreased appetite.  - s/p vanc and zosyn in ED. Continue IV Rocephin 1g/day.  - Lactic acid 1.0<-2.3  - Procal - 0.05  - f/u bcx x2.   - f/u urine cx  - RVP - negative  - F/u Legionella  - In the setting of joint/muscle pains, consider rheum workup  - ID Dr. Phillip consulted, f/u recs.     Problem/Plan - 2:  ·  Problem: Joint/muscle pain.   ·  Plan: Pt complains of 10/10 muscle pain and weakness since last admission, in the setting of fever, diarrhea, abdominal pain and decreased PO intake.  UA shows elevated urine urobilinogen, mildly elevated AST, elevated lactate. Suspect 2/2 to decreased PO intake leading to myopathy.  - HOLD statin in setting of myopathy.  - Pain regimen: Tylenol for mild pain. Tramadol 25 for moderate  - Continue PO prednisone 2.5mg QD  - CK - wnl  - Given recent hx of diarrhea, consider reactive arthritis  - Consider rheum workup given global joint pain, joint warmth.     Problem/Plan - 3:  ·  Problem: Myasthenia gravis.   ·  Plan: Chronic, stable  - Continue Mestinon 60 mg Q4 hrs  - Follows with Neuro Dr. Bonilla  - Neurology Consulted Dr Bonilla; recs appreciated.     Problem/Plan - 4:  ·  Problem: COPD without exacerbation.   ·  Plan: chronic, stable  88% on admission on RA, 92% on RA while examining  - continue prednisone 2.5mg qd, advair diskus 250-50, Duonebs Q6  - Monitor O2sat.     Problem/Plan - 5:  ·  Problem: Hyponatremia.   ·  Plan: Pt found to be hyponatremic on previous admission.  Sent home on Sodium Chloride 1g tabs QD  - Na - 140, continue holding  - Monitor daily CMP.     Problem/Plan - 6:  ·  Problem: Seizures.   ·  Plan: - continue oxcarbazpine 300 mg tid  -seizure precautions.     Problem/Plan - 7:  ·  Problem: HTN (hypertension).   ·  Plan: HTN  Chronic  - BP controlled here  - continue lisinopril 2.5 qd with hold parameters.     Problem/Plan - 8:  ·  Problem: HLD (hyperlipidemia).   ·  Plan: chronic, stable  - continue holding atorvastatin in setting of muscle pain     Problem/Plan - 9:  ·  Problem: CAD (coronary artery disease).   ·  Plan: - chronic, stable  -not on any home medications.   -f/u outpatient with PCP.     Problem/Plan - 10:  ·  Problem: DM (diabetes mellitus).   ·  Plan; - chronic, stable  - glu 143 on admission  - not on home medication  - hypoglycemia protocol ordered. If FS running high (>200) can start on sliding scale coverage  - daily CMPs.     Problem/Plan - 11:  ·  Problem: BPH (benign prostatic hyperplasia).   ·  Plan: chronic, stable.     Problem/Plan - 12:  ·  Problem: GERD (gastroesophageal reflux disease).   ·  Plan: - chronic, stable.     Problem/Plan - 13:  ·  Problem: DVT ppx  ·  Plan: Lovenox 40mg subq, qd

## 2021-11-05 NOTE — PHYSICAL THERAPY INITIAL EVALUATION ADULT - ADDITIONAL COMMENTS
(EMR) he lives with his wife in a private home. 1 step to enter no stairs inside. Per patient, he was ambulating and performing ADL's independently PTA. Patient stated he owns a wheelchair and cane, not certain if he has a RW. Denies home O2 PTA. Per patient, his daughter Sintia is his support system and stated his wife is not involved in his care.

## 2021-11-05 NOTE — DISCHARGE NOTE PROVIDER - NSDCFUADDINST_GEN_ALL_CORE_FT
Please follow up with your neurologist and primary care doctor within 1 week of discharge from the hospital. Please follow up with an infectious disease specialist within 1 week of discharge from the hospital.

## 2021-11-06 LAB
ALBUMIN SERPL ELPH-MCNC: 2.3 G/DL — LOW (ref 3.3–5)
ALP SERPL-CCNC: 66 U/L — SIGNIFICANT CHANGE UP (ref 40–120)
ALT FLD-CCNC: 99 U/L — HIGH (ref 12–78)
ANION GAP SERPL CALC-SCNC: 7 MMOL/L — SIGNIFICANT CHANGE UP (ref 5–17)
AST SERPL-CCNC: 71 U/L — HIGH (ref 15–37)
BASOPHILS # BLD AUTO: 0.02 K/UL — SIGNIFICANT CHANGE UP (ref 0–0.2)
BASOPHILS NFR BLD AUTO: 0.2 % — SIGNIFICANT CHANGE UP (ref 0–2)
BILIRUB SERPL-MCNC: 0.6 MG/DL — SIGNIFICANT CHANGE UP (ref 0.2–1.2)
BUN SERPL-MCNC: 18 MG/DL — SIGNIFICANT CHANGE UP (ref 7–23)
CALCIUM SERPL-MCNC: 9 MG/DL — SIGNIFICANT CHANGE UP (ref 8.5–10.1)
CHLORIDE SERPL-SCNC: 107 MMOL/L — SIGNIFICANT CHANGE UP (ref 96–108)
CO2 SERPL-SCNC: 27 MMOL/L — SIGNIFICANT CHANGE UP (ref 22–31)
CREAT SERPL-MCNC: 0.85 MG/DL — SIGNIFICANT CHANGE UP (ref 0.5–1.3)
EOSINOPHIL # BLD AUTO: 0.08 K/UL — SIGNIFICANT CHANGE UP (ref 0–0.5)
EOSINOPHIL NFR BLD AUTO: 0.7 % — SIGNIFICANT CHANGE UP (ref 0–6)
GLUCOSE SERPL-MCNC: 164 MG/DL — HIGH (ref 70–99)
HCT VFR BLD CALC: 36.9 % — LOW (ref 39–50)
HGB BLD-MCNC: 11.8 G/DL — LOW (ref 13–17)
IMM GRANULOCYTES NFR BLD AUTO: 0.4 % — SIGNIFICANT CHANGE UP (ref 0–1.5)
LYMPHOCYTES # BLD AUTO: 1.21 K/UL — SIGNIFICANT CHANGE UP (ref 1–3.3)
LYMPHOCYTES # BLD AUTO: 10.6 % — LOW (ref 13–44)
MAGNESIUM SERPL-MCNC: 2.2 MG/DL — SIGNIFICANT CHANGE UP (ref 1.6–2.6)
MCHC RBC-ENTMCNC: 31.6 PG — SIGNIFICANT CHANGE UP (ref 27–34)
MCHC RBC-ENTMCNC: 32 GM/DL — SIGNIFICANT CHANGE UP (ref 32–36)
MCV RBC AUTO: 98.7 FL — SIGNIFICANT CHANGE UP (ref 80–100)
MONOCYTES # BLD AUTO: 0.91 K/UL — HIGH (ref 0–0.9)
MONOCYTES NFR BLD AUTO: 8 % — SIGNIFICANT CHANGE UP (ref 2–14)
NEUTROPHILS # BLD AUTO: 9.12 K/UL — HIGH (ref 1.8–7.4)
NEUTROPHILS NFR BLD AUTO: 80.1 % — HIGH (ref 43–77)
NRBC # BLD: 0 /100 WBCS — SIGNIFICANT CHANGE UP (ref 0–0)
PHOSPHATE SERPL-MCNC: 4.1 MG/DL — SIGNIFICANT CHANGE UP (ref 2.5–4.5)
PLATELET # BLD AUTO: 188 K/UL — SIGNIFICANT CHANGE UP (ref 150–400)
POTASSIUM SERPL-MCNC: 3.8 MMOL/L — SIGNIFICANT CHANGE UP (ref 3.5–5.3)
POTASSIUM SERPL-SCNC: 3.8 MMOL/L — SIGNIFICANT CHANGE UP (ref 3.5–5.3)
PROT SERPL-MCNC: 7.2 G/DL — SIGNIFICANT CHANGE UP (ref 6–8.3)
RBC # BLD: 3.74 M/UL — LOW (ref 4.2–5.8)
RBC # FLD: 15.3 % — HIGH (ref 10.3–14.5)
SODIUM SERPL-SCNC: 141 MMOL/L — SIGNIFICANT CHANGE UP (ref 135–145)
WBC # BLD: 11.39 K/UL — HIGH (ref 3.8–10.5)
WBC # FLD AUTO: 11.39 K/UL — HIGH (ref 3.8–10.5)

## 2021-11-06 PROCEDURE — 93306 TTE W/DOPPLER COMPLETE: CPT | Mod: 26

## 2021-11-06 PROCEDURE — 99233 SBSQ HOSP IP/OBS HIGH 50: CPT

## 2021-11-06 PROCEDURE — 99233 SBSQ HOSP IP/OBS HIGH 50: CPT | Mod: GC

## 2021-11-06 RX ORDER — CEFTRIAXONE 500 MG/1
2000 INJECTION, POWDER, FOR SOLUTION INTRAMUSCULAR; INTRAVENOUS EVERY 24 HOURS
Refills: 0 | Status: DISCONTINUED | OUTPATIENT
Start: 2021-11-06 | End: 2021-11-09

## 2021-11-06 RX ORDER — ALBUTEROL 90 UG/1
2 AEROSOL, METERED ORAL EVERY 6 HOURS
Refills: 0 | Status: DISCONTINUED | OUTPATIENT
Start: 2021-11-06 | End: 2021-11-09

## 2021-11-06 RX ADMIN — Medication 650 MILLIGRAM(S): at 05:55

## 2021-11-06 RX ADMIN — ENOXAPARIN SODIUM 40 MILLIGRAM(S): 100 INJECTION SUBCUTANEOUS at 11:47

## 2021-11-06 RX ADMIN — LISINOPRIL 2.5 MILLIGRAM(S): 2.5 TABLET ORAL at 05:55

## 2021-11-06 RX ADMIN — Medication 650 MILLIGRAM(S): at 07:58

## 2021-11-06 RX ADMIN — PYRIDOSTIGMINE BROMIDE 60 MILLIGRAM(S): 60 SOLUTION ORAL at 09:33

## 2021-11-06 RX ADMIN — PYRIDOSTIGMINE BROMIDE 60 MILLIGRAM(S): 60 SOLUTION ORAL at 17:30

## 2021-11-06 RX ADMIN — OXCARBAZEPINE 300 MILLIGRAM(S): 300 TABLET, FILM COATED ORAL at 21:14

## 2021-11-06 RX ADMIN — Medication 2.5 MILLIGRAM(S): at 05:56

## 2021-11-06 RX ADMIN — PYRIDOSTIGMINE BROMIDE 60 MILLIGRAM(S): 60 SOLUTION ORAL at 21:14

## 2021-11-06 RX ADMIN — Medication 250 MILLIGRAM(S): at 05:55

## 2021-11-06 RX ADMIN — PYRIDOSTIGMINE BROMIDE 60 MILLIGRAM(S): 60 SOLUTION ORAL at 05:56

## 2021-11-06 RX ADMIN — OXCARBAZEPINE 300 MILLIGRAM(S): 300 TABLET, FILM COATED ORAL at 05:58

## 2021-11-06 RX ADMIN — Medication 250 MILLIGRAM(S): at 17:30

## 2021-11-06 RX ADMIN — PYRIDOSTIGMINE BROMIDE 60 MILLIGRAM(S): 60 SOLUTION ORAL at 13:18

## 2021-11-06 RX ADMIN — OXCARBAZEPINE 300 MILLIGRAM(S): 300 TABLET, FILM COATED ORAL at 13:18

## 2021-11-06 RX ADMIN — BUDESONIDE AND FORMOTEROL FUMARATE DIHYDRATE 2 PUFF(S): 160; 4.5 AEROSOL RESPIRATORY (INHALATION) at 19:58

## 2021-11-06 RX ADMIN — BUDESONIDE AND FORMOTEROL FUMARATE DIHYDRATE 2 PUFF(S): 160; 4.5 AEROSOL RESPIRATORY (INHALATION) at 05:56

## 2021-11-06 RX ADMIN — PYRIDOSTIGMINE BROMIDE 60 MILLIGRAM(S): 60 SOLUTION ORAL at 02:05

## 2021-11-06 RX ADMIN — PIPERACILLIN AND TAZOBACTAM 25 GRAM(S): 4; .5 INJECTION, POWDER, LYOPHILIZED, FOR SOLUTION INTRAVENOUS at 05:58

## 2021-11-06 NOTE — PROGRESS NOTE ADULT - SUBJECTIVE AND OBJECTIVE BOX
[facility]  INFECTIOUS DISEASES - [address], [place] [address]  Tel: [phone]     Fax: [phone]  =======================================================    JACKIE LÓPEZ [id number]    Follow up: No fevers. Denies any abdominal pain, nausea or diarrhea. Thinks his knee feels better.    Allergies:  No Known Allergies      Antibiotics:  acetaminophen     Tablet .. 650 milliGRAM(s) Oral every 6 hours PRN  albuterol/ipratropium for Nebulization 3 milliLiter(s) Nebulizer every 6 hours PRN  aluminum hydroxide/magnesium hydroxide/simethicone Suspension 30 milliLiter(s) Oral every 4 hours PRN  budesonide 160 MICROgram(s)/formoterol 4.5 MICROgram(s) Inhaler 2 Puff(s) Inhalation two times a day  cefTRIAXone   IVPB 2000 milliGRAM(s) IV Intermittent every 24 hours  enoxaparin Injectable 40 milliGRAM(s) SubCutaneous daily  lisinopril 2.5 milliGRAM(s) Oral daily  melatonin 3 milliGRAM(s) Oral at bedtime PRN  ondansetron Injectable 4 milliGRAM(s) IV Push every 8 hours PRN  OXcarbazepine 300 milliGRAM(s) Oral <User Schedule>  predniSONE   Tablet 2.5 milliGRAM(s) Oral daily  pyridostigmine 60 milliGRAM(s) Oral every 4 hours  saccharomyces boulardii 250 milliGRAM(s) Oral two times a day  sodium chloride 0.9%. 1000 milliLiter(s) IV Continuous <Continuous>  traMADol 25 milliGRAM(s) Oral every 6 hours PRN       REVIEW OF SYSTEMS:  CONSTITUTIONAL:  No Fever or chills  HEENT:   No sore throat or runny nose.  CARDIOVASCULAR:  No chest pain or SOB  RESPIRATORY:  No cough, shortness of breath  GASTROINTESTINAL:  see HPI  GENITOURINARY:  No dysuria  MUSCULOSKELETAL:  no knee pain, no back pain  NEUROLOGIC:  No headache, no dizziness  PSYCHIATRIC:  No disorder of thought or mood.       Physical Exam:  ICU Vital Signs Last 24 Hrs  T(C): 36.8 (2021 04:52), Max: 37.3 (2021 20:13)  T(F): 98.3 (2021 04:52), Max: 99.1 (2021 20:13)  HR: 67 (2021 04:52) (67 - 85)  BP: 148/72 (2021 04:52) (129/67 - 148/72)  BP(mean): --  ABP: --  ABP(mean): --  RR: 18 (2021 04:52) (18 - 18)  SpO2: 93% (2021 04:52) (91% - 93%)     GEN: NAD  HEENT: normocephalic and atraumatic  NECK: Supple  LUNGS: Clear to auscultation.  HEART: Regular rate and rhythm  ABDOMEN: Soft, nontender, and nondistended.  EXTREMITIES: No leg edema  MSK: no knee swelling or erythema  NEUROLOGIC: grossly intact.  PSYCHIATRIC: Appropriate affect .      Labs:      141  |  107  |  18  ----------------------------<  164<H>  3.8   |  27  |  0.85    Ca    9.0      2021 09:32  Phos  4.1     11  Mg     2.2         TPro  7.2  /  Alb  2.3<L>  /  TBili  0.6  /  DBili  x   /  AST  71<H>  /  ALT  99<H>  /  AlkPhos  66  11-06                          11.8   11.39 )-----------( 188      ( 2021 09:32 )             36.9     PT/INR - ( 2021 18:14 )   PT: 13.1 sec;   INR: 1.13 ratio         PTT - ( 2021 18:14 )  PTT:27.9 sec  Urinalysis Basic - ( 2021 19:21 )    Color: Yellow / Appearance: Clear / S.020 / pH: x  Gluc: x / Ketone: Negative  / Bili: Negative / Urobili: 4   Blood: x / Protein: 30 mg/dL / Nitrite: Negative   Leuk Esterase: Trace / RBC: 0-2 /HPF / WBC 3-5   Sq Epi: x / Non Sq Epi: Few / Bacteria: x      LIVER FUNCTIONS - ( 2021 09:32 )  Alb: 2.3 g/dL / Pro: 7.2 g/dL / ALK PHOS: 66 U/L / ALT: 99 U/L / AST: 71 U/L / GGT: x             RECENT CULTURES:   @ 00:42 .Blood Blood-Peripheral Blood Culture PCR    Growth in aerobic bottle: Gram Negative Rods  ***Blood Panel PCR results on this specimen are available  approximately 3 hours after the Gram stain result.***  Gram stain, PCR, and/or culture results may not always  correspond due to difference in methodologies.  ************************************************************  This PCR assay was performed by multiplex PCR. This  Assay tests for 66 bacterial and resistance gene targets.  Please refer to the [facility] test directory  at [email or web address] for details.    Growth in aerobic bottle: Gram Negative Rods       @ 00:40 Clean Catch Clean Catch (Midstream)     <10,000 CFU/mL Normal Urogenital Katei         @ 18:14          NotDete        All imaging and data are reviewed.

## 2021-11-06 NOTE — PROGRESS NOTE ADULT - SUBJECTIVE AND OBJECTIVE BOX
Patient is a 76y old  Male who presents with a chief complaint of Fever (2021 10:51)    Subjective:  INTERVAL HPI/OVERNIGHT EVENTS: Patient seen and examined at bedside. No overnight events occurred. Patient persist with weakness. He has better appetite, tolerating PO, no diarrhea with + BM x1 today and 2 yesterday. Denies pain.  Denies fevers, chills, headache, lightheadedness, chest pain, dyspnea, abdominal pain, n/v/d/c.    MEDICATIONS  (STANDING):  budesonide 160 MICROgram(s)/formoterol 4.5 MICROgram(s) Inhaler 2 Puff(s) Inhalation two times a day  cefTRIAXone   IVPB 2000 milliGRAM(s) IV Intermittent every 24 hours  enoxaparin Injectable 40 milliGRAM(s) SubCutaneous daily  lisinopril 2.5 milliGRAM(s) Oral daily  OXcarbazepine 300 milliGRAM(s) Oral <User Schedule>  predniSONE   Tablet 2.5 milliGRAM(s) Oral daily  pyridostigmine 60 milliGRAM(s) Oral every 4 hours  saccharomyces boulardii 250 milliGRAM(s) Oral two times a day  sodium chloride 0.9%. 1000 milliLiter(s) (50 mL/Hr) IV Continuous <Continuous>    MEDICATIONS  (PRN):  acetaminophen     Tablet .. 650 milliGRAM(s) Oral every 6 hours PRN Temp greater or equal to 38C (100.4F), Mild Pain (1 - 3)  albuterol/ipratropium for Nebulization 3 milliLiter(s) Nebulizer every 6 hours PRN Shortness of Breath and/or Wheezing  aluminum hydroxide/magnesium hydroxide/simethicone Suspension 30 milliLiter(s) Oral every 4 hours PRN Dyspepsia  melatonin 3 milliGRAM(s) Oral at bedtime PRN Insomnia  ondansetron Injectable 4 milliGRAM(s) IV Push every 8 hours PRN Nausea and/or Vomiting  traMADol 25 milliGRAM(s) Oral every 6 hours PRN Moderate Pain (4 - 6)      Allergies  No Known Allergies    Intolerances      REVIEW OF SYSTEMS:  CONSTITUTIONAL: No fever or chills  HEENT:  No headache, no sore throat  RESPIRATORY: No cough, wheezing, or shortness of breath  CARDIOVASCULAR: No chest pain, palpitations  GASTROINTESTINAL: No abd pain, nausea, vomiting, or diarrhea  GENITOURINARY: No dysuria, frequency, or hematuria  NEUROLOGICAL: no  dizziness  MUSCULOSKELETAL: + myalgias and generalized weakness.      Objective:  Vital Signs Last 24 Hrs  T(C): 36.8 (2021 04:52), Max: 37.3 (2021 20:13)  T(F): 98.3 (2021 04:52), Max: 99.1 (2021 20:13)  HR: 67 (2021 04:52) (67 - 89)  BP: 148/72 (2021 04:52) (129/67 - 150/61)  RR: 18 (2021 04:52) (18 - 19)  SpO2: 93% (2021 04:52) (90% - 93%)    GENERAL: NAD, sitting in bed comfortably, O2 by NC  HEAD:  Atraumatic, Normocephalic  EYES: EOMI, PERRLA, conjunctiva and sclera clear  ENT: Moist mucous membranes  NECK: Supple, No JVD  CHEST/LUNG: Clear to auscultation bilaterally. No rales, rhonchi, wheezing. Unlabored respirations  HEART: Regular rate and rhythm. No murmurs, rubs, or gallops  ABDOMEN: Bowel sounds present; Soft, Nontender, Nondistended. No hepatomegaly  EXTREMITIES:  2+ Peripheral Pulses. No clubbing, cyanosis, or edema  NERVOUS SYSTEM:  Alert & Oriented X3, speech clear. Cooperative with the exam. No acute deficits   SKIN: No rashes or lesions    LABS:                        11.8   11.39 )-----------( 188      ( 2021 09:32 )             36.9     CBC Full  -  ( 2021 09:32 )  WBC Count : 11.39 K/uL  Hemoglobin : 11.8 g/dL  Hematocrit : 36.9 %  Platelet Count - Automated : 188 K/uL  Mean Cell Volume : 98.7 fl  Mean Cell Hemoglobin : 31.6 pg  Mean Cell Hemoglobin Concentration : 32.0 gm/dL  Auto Neutrophil # : 9.12 K/uL  Auto Lymphocyte # : 1.21 K/uL  Auto Monocyte # : 0.91 K/uL  Auto Eosinophil # : 0.08 K/uL  Auto Basophil # : 0.02 K/uL  Auto Neutrophil % : 80.1 %  Auto Lymphocyte % : 10.6 %  Auto Monocyte % : 8.0 %  Auto Eosinophil % : 0.7 %  Auto Basophil % : 0.2 %    2021 09:32    141    |  107    |  18     ----------------------------<  164    3.8     |  27     |  0.85     Ca    9.0        2021 09:32  Phos  4.1       2021 09:32  Mg     2.2       2021 09:32    TPro  7.2    /  Alb  2.3    /  TBili  0.6    /  DBili  x      /  AST  71     /  ALT  99     /  AlkPhos  66     2021 09:32    PT/INR - ( 2021 18:14 )   PT: 13.1 sec;   INR: 1.13 ratio         PTT - ( 2021 18:14 )  PTT:27.9 sec  Urinalysis Basic - ( 2021 19:21 )    Color: Yellow / Appearance: Clear / S.020 / pH: x  Gluc: x / Ketone: Negative  / Bili: Negative / Urobili: 4   Blood: x / Protein: 30 mg/dL / Nitrite: Negative   Leuk Esterase: Trace / RBC: 0-2 /HPF / WBC 3-5   Sq Epi: x / Non Sq Epi: Few / Bacteria: x          Culture - Blood (collected 21 @ 00:42)  Source: .Blood Blood-Peripheral  Preliminary Report (21 @ 01:02):    No growth to date.    Culture - Blood (collected 21 @ 00:42)  Source: .Blood Blood-Peripheral  Gram Stain (21 @ 14:04):    Growth in aerobic bottle: Gram Negative Rods  Preliminary Report (21 @ 14:04):    Growth in aerobic bottle: Gram Negative Rods    ***Blood Panel PCR results on this specimen are available    approximately 3 hours after the Gram stain result.***    Gram stain, PCR, and/or culture results may not always    correspond due to difference in methodologies.    ************************************************************    This PCR assay was performed by multiplex PCR. This    Assay tests for 66 bacterial and resistance gene targets.    Please refer to the [facility] test directory    at [email or web address] for details.  Organism: Blood Culture PCR (21 @ 16:27)  Organism: Blood Culture PCR (21 @ 16:27)      -  Salmonella species: Detec      Method Type: PCR    Culture - Urine (collected 21 @ 00:40)  Source: Clean Catch Clean Catch (Midstream)  Final Report (21 @ 22:27):    <10,000 CFU/mL Normal Urogenital Katie          Personally reviewed.     Consultant(s) Notes Reviewed:  [x] YES  [ ] NO

## 2021-11-06 NOTE — PROGRESS NOTE ADULT - ASSESSMENT
75 yo M with PMH BPH, GERD. DM, COPD, CAD, seizures, brain hemagioma s/p laser treatment in 2008, and myasthenia gravis presents to the ED with diarrhea and global joint/muscle pain. Admitted for infectious workup. Consider reative arthritis given recent diarrhea. Consider rheum workup given global joint pain, joint warmth.       Problem/Plan - 1:  ·  Problem: Bacteremia  ·  Plan: Patient presenting with leukocytosis, hypoxia, low appetite and weakness.  - CT AP + CT Chest: No evidence of pneumonia. No intra-abdominal pathology.  - No hx of recent abx use or prior C-diff infection. Had diarrhea, but resolved.  - Leukocytosis likely 2/2 to PO steroid use. Uptrend today 11.39 from 10.32 yesterday   - Blood culture positive for Salmonella.   - Rocephin 2gr QD today by ID   - Yesterday, Lactic acid negative and positive procalcitonin 0.05  - urine cx negative.   - RVP - negative  - New set of blood cultures ordered today.   - TTE  - F/u Legionella  - ID Dr. Phillip consulted, f/u recs.     Problem/Plan - 2:  ·  Problem: Joint/muscle pain.   ·  Plan: Pt complains of 10/10 muscle pain and weakness since last admission, in the setting of fever, diarrhea, abdominal pain and decreased PO intake.  UA shows elevated urine urobilinogen, mildly elevated AST, elevated lactate. Suspect 2/2 to decreased PO intake leading to myopathy.  - HOLD statin in setting of myopathy.  - Pain regimen: Tylenol for mild pain. Tramadol 25 for moderate  - Continue PO prednisone 2.5mg QD  - CK - wnl  - Given recent hx of diarrhea, consider reactive arthritis  - Consider rheum workup given global joint pain, joint warmth.     Problem/Plan - 3:  ·  Problem: Myasthenia gravis.   ·  Plan: Chronic, stable  - Continue Mestinon 60 mg Q4 hrs  - Follows with Neuro Dr. Bonilla  - Neurology Consulted Dr Bonilla; recs appreciated.     Problem/Plan - 4:  ·  Problem: COPD without exacerbation.   ·  Plan: chronic, stable  88% on admission on RA, 92% on RA while examining  - continue prednisone 2.5mg qd, advair diskus 250-50, Duonebs Q6  - Monitor O2sat.     Problem/Plan - 5:  ·  Problem: Hyponatremia.   ·  Plan: Pt found to be hyponatremic on previous admission.  Sent home on Sodium Chloride 1g tabs QD  - Na - 140, continue holding  - Monitor daily CMP.     Problem/Plan - 6:  ·  Problem: Seizures.   ·  Plan: - continue oxcarbazpine 300 mg tid  -seizure precautions.     Problem/Plan - 7:  ·  Problem: HTN (hypertension).   ·  Plan: HTN  Chronic  - BP controlled here  - continue lisinopril 2.5 qd with hold parameters.     Problem/Plan - 8:  ·  Problem: HLD (hyperlipidemia).   ·  Plan: chronic, stable  - continue holding atorvastatin in setting of muscle pain     Problem/Plan - 9:  ·  Problem: CAD (coronary artery disease).   ·  Plan: - chronic, stable  -not on any home medications.   -f/u outpatient with PCP.     Problem/Plan - 10:  ·  Problem: DM (diabetes mellitus).   ·  Plan; - chronic, stable  - glu 143 on admission  - not on home medication  - hypoglycemia protocol ordered. If FS running high (>200) can start on sliding scale coverage  - daily CMPs.     Problem/Plan - 11:  ·  Problem: BPH (benign prostatic hyperplasia).   ·  Plan: chronic, stable.     Problem/Plan - 12:  ·  Problem: GERD (gastroesophageal reflux disease).   ·  Plan: - chronic, stable.     Problem/Plan - 13:  ·  Problem: DVT ppx  ·  Plan: Lovenox 40mg subq, qd   77 yo M with PMH BPH, GERD. DM, COPD, CAD, seizures, brain hemagioma s/p laser treatment in 2008, and myasthenia gravis presents to the ED with diarrhea and global joint/muscle pain. Admitted for infectious workup. Consider reative arthritis given recent diarrhea. Consider rheum workup given global joint pain, joint warmth.       Problem/Plan - 1:  ·  Problem: Bacteremia  ·  Plan: Patient presenting with leukocytosis, hypoxia, low appetite and weakness.  - CT AP + CT Chest: No evidence of pneumonia. No intra-abdominal pathology.  - No hx of recent abx use or prior C-diff infection. Had diarrhea, but resolved.  - Lactic acid negative and positive procalcitonin 0.05  - Leukocytosis uptrend today 11.39 from 10.32 yesterday. Pt using PO steroids.   - LFTs uptrending, normal in previous admission. ALT 99 and AST 71   - Blood culture positive for Salmonella.   - Rocephin 2gr QD today by ID and Dc zosyn.   - Urine cx negative.   - RVP - negative  - New set of blood cultures ordered today.   - TTE ordered  - RUQ sonogram ordered.   - Monitor and trend LFTs, WBC ansd temp.   - F/u Legionella  - ID Dr. Phillip following     Problem/Plan - 2:  ·  Problem: Joint/muscle pain.   ·  Plan: Pt complains of 10/10 muscle pain and weakness since last admission, in the setting of fever, diarrhea, abdominal pain and decreased PO intake.  - UA shows elevated urine urobilinogen, mildly elevated AST, elevated lactate. Suspect 2/2 to decreased PO intake leading to myopathy; consider reactive arthritis too.   - HOLD statin in setting of myopathy.  - R knee xray: Tricompartment degenerative changes most pronounced in the medial compartment. A small suprapatellar effusion. No acute fracture. No dislocation.  - Pain regimen: Tylenol for mild pain. Tramadol 25 for moderate  - Continue PO prednisone 2.5mg QD  - CK normal. RF borderline increased, normal uric acid. Positive  and ESR 94.   - Pending anti-DNAs, HLA     Problem/Plan - 3:  ·  Problem: Myasthenia gravis.   ·  Plan: Chronic, stable  - Continue Mestinon 60 mg Q4 hrs  - Follows with Neuro Dr. Bonilla  - Neurology Consulted Dr Bonilla; recs appreciated.     Problem/Plan - 4:  ·  Problem: COPD without exacerbation.   ·  Plan: chronic, stable  88% on admission on RA, 92% on RA while examining  - continue prednisone 2.5mg qd, advair diskus 250-50, Duonebs Q6  - Monitor O2sat.     Problem/Plan - 5:  ·  Problem: Hyponatremia.  Resolved.   ·  Plan: Pt found to be hyponatremic on previous admission.  - Sent home on Sodium Chloride 1g tabs QD  - Na - 140 continue holding  - Monitor daily CMP.     Problem/Plan - 6:  ·  Problem: Seizures.   ·  Plan: - continue oxcarbazpine 300 mg tid  -seizure precautions.     Problem/Plan - 7:  ·  Problem: HTN (hypertension).   ·  Plan: HTN  Chronic  - BP controlled here  - continue lisinopril 2.5 qd with hold parameters.     Problem/Plan - 8:  ·  Problem: HLD (hyperlipidemia).   ·  Plan: chronic, stable  - continue holding atorvastatin in setting of muscle pain     Problem/Plan - 9:  ·  Problem: CAD (coronary artery disease).   ·  Plan: - chronic, stable  -not on any home medications.   -f/u outpatient with PCP.     Problem/Plan - 10:  ·  Problem: DM (diabetes mellitus).   ·  Plan; - chronic, stable  - glu 143 on admission  - not on home medication  - hypoglycemia protocol ordered. If FS running high (>200) can start on sliding scale coverage  - daily CMPs.     Problem/Plan - 11:  ·  Problem: BPH (benign prostatic hyperplasia).   ·  Plan: chronic, stable.     Problem/Plan - 12:  ·  Problem: GERD (gastroesophageal reflux disease).   ·  Plan: - chronic, stable.     Problem/Plan - 13:  ·  Problem: DVT ppx  ·  Plan: Lovenox 40mg subq, qd   75 yo M with PMH BPH, GERD. DM, COPD, CAD, seizures, brain hemagioma s/p laser treatment in 2008, and myasthenia gravis presents to the ED with diarrhea and global joint/muscle pain. Admitted for infectious workup. Consider reative arthritis given recent diarrhea. Consider rheum workup given global joint pain, joint warmth.       Problem/Plan - 1:  ·  Problem: Bacteremia  ·  Plan: Patient presenting with leukocytosis, hypoxia, low appetite and weakness.  - CT AP + CT Chest: No evidence of pneumonia. No intra-abdominal pathology.  - No hx of recent abx use or prior C-diff infection. Had diarrhea, but resolved.  - Lactic acid negative and positive procalcitonin 0.05  - Leukocytosis uptrend today 11.39 from 10.32 yesterday. Pt using PO steroids.   - LFTs uptrending, normal in previous admission. ALT 99 and AST 71   - Blood culture positive for Salmonella.   - Rocephin 2gr QD today by ID and Dc zosyn.   - Urine cx negative.   - RVP - negative  - New set of blood cultures ordered today.   - TTE ordered  - RUQ sonogram ordered.   - PT eval: home w/ assist; vs CHEPE  - Monitor and trend LFTs, WBC ansd temp.   - F/u Legionella  - ID Dr. Phillip following     Problem/Plan - 2:  ·  Problem: Joint/muscle pain.   ·  Plan: Pt complains of 10/10 muscle pain and weakness since last admission, in the setting of fever, diarrhea, abdominal pain and decreased PO intake.  - UA shows elevated urine urobilinogen, mildly elevated AST, elevated lactate. Suspect 2/2 to decreased PO intake leading to myopathy; consider reactive arthritis too.   - HOLD statin in setting of myopathy.  - R knee xray: Tricompartment degenerative changes most pronounced in the medial compartment. A small suprapatellar effusion. No acute fracture. No dislocation.  - Pain regimen: Tylenol for mild pain. Tramadol 25 for moderate  - Continue PO prednisone 2.5mg QD  - CK normal. RF borderline increased, normal uric acid. Positive  and ESR 94.   - Pending anti-DNAs, HLA     Problem/Plan - 3:  ·  Problem: Myasthenia gravis.   ·  Plan: Chronic, stable  - Continue Mestinon 60 mg Q4 hrs  - Swallow eval: regular solids, thin liquids via small/single sips + aspiration precautions  - Follows with Neuro Dr. Bonilla  - Neurology Consulted Dr Bonilla; recs appreciated.     Problem/Plan - 4:  ·  Problem: COPD without exacerbation.   ·  Plan: chronic, stable  88% on admission on RA, 92% on RA while examining  - continue prednisone 2.5mg qd, advair diskus 250-50, Duonebs Q6  - Monitor O2sat.     Problem/Plan - 5:  ·  Problem: Hyponatremia.  Resolved.   ·  Plan: Pt found to be hyponatremic on previous admission.  - Sent home on Sodium Chloride 1g tabs QD  - Na - 140 continue holding  - Monitor daily CMP.     Problem/Plan - 6:  ·  Problem: Seizures.   ·  Plan: - continue oxcarbazpine 300 mg tid  -seizure precautions.     Problem/Plan - 7:  ·  Problem: HTN (hypertension).   ·  Plan: HTN  Chronic  - BP controlled here  - continue lisinopril 2.5 qd with hold parameters.     Problem/Plan - 8:  ·  Problem: HLD (hyperlipidemia).   ·  Plan: chronic, stable  - continue holding atorvastatin in setting of muscle pain     Problem/Plan - 9:  ·  Problem: CAD (coronary artery disease).   ·  Plan: - chronic, stable  -not on any home medications.   -f/u outpatient with PCP.     Problem/Plan - 10:  ·  Problem: DM (diabetes mellitus).   ·  Plan; - chronic, stable  - glu 143 on admission  - not on home medication  - hypoglycemia protocol ordered. If FS running high (>200) can start on sliding scale coverage  - daily CMPs.     Problem/Plan - 11:  ·  Problem: BPH (benign prostatic hyperplasia).   ·  Plan: chronic, stable.     Problem/Plan - 12:  ·  Problem: GERD (gastroesophageal reflux disease).   ·  Plan: - chronic, stable.     Problem/Plan - 13:  ·  Problem: DVT ppx  ·  Plan: Lovenox 40mg subq, qd   77 yo M with PMH BPH, GERD. DM, COPD, CAD, seizures, brain hemagioma s/p laser treatment in 2008, and myasthenia gravis presents to the ED with diarrhea and global joint/muscle pain admitted with bacteremia sec to salmonella       Problem/Plan - 1:  ·  Problem: Bacteremia  ·  Plan: Patient presenting with leukocytosis, hypoxia, low appetite and weakness.  - CT AP + CT Chest: No evidence of pneumonia. No intra-abdominal pathology.  - No hx of recent abx use or prior C-diff infection. Had diarrhea, but resolved.  - Lactic acid negative and positive procalcitonin 0.05  - Leukocytosis uptrend today 11.39 from 10.32 yesterday. Pt using PO steroids.   - LFTs uptrending, normal in previous admission. ALT 99 and AST 71   - Blood culture positive for Salmonella.   - Rocephin 2gr QD today by ID and Dc zosyn.   - Urine cx negative.   - RVP - negative  - New set of blood cultures ordered today.   - TTE ordered  - RUQ sonogram ordered.   - PT eval: home w/ assist; vs CHEPE  - Monitor and trend LFTs, WBC ansd temp.   - F/u Legionella  - ID Dr. Phillip following     Problem/Plan - 2:  ·  Problem: Joint/muscle pain.   ·  Plan: resolving, pain is now improved likely arthritic, poss reactive  - UA shows elevated urine urobilinogen, mildly elevated AST, elevated lactate. Suspect 2/2 to decreased PO intake leading to myopathy; consider reactive arthritis too.   - HOLD statin in setting of myopathy.  - R knee xray: Tricompartment degenerative changes most pronounced in the medial compartment. A small suprapatellar effusion. No acute fracture. No dislocation.  - Pain regimen: Tylenol for mild pain. Tramadol 25 for moderate  - Continue PO prednisone 2.5mg QD  - CK normal. RF borderline increased, normal uric acid. Positive  and ESR 94.   - Pending anti-DNAs, HLA     Problem/Plan - 3:  ·  Problem: Myasthenia gravis.   ·  Plan: Chronic, stable  - Continue Mestinon 60 mg Q4 hrs  - Swallow eval: regular solids, thin liquids via small/single sips + aspiration precautions  - Follows with Neuro Dr. Bonilla  - Neurology Consulted Dr Bonilla; recs appreciated.     Problem/Plan - 4:  ·  Problem: COPD without exacerbation.   ·  Plan: chronic, stable  88% on admission on RA, 92% on RA while examining  - continue prednisone 2.5mg qd, advair diskus 250-50, Duonebs Q6  - Monitor O2sat.     Problem/Plan - 5:  ·  Problem: Hyponatremia.  Resolved.   ·  Plan: Pt found to be hyponatremic on previous admission.  - Sent home on Sodium Chloride 1g tabs QD  - Na - 140 continue holding  - Monitor daily CMP.     Problem/Plan - 6:  ·  Problem: Seizures.   ·  Plan: - continue oxcarbazpine 300 mg tid  -seizure precautions.     Problem/Plan - 7:  ·  Problem: HTN (hypertension).   ·  Plan: HTN  Chronic  - BP controlled here  - continue lisinopril 2.5 qd with hold parameters.     Problem/Plan - 8:  ·  Problem: HLD (hyperlipidemia).   ·  Plan: chronic, stable  - continue holding atorvastatin in setting of muscle pain     Problem/Plan - 9:  ·  Problem: CAD (coronary artery disease).   ·  Plan: - chronic, stable  -not on any home medications.   -f/u outpatient with PCP.     Problem/Plan - 10:  ·  Problem: DM (diabetes mellitus).   ·  Plan; - chronic, stable  - glu 143 on admission  - not on home medication  - hypoglycemia protocol ordered. If FS running high (>200) can start on sliding scale coverage  - daily CMPs.     Problem/Plan - 11:  ·  Problem: BPH (benign prostatic hyperplasia).   ·  Plan: chronic, stable.     Problem/Plan - 12:  ·  Problem: GERD (gastroesophageal reflux disease).   ·  Plan: - chronic, stable.     Problem/Plan - 13:  ·  Problem: DVT ppx  ·  Plan: Lovenox 40mg subq, qd

## 2021-11-06 NOTE — CONSULT NOTE ADULT - SUBJECTIVE AND OBJECTIVE BOX
RHEUMATOLOGY CONSULT- ELIE ADRIANA CARLOS MD, FACR [phone]      Patient is a 76y old  Male who presents with a chief complaint of Fever (2021 16:57)    HPI:  77 yo M with PMH BPH, GERD. DM, COPD, CAD, seizures, brain hemagioma s/p laser treatment in , and myasthenia gravis presents to the ED with diarrhea. Of note patient was hospitalized recently at [facility] 10/18-10/21 for MG exacerbation and received 3 days of IVIG. On day after admission, pt developed 1 bout of watery diarrhea which self-resolved, however daughter believes 2/2 decreased PO intake and appetite. He also reports 2 days after admission, he developed muscle pain and weakness located in quadriceps area, 10/10, at rest and with ambulation. Pt states muscle weakness has been so severe he hasn't been able to shave. ROS is positive for fever, abdominal pain, 1 episode of diarrhea (now resolved), decreased appetite, chronic cough, muscle weakness, muscle pain. ROS is negative for chills, n/v, SOB, cp, palpitations, changes in vision or hearing.    found to have Salmonella bacteremia, responding to ceftriaxone,  states that he is a former , used to pain, this was severe,  denies any hx of gout or RA    In the ED  VS: T101.9 rectal, HR 89, /73, RR 18 SPO2 88% on RA  Labs: WBC 12.85, HH 12.7/38.4, PLTS 183, Na 137, K 4.1, Cr 0.97, Gluc 143, albumin 2.6, lactate 2.3, hs troponin negative x1,   Chest Xray: no acute lung pathology (personal read)  CT chest: pending  CT A/P: pending  EKG: Normal Sinus Rhythm Normal ECG  Given in ED: acetaminophen 650mg PO x1, zosyn, vancomycin 1gm, NS 1.850 L bolus x1 (2021 18:53)    Additional Information:    REVIEW OF SYSTEMS:  All Review of systems refer to adm note, reviewed  Musculoskeletal		Normal: +  joint pain, cramps, weakness, myalgias  .			  MEDICATIONS  (STANDING):  budesonide 160 MICROgram(s)/formoterol 4.5 MICROgram(s) Inhaler 2 Puff(s) Inhalation two times a day  cefTRIAXone   IVPB 2000 milliGRAM(s) IV Intermittent every 24 hours  enoxaparin Injectable 40 milliGRAM(s) SubCutaneous daily  lisinopril 2.5 milliGRAM(s) Oral daily  OXcarbazepine 300 milliGRAM(s) Oral <User Schedule>  predniSONE   Tablet 2.5 milliGRAM(s) Oral daily  pyridostigmine 60 milliGRAM(s) Oral every 4 hours  saccharomyces boulardii 250 milliGRAM(s) Oral two times a day  sodium chloride 0.9%. 1000 milliLiter(s) (50 mL/Hr) IV Continuous <Continuous>    MEDICATIONS  (PRN):  acetaminophen     Tablet .. 650 milliGRAM(s) Oral every 6 hours PRN Temp greater or equal to 38C (100.4F), Mild Pain (1 - 3)  ALBUTerol    90 MICROgram(s) HFA Inhaler 2 Puff(s) Inhalation every 6 hours PRN Shortness of Breath and/or Wheezing  aluminum hydroxide/magnesium hydroxide/simethicone Suspension 30 milliLiter(s) Oral every 4 hours PRN Dyspepsia  melatonin 3 milliGRAM(s) Oral at bedtime PRN Insomnia  ondansetron Injectable 4 milliGRAM(s) IV Push every 8 hours PRN Nausea and/or Vomiting  traMADol 25 milliGRAM(s) Oral every 6 hours PRN Moderate Pain (4 - 6)    Allergies    No Known Allergies    Intolerances      PPD:  Vaccines:    PAST MEDICAL & SURGICAL HISTORY:  CAD (coronary artery disease)    Diabetes mellitus    COPD without exacerbation    Benign prostatic hyperplasia    GERD (gastroesophageal reflux disease)    Myasthenia gravis    Seizures    No significant past surgical history          SOCIAL HISTORY: lives at home, concerned about poor diet due to significant other    Vital Signs Last 24 Hrs  T(C): 37 (2021 15:48), Max: 37.3 (2021 20:13)  T(F): 98.6 (2021 15:48), Max: 99.1 (2021 20:13)  HR: 61 (2021 15:48) (60 - 79)  BP: 134/67 (2021 15:48) (130/73 - 148/72)  BP(mean): --  RR: 17 (2021 15:48) (17 - 18)  SpO2: 92% (2021 15:48) (91% - 93%)  Daily     Daily Weight in k.3 (2021 04:52)    PHYSICAL EXAM:  All physical exam findings normal, except for those marked:  General Appearance:  diffuse OA, rt wrsit with ulnar styloid tenderness no warmth                          knees quiescent, .			no effusions  [+] Global Assessment of Disease Activity (1-10): 3      Lab Results:                        11.8   11.39 )-----------( 188      ( 2021 09:32 )             36.9         141  |  107  |  18  ----------------------------<  164<H>  3.8   |  27  |  0.85    Ca    9.0      2021 09:32  Phos  4.1     11-  Mg     2.2     -    TPro  7.2  /  Alb  2.3<L>  /  TBili  0.6  /  DBili  x   /  AST  71<H>  /  ALT  99<H>  /  AlkPhos  66  -06    CRP increase due to bacteremia

## 2021-11-06 NOTE — PROGRESS NOTE ADULT - ASSESSMENT
77 yo man with PMH of DM, COPD, brain hemangioma s/p laser treatment in 2008, Myasthenia gravis s/p recent admission for MG exacerbation, who presented with fever.     Found to have fever 2/2 salmonella bacteremia, detected in 1/4 blood culture bottles. Fever resolved as well as prior GI symptoms. He has a WBC of 11, would monitor for now. Prior knee pain appears to have improved, no signs of infection/inflammation based on exam.    -recommend ceftriaxone 2g IV q24h  -discontinue Zosyn  -recommend repeat blood cultures  -recommend TTE  -monitor WBC    Justyna Kelly MD  Division of infectious Diseases  Cell [phone] between 8am and 6pm  After 6pm and over the weekends please call ID service line at [phone].

## 2021-11-06 NOTE — CONSULT NOTE ADULT - SUBJECTIVE AND OBJECTIVE BOX
Date/Time Patient Seen:  		  Referring MD:   Data Reviewed	       Patient is a 76y old  Male who presents with a chief complaint of Fever (06 Nov 2021 10:50)      Subjective/HPI     PAST MEDICAL & SURGICAL HISTORY:  CAD (coronary artery disease)    Diabetes mellitus    COPD without exacerbation    Benign prostatic hyperplasia    GERD (gastroesophageal reflux disease)    Myasthenia gravis    Seizures    No significant past surgical history          Medication list         MEDICATIONS  (STANDING):  budesonide 160 MICROgram(s)/formoterol 4.5 MICROgram(s) Inhaler 2 Puff(s) Inhalation two times a day  cefTRIAXone   IVPB 2000 milliGRAM(s) IV Intermittent every 24 hours  enoxaparin Injectable 40 milliGRAM(s) SubCutaneous daily  lisinopril 2.5 milliGRAM(s) Oral daily  OXcarbazepine 300 milliGRAM(s) Oral <User Schedule>  predniSONE   Tablet 2.5 milliGRAM(s) Oral daily  pyridostigmine 60 milliGRAM(s) Oral every 4 hours  saccharomyces boulardii 250 milliGRAM(s) Oral two times a day  sodium chloride 0.9%. 1000 milliLiter(s) (50 mL/Hr) IV Continuous <Continuous>    MEDICATIONS  (PRN):  acetaminophen     Tablet .. 650 milliGRAM(s) Oral every 6 hours PRN Temp greater or equal to 38C (100.4F), Mild Pain (1 - 3)  albuterol/ipratropium for Nebulization 3 milliLiter(s) Nebulizer every 6 hours PRN Shortness of Breath and/or Wheezing  aluminum hydroxide/magnesium hydroxide/simethicone Suspension 30 milliLiter(s) Oral every 4 hours PRN Dyspepsia  melatonin 3 milliGRAM(s) Oral at bedtime PRN Insomnia  ondansetron Injectable 4 milliGRAM(s) IV Push every 8 hours PRN Nausea and/or Vomiting  traMADol 25 milliGRAM(s) Oral every 6 hours PRN Moderate Pain (4 - 6)         Vitals log        ICU Vital Signs Last 24 Hrs  T(C): 36.8 (06 Nov 2021 04:52), Max: 37.3 (05 Nov 2021 20:13)  T(F): 98.3 (06 Nov 2021 04:52), Max: 99.1 (05 Nov 2021 20:13)  HR: 67 (06 Nov 2021 04:52) (67 - 85)  BP: 148/72 (06 Nov 2021 04:52) (129/67 - 148/72)  BP(mean): --  ABP: --  ABP(mean): --  RR: 18 (06 Nov 2021 04:52) (18 - 18)  SpO2: 93% (06 Nov 2021 04:52) (91% - 93%)           Input and Output:  I&O's Detail    05 Nov 2021 07:01  -  06 Nov 2021 07:00  --------------------------------------------------------  IN:  Total IN: 0 mL    OUT:    Voided (mL): 300 mL  Total OUT: 300 mL    Total NET: -300 mL          Lab Data                        11.8   11.39 )-----------( 188      ( 06 Nov 2021 09:32 )             36.9     11-06    141  |  107  |  18  ----------------------------<  164<H>  3.8   |  27  |  0.85    Ca    9.0      06 Nov 2021 09:32  Phos  4.1     11-06  Mg     2.2     11-06    TPro  7.2  /  Alb  2.3<L>  /  TBili  0.6  /  DBili  x   /  AST  71<H>  /  ALT  99<H>  /  AlkPhos  66  11-06      CARDIAC MARKERS ( 04 Nov 2021 21:23 )  x     / x     / 48 U/L / x     / x            Review of Systems	      Objective     Physical Examination        Pertinent Lab findings & Imaging      Ruthie:  NO   Adequate UO     I&O's Detail    05 Nov 2021 07:01  -  06 Nov 2021 07:00  --------------------------------------------------------  IN:  Total IN: 0 mL    OUT:    Voided (mL): 300 mL  Total OUT: 300 mL    Total NET: -300 mL               Discussed with:     Cultures:	        Radiology    EXAM:  CT ABDOMEN AND PELVIS IC                          EXAM:  CT CHEST IC                            PROCEDURE DATE:  11/04/2021          INTERPRETATION:  CLINICAL INFORMATION: Cough. Fever. Diarrhea. Body aches.    COMPARISON: CT chest from July 3, 2019.    CONTRAST/COMPLICATIONS:  IV Contrast: Omnipaque 350   90 cc administered   10 cc discarded  Oral Contrast: NONE  Complications: None reported at time of study completion    PROCEDURE:  CT of the Chest, Abdomen and Pelvis was performed.  Sagittal and coronal reformats were performed.    FINDINGS:  CHEST:  LUNGS AND LARGE AIRWAYS: Emphysema. Small pulmonary nodules, measuring up to 0.5 cm in the right middle lobe (series 2, image 41) and 0.5 cm in the lingula (series 2, 48).  PLEURA: No pleural effusion.  VESSELS: Atherosclerotic changes of the aorta.  HEART: Heart size is normal. No pericardial effusion.  MEDIASTINUM AND ZACARIAS: No lymphadenopathy.  CHEST WALL AND LOWER NECK: Within normal limits.    ABDOMEN AND PELVIS:  LIVER: Within normal limits.  BILE DUCTS: Normal caliber.  GALLBLADDER: Within normal limits.  SPLEEN: Within normal limits.  PANCREAS: Within normal limits.  ADRENALS: Within normal limits.  KIDNEYS/URETERS: No hydronephrosis. Right lower pole renal cyst.    BLADDER: Within normal limits.  REPRODUCTIVE ORGANS: Prostate is enlarged.    BOWEL: No bowel obstruction. Appendix is normal. Colonic diverticulosis.  PERITONEUM: No ascites.  VESSELS: Atherosclerotic changes.  RETROPERITONEUM/LYMPH NODES: No lymphadenopathy.  ABDOMINAL WALL: Within normal limits.  BONES: Degenerative changes.    IMPRESSION:  No evidence of pneumonia.    No intra-abdominal pathology.    --- End of Report ---            STEFAN MARTINEZ MD; Attending Radiologist                           Date/Time Patient Seen:  		  Referring MD:   Data Reviewed	       Patient is a 76y old  Male who presents with a chief complaint of Fever (06 Nov 2021 10:50)      Subjective/HPI  vs noted  echo reviewed  labs reviewed  ct chest reviewed  I and O noted  H and P reviewed  ER provider note reviewed    History of Present Illness:   75 yo M with PMH BPH, GERD. DM, COPD, CAD, seizures, brain hemagioma s/p laser treatment in 2008, and myasthenia gravis presents to the ED with diarrhea. Of note patient was hospitalized recently [facility] 10[facility]18[facility] for MG exacerbation and received 3 days of IVIG. On day after admission, pt developed 1 bout of watery diarrhea which self-resolved, however daughter believes 2/2 decreased PO intake and appetite. He also reports 2 days after admission, he developed muscle pain and weakness located in quadriceps area, 10/10, at rest and with ambulation. Pt states muscle weakness has been so severe he hasn't been able to shave. ROS is positive for fever, abdominal pain, 1 episode of diarrhea (now resolved), decreased appetite, chronic cough, muscle weakness, muscle pain. ROS is negative for chills, n/v, SOB, cp, palpitations, changes in vision or hearing.    PAST SURGICAL HISTORY:  No significant past surgical history.     FAMILY HISTORY:  FHx: stroke.     Social History:  Social History (marital status, living situation, occupation, tobacco use, alcohol and drug use, and sexual history): Pt is , going through divorce. Currently lives at home alone, and is independent with ambulation and ADLs. Denies tobacco, alcohol, or recreational drug use.     Tobacco Screening:  · Core Measure Site	Yes  · Has the patient used tobacco in the past 30 days?	No    Risk Assessment:    Present on Admission:  Deep Venous Thrombosis	no  Pulmonary Embolus	no     Heart Failure:  Does this patient have a history of or has been diagnosed with heart failure? no.     PAST MEDICAL & SURGICAL HISTORY:  CAD (coronary artery disease)    Diabetes mellitus    COPD without exacerbation    Benign prostatic hyperplasia    GERD (gastroesophageal reflux disease)    Myasthenia gravis    Seizures    No significant past surgical history          Medication list         MEDICATIONS  (STANDING):  budesonide 160 MICROgram(s)/formoterol 4.5 MICROgram(s) Inhaler 2 Puff(s) Inhalation two times a day  cefTRIAXone   IVPB 2000 milliGRAM(s) IV Intermittent every 24 hours  enoxaparin Injectable 40 milliGRAM(s) SubCutaneous daily  lisinopril 2.5 milliGRAM(s) Oral daily  OXcarbazepine 300 milliGRAM(s) Oral <User Schedule>  predniSONE   Tablet 2.5 milliGRAM(s) Oral daily  pyridostigmine 60 milliGRAM(s) Oral every 4 hours  saccharomyces boulardii 250 milliGRAM(s) Oral two times a day  sodium chloride 0.9%. 1000 milliLiter(s) (50 mL/Hr) IV Continuous <Continuous>    MEDICATIONS  (PRN):  acetaminophen     Tablet .. 650 milliGRAM(s) Oral every 6 hours PRN Temp greater or equal to 38C (100.4F), Mild Pain (1 - 3)  albuterol/ipratropium for Nebulization 3 milliLiter(s) Nebulizer every 6 hours PRN Shortness of Breath and/or Wheezing  aluminum hydroxide/magnesium hydroxide/simethicone Suspension 30 milliLiter(s) Oral every 4 hours PRN Dyspepsia  melatonin 3 milliGRAM(s) Oral at bedtime PRN Insomnia  ondansetron Injectable 4 milliGRAM(s) IV Push every 8 hours PRN Nausea and/or Vomiting  traMADol 25 milliGRAM(s) Oral every 6 hours PRN Moderate Pain (4 - 6)         Vitals log        ICU Vital Signs Last 24 Hrs  T(C): 36.8 (06 Nov 2021 04:52), Max: 37.3 (05 Nov 2021 20:13)  T(F): 98.3 (06 Nov 2021 04:52), Max: 99.1 (05 Nov 2021 20:13)  HR: 67 (06 Nov 2021 04:52) (67 - 85)  BP: 148/72 (06 Nov 2021 04:52) (129/67 - 148/72)  BP(mean): --  ABP: --  ABP(mean): --  RR: 18 (06 Nov 2021 04:52) (18 - 18)  SpO2: 93% (06 Nov 2021 04:52) (91% - 93%)           Input and Output:  I&O's Detail    05 Nov 2021 07:01  -  06 Nov 2021 07:00  --------------------------------------------------------  IN:  Total IN: 0 mL    OUT:    Voided (mL): 300 mL  Total OUT: 300 mL    Total NET: -300 mL          Lab Data                        11.8   11.39 )-----------( 188      ( 06 Nov 2021 09:32 )             36.9     11-06    141  |  107  |  18  ----------------------------<  164<H>  3.8   |  27  |  0.85    Ca    9.0      06 Nov 2021 09:32  Phos  4.1     11-06  Mg     2.2     11-06    TPro  7.2  /  Alb  2.3<L>  /  TBili  0.6  /  DBili  x   /  AST  71<H>  /  ALT  99<H>  /  AlkPhos  66  11-06      CARDIAC MARKERS ( 04 Nov 2021 21:23 )  x     / x     / 48 U/L / x     / x            Review of Systems	  weakness  depressed      Objective     Physical Examination  heart s1s2  lung dc  BS  abd soft  head nc  verbal        Pertinent Lab findings & Imaging      Hendricks:  NO   Adequate UO     I&O's Detail    05 Nov 2021 07:01  -  06 Nov 2021 07:00  --------------------------------------------------------  IN:  Total IN: 0 mL    OUT:    Voided (mL): 300 mL  Total OUT: 300 mL    Total NET: -300 mL               Discussed with:     Cultures:	        Radiology    EXAM:  CT ABDOMEN AND PELVIS IC                          EXAM:  CT CHEST IC                            PROCEDURE DATE:  11/04/2021          INTERPRETATION:  CLINICAL INFORMATION: Cough. Fever. Diarrhea. Body aches.    COMPARISON: CT chest from July 3, 2019.    CONTRAST/COMPLICATIONS:  IV Contrast: Omnipaque 350   90 cc administered   10 cc discarded  Oral Contrast: NONE  Complications: None reported at time of study completion    PROCEDURE:  CT of the Chest, Abdomen and Pelvis was performed.  Sagittal and coronal reformats were performed.    FINDINGS:  CHEST:  LUNGS AND LARGE AIRWAYS: Emphysema. Small pulmonary nodules, measuring up to 0.5 cm in the right middle lobe (series 2, image 41) and 0.5 cm in the lingula (series 2, 48).  PLEURA: No pleural effusion.  VESSELS: Atherosclerotic changes of the aorta.  HEART: Heart size is normal. No pericardial effusion.  MEDIASTINUM AND ZACARIAS: No lymphadenopathy.  CHEST WALL AND LOWER NECK: Within normal limits.    ABDOMEN AND PELVIS:  LIVER: Within normal limits.  BILE DUCTS: Normal caliber.  GALLBLADDER: Within normal limits.  SPLEEN: Within normal limits.  PANCREAS: Within normal limits.  ADRENALS: Within normal limits.  KIDNEYS/URETERS: No hydronephrosis. Right lower pole renal cyst.    BLADDER: Within normal limits.  REPRODUCTIVE ORGANS: Prostate is enlarged.    BOWEL: No bowel obstruction. Appendix is normal. Colonic diverticulosis.  PERITONEUM: No ascites.  VESSELS: Atherosclerotic changes.  RETROPERITONEUM/LYMPH NODES: No lymphadenopathy.  ABDOMINAL WALL: Within normal limits.  BONES: Degenerative changes.    IMPRESSION:  No evidence of pneumonia.    No intra-abdominal pathology.    --- End of Report ---            STEFAN MARTINEZ MD; Attending Radiologist

## 2021-11-06 NOTE — PROGRESS NOTE ADULT - SUBJECTIVE AND OBJECTIVE BOX
Neurology Follow up note    JACKIE LÓPEZ76yMale    HPI:  75 yo M with PMH BPH, GERD. DM, COPD, CAD, seizures, brain hemagioma s/p laser treatment in , and myasthenia gravis presents to the ED with diarrhea. Of note patient was hospitalized recently at [facility] 10/18-10/21 for MG exacerbation and received 3 days of IVIG. On day after admission, pt developed 1 bout of watery diarrhea which self-resolved, however daughter believes 2/2 decreased PO intake and appetite. He also reports 2 days after admission, he developed muscle pain and weakness located in quadriceps area, 10/10, at rest and with ambulation. Pt states muscle weakness has been so severe he hasn't been able to shave. ROS is positive for fever, abdominal pain, 1 episode of diarrhea (now resolved), decreased appetite, chronic cough, muscle weakness, muscle pain. ROS is negative for chills, n/v, SOB, cp, palpitations, changes in vision or hearing.    In the ED  VS: T101.9 rectal, HR 89, /73, RR 18 SPO2 88% on RA  Labs: WBC 12.85, HH 12.7/38.4, PLTS 183, Na 137, K 4.1, Cr 0.97, Gluc 143, albumin 2.6, lactate 2.3, hs troponin negative x1,   Chest Xray: no acute lung pathology (personal read)  CT chest: pending  CT A/P: pending  EKG: Normal Sinus Rhythm Normal ECG  Given in ED: acetaminophen 650mg PO x1, zosyn, vancomycin 1gm, NS 1.850 L bolus x1 (2021 18:53)      Interval History -afebrile    Patient is seen, chart was reviewed and case was discussed with the treatment team.  Pt is not in any distress.   Lying on bed comfortably.       Vital Signs Last 24 Hrs  T(C): 37 (2021 15:48), Max: 37.3 (2021 20:13)  T(F): 98.6 (2021 15:48), Max: 99.1 (2021 20:13)  HR: 61 (2021 15:48) (60 - 79)  BP: 134/67 (2021 15:48) (130/73 - 148/72)  BP(mean): --  RR: 17 (2021 15:48) (17 - 18)  SpO2: 92% (2021 15:48) (91% - 93%)        REVIEW OF SYSTEMS:    Constitutional: No fever, weight loss or fatigue  Eyes: No eye pain, visual disturbances, or discharge  ENT:  No difficulty hearing, tinnitus, vertigo; No sinus or throat pain  Neck: No pain or stiffness  Respiratory: No cough, wheezing, chills or hemoptysis  Cardiovascular: No chest pain, palpitations, shortness of breath, dizziness or leg swelling  Gastrointestinal: No abdominal or epigastric pain. No nausea, vomiting or hematemesis  Genitourinary: No dysuria, frequency, hematuria or incontinence  Neurological: No headaches, memory loss, l  Psychiatric: No depression, anxiety, mood swings or difficulty sleeping  Musculoskeletal: No joint pain or swelling;   Skin: No itching, burning, rashes or lesions   Lymph Nodes: No enlarged glands  Endocrine: No heat or cold intolerance; No hair loss,  Allergy and Immunologic: No hives or eczema    On Neurological Examination:    Mental Status - Pt is alert, awake, oriented X3.. Follows commands well and able to answer questions appropriately  .Mood and affect  normal    Speech -  Normal      Cranial Nerves - Pupils 3 mm equal and reactive to light, mild ptosis. Pt has no visual field deficit.  Pt has no  facial asymmetry. Facial sensation is intact.  Tongue - is in midline.    Muscle tone - is normal    Motor Exam - 4+/5 all over,   No drift. No shaking or tremors.    Sensory Exam -Pt withdraws all extremities equally on stimulation. No asymmetry seen. No complaints of tingling, numbness.      coordination:    Finger to nose: normal    Deep tendon Reflexes - 2 plus all over.          Neck Supple -  Yes.     MEDICATIONS    acetaminophen     Tablet .. 650 milliGRAM(s) Oral every 6 hours PRN  ALBUTerol    90 MICROgram(s) HFA Inhaler 2 Puff(s) Inhalation every 6 hours PRN  aluminum hydroxide/magnesium hydroxide/simethicone Suspension 30 milliLiter(s) Oral every 4 hours PRN  budesonide 160 MICROgram(s)/formoterol 4.5 MICROgram(s) Inhaler 2 Puff(s) Inhalation two times a day  cefTRIAXone   IVPB 2000 milliGRAM(s) IV Intermittent every 24 hours  enoxaparin Injectable 40 milliGRAM(s) SubCutaneous daily  lisinopril 2.5 milliGRAM(s) Oral daily  melatonin 3 milliGRAM(s) Oral at bedtime PRN  ondansetron Injectable 4 milliGRAM(s) IV Push every 8 hours PRN  OXcarbazepine 300 milliGRAM(s) Oral <User Schedule>  predniSONE   Tablet 2.5 milliGRAM(s) Oral daily  pyridostigmine 60 milliGRAM(s) Oral every 4 hours  saccharomyces boulardii 250 milliGRAM(s) Oral two times a day  sodium chloride 0.9%. 1000 milliLiter(s) IV Continuous <Continuous>  traMADol 25 milliGRAM(s) Oral every 6 hours PRN      Allergies    No Known Allergies    Intolerances        LABS:  CBC Full  -  ( 2021 09:32 )  WBC Count : 11.39 K/uL  RBC Count : 3.74 M/uL  Hemoglobin : 11.8 g/dL  Hematocrit : 36.9 %  Platelet Count - Automated : 188 K/uL  Mean Cell Volume : 98.7 fl  Mean Cell Hemoglobin : 31.6 pg  Mean Cell Hemoglobin Concentration : 32.0 gm/dL  Auto Neutrophil # : 9.12 K/uL  Auto Lymphocyte # : 1.21 K/uL  Auto Monocyte # : 0.91 K/uL  Auto Eosinophil # : 0.08 K/uL  Auto Basophil # : 0.02 K/uL  Auto Neutrophil % : 80.1 %  Auto Lymphocyte % : 10.6 %  Auto Monocyte % : 8.0 %  Auto Eosinophil % : 0.7 %  Auto Basophil % : 0.2 %    Urinalysis Basic - ( 2021 19:21 )    Color: Yellow / Appearance: Clear / S.020 / pH: x  Gluc: x / Ketone: Negative  / Bili: Negative / Urobili: 4   Blood: x / Protein: 30 mg/dL / Nitrite: Negative   Leuk Esterase: Trace / RBC: 0-2 /HPF / WBC 3-5   Sq Epi: x / Non Sq Epi: Few / Bacteria: x      -    141  |  107  |  18  ----------------------------<  164<H>  3.8   |  27  |  0.85    Ca    9.0      2021 09:32  Phos  4.1     11-  Mg     2.2     11-06    TPro  7.2  /  Alb  2.3<L>  /  TBili  0.6  /  DBili  x   /  AST  71<H>  /  ALT  99<H>  /  AlkPhos  66  11-06    Hemoglobin A1C:     Vitamin B12     RADIOLOGY    ASSESSMENT AND PLAN:      presented weakness; fever  related salmonella bact  MG -stable    continue mestinone  Physical therapy evaluation.  OOB to chair/ambulation with assistance only.  Pain is accessed and addressed.  Plan of care was discussed with family. Questions answered.  Would continue to follow.

## 2021-11-07 LAB
-  AMPICILLIN: SIGNIFICANT CHANGE UP
-  CEFTRIAXONE: SIGNIFICANT CHANGE UP
-  CIPROFLOXACIN: SIGNIFICANT CHANGE UP
-  CIPROFLOXACIN: SIGNIFICANT CHANGE UP
-  TRIMETHOPRIM/SULFAMETHOXAZOLE: SIGNIFICANT CHANGE UP
ALBUMIN SERPL ELPH-MCNC: 2.3 G/DL — LOW (ref 3.3–5)
ALP SERPL-CCNC: 75 U/L — SIGNIFICANT CHANGE UP (ref 40–120)
ALT FLD-CCNC: 173 U/L — HIGH (ref 12–78)
ANION GAP SERPL CALC-SCNC: 6 MMOL/L — SIGNIFICANT CHANGE UP (ref 5–17)
AST SERPL-CCNC: 136 U/L — HIGH (ref 15–37)
BASOPHILS # BLD AUTO: 0.02 K/UL — SIGNIFICANT CHANGE UP (ref 0–0.2)
BASOPHILS NFR BLD AUTO: 0.2 % — SIGNIFICANT CHANGE UP (ref 0–2)
BILIRUB SERPL-MCNC: 0.5 MG/DL — SIGNIFICANT CHANGE UP (ref 0.2–1.2)
BUN SERPL-MCNC: 21 MG/DL — SIGNIFICANT CHANGE UP (ref 7–23)
CALCIUM SERPL-MCNC: 9 MG/DL — SIGNIFICANT CHANGE UP (ref 8.5–10.1)
CHLORIDE SERPL-SCNC: 107 MMOL/L — SIGNIFICANT CHANGE UP (ref 96–108)
CO2 SERPL-SCNC: 28 MMOL/L — SIGNIFICANT CHANGE UP (ref 22–31)
CREAT SERPL-MCNC: 0.95 MG/DL — SIGNIFICANT CHANGE UP (ref 0.5–1.3)
CULTURE RESULTS: SIGNIFICANT CHANGE UP
EOSINOPHIL # BLD AUTO: 0.17 K/UL — SIGNIFICANT CHANGE UP (ref 0–0.5)
EOSINOPHIL NFR BLD AUTO: 1.6 % — SIGNIFICANT CHANGE UP (ref 0–6)
GLUCOSE SERPL-MCNC: 126 MG/DL — HIGH (ref 70–99)
HCT VFR BLD CALC: 37.9 % — LOW (ref 39–50)
HGB BLD-MCNC: 12.2 G/DL — LOW (ref 13–17)
IMM GRANULOCYTES NFR BLD AUTO: 0.5 % — SIGNIFICANT CHANGE UP (ref 0–1.5)
LYMPHOCYTES # BLD AUTO: 1.41 K/UL — SIGNIFICANT CHANGE UP (ref 1–3.3)
LYMPHOCYTES # BLD AUTO: 13.3 % — SIGNIFICANT CHANGE UP (ref 13–44)
MCHC RBC-ENTMCNC: 31.7 PG — SIGNIFICANT CHANGE UP (ref 27–34)
MCHC RBC-ENTMCNC: 32.2 GM/DL — SIGNIFICANT CHANGE UP (ref 32–36)
MCV RBC AUTO: 98.4 FL — SIGNIFICANT CHANGE UP (ref 80–100)
METHOD TYPE: SIGNIFICANT CHANGE UP
METHOD TYPE: SIGNIFICANT CHANGE UP
MONOCYTES # BLD AUTO: 0.79 K/UL — SIGNIFICANT CHANGE UP (ref 0–0.9)
MONOCYTES NFR BLD AUTO: 7.5 % — SIGNIFICANT CHANGE UP (ref 2–14)
NEUTROPHILS # BLD AUTO: 8.15 K/UL — HIGH (ref 1.8–7.4)
NEUTROPHILS NFR BLD AUTO: 76.9 % — SIGNIFICANT CHANGE UP (ref 43–77)
NRBC # BLD: 0 /100 WBCS — SIGNIFICANT CHANGE UP (ref 0–0)
ORGANISM # SPEC MICROSCOPIC CNT: SIGNIFICANT CHANGE UP
PLATELET # BLD AUTO: 213 K/UL — SIGNIFICANT CHANGE UP (ref 150–400)
POTASSIUM SERPL-MCNC: 3.8 MMOL/L — SIGNIFICANT CHANGE UP (ref 3.5–5.3)
POTASSIUM SERPL-SCNC: 3.8 MMOL/L — SIGNIFICANT CHANGE UP (ref 3.5–5.3)
PROT SERPL-MCNC: 7.4 G/DL — SIGNIFICANT CHANGE UP (ref 6–8.3)
RBC # BLD: 3.85 M/UL — LOW (ref 4.2–5.8)
RBC # FLD: 15.1 % — HIGH (ref 10.3–14.5)
SODIUM SERPL-SCNC: 141 MMOL/L — SIGNIFICANT CHANGE UP (ref 135–145)
SPECIMEN SOURCE: SIGNIFICANT CHANGE UP
WBC # BLD: 10.59 K/UL — HIGH (ref 3.8–10.5)
WBC # FLD AUTO: 10.59 K/UL — HIGH (ref 3.8–10.5)

## 2021-11-07 PROCEDURE — 99233 SBSQ HOSP IP/OBS HIGH 50: CPT | Mod: GC

## 2021-11-07 PROCEDURE — 76700 US EXAM ABDOM COMPLETE: CPT | Mod: 26

## 2021-11-07 RX ORDER — ALBUTEROL 90 UG/1
2 AEROSOL, METERED ORAL EVERY 6 HOURS
Refills: 0 | Status: DISCONTINUED | OUTPATIENT
Start: 2021-11-07 | End: 2021-11-09

## 2021-11-07 RX ADMIN — BUDESONIDE AND FORMOTEROL FUMARATE DIHYDRATE 2 PUFF(S): 160; 4.5 AEROSOL RESPIRATORY (INHALATION) at 05:06

## 2021-11-07 RX ADMIN — LISINOPRIL 2.5 MILLIGRAM(S): 2.5 TABLET ORAL at 05:06

## 2021-11-07 RX ADMIN — Medication 2.5 MILLIGRAM(S): at 05:05

## 2021-11-07 RX ADMIN — OXCARBAZEPINE 300 MILLIGRAM(S): 300 TABLET, FILM COATED ORAL at 05:06

## 2021-11-07 RX ADMIN — PYRIDOSTIGMINE BROMIDE 60 MILLIGRAM(S): 60 SOLUTION ORAL at 17:26

## 2021-11-07 RX ADMIN — PYRIDOSTIGMINE BROMIDE 60 MILLIGRAM(S): 60 SOLUTION ORAL at 10:24

## 2021-11-07 RX ADMIN — OXCARBAZEPINE 300 MILLIGRAM(S): 300 TABLET, FILM COATED ORAL at 14:22

## 2021-11-07 RX ADMIN — OXCARBAZEPINE 300 MILLIGRAM(S): 300 TABLET, FILM COATED ORAL at 21:20

## 2021-11-07 RX ADMIN — Medication 250 MILLIGRAM(S): at 17:26

## 2021-11-07 RX ADMIN — PYRIDOSTIGMINE BROMIDE 60 MILLIGRAM(S): 60 SOLUTION ORAL at 21:20

## 2021-11-07 RX ADMIN — ENOXAPARIN SODIUM 40 MILLIGRAM(S): 100 INJECTION SUBCUTANEOUS at 11:20

## 2021-11-07 RX ADMIN — PYRIDOSTIGMINE BROMIDE 60 MILLIGRAM(S): 60 SOLUTION ORAL at 14:21

## 2021-11-07 RX ADMIN — PYRIDOSTIGMINE BROMIDE 60 MILLIGRAM(S): 60 SOLUTION ORAL at 05:05

## 2021-11-07 RX ADMIN — ALBUTEROL 2 PUFF(S): 90 AEROSOL, METERED ORAL at 19:19

## 2021-11-07 RX ADMIN — BUDESONIDE AND FORMOTEROL FUMARATE DIHYDRATE 2 PUFF(S): 160; 4.5 AEROSOL RESPIRATORY (INHALATION) at 19:19

## 2021-11-07 RX ADMIN — PYRIDOSTIGMINE BROMIDE 60 MILLIGRAM(S): 60 SOLUTION ORAL at 01:12

## 2021-11-07 RX ADMIN — CEFTRIAXONE 100 MILLIGRAM(S): 500 INJECTION, POWDER, FOR SOLUTION INTRAMUSCULAR; INTRAVENOUS at 05:05

## 2021-11-07 RX ADMIN — Medication 250 MILLIGRAM(S): at 05:06

## 2021-11-07 NOTE — PROGRESS NOTE ADULT - SUBJECTIVE AND OBJECTIVE BOX
Date/Time Patient Seen:  		  Referring MD:   Data Reviewed	       Patient is a 76y old  Male who presents with a chief complaint of Fever (06 Nov 2021 19:44)      Subjective/HPI     PAST MEDICAL & SURGICAL HISTORY:  CAD (coronary artery disease)    Diabetes mellitus    COPD without exacerbation    Benign prostatic hyperplasia    GERD (gastroesophageal reflux disease)    Myasthenia gravis    Seizures    No significant past surgical history          Medication list         MEDICATIONS  (STANDING):  budesonide 160 MICROgram(s)/formoterol 4.5 MICROgram(s) Inhaler 2 Puff(s) Inhalation two times a day  cefTRIAXone   IVPB 2000 milliGRAM(s) IV Intermittent every 24 hours  enoxaparin Injectable 40 milliGRAM(s) SubCutaneous daily  lisinopril 2.5 milliGRAM(s) Oral daily  OXcarbazepine 300 milliGRAM(s) Oral <User Schedule>  predniSONE   Tablet 2.5 milliGRAM(s) Oral daily  pyridostigmine 60 milliGRAM(s) Oral every 4 hours  saccharomyces boulardii 250 milliGRAM(s) Oral two times a day  sodium chloride 0.9%. 1000 milliLiter(s) (50 mL/Hr) IV Continuous <Continuous>    MEDICATIONS  (PRN):  acetaminophen     Tablet .. 650 milliGRAM(s) Oral every 6 hours PRN Temp greater or equal to 38C (100.4F), Mild Pain (1 - 3)  ALBUTerol    90 MICROgram(s) HFA Inhaler 2 Puff(s) Inhalation every 6 hours PRN Shortness of Breath and/or Wheezing  aluminum hydroxide/magnesium hydroxide/simethicone Suspension 30 milliLiter(s) Oral every 4 hours PRN Dyspepsia  melatonin 3 milliGRAM(s) Oral at bedtime PRN Insomnia  ondansetron Injectable 4 milliGRAM(s) IV Push every 8 hours PRN Nausea and/or Vomiting  traMADol 25 milliGRAM(s) Oral every 6 hours PRN Moderate Pain (4 - 6)         Vitals log        ICU Vital Signs Last 24 Hrs  T(C): 36.7 (07 Nov 2021 04:11), Max: 37 (06 Nov 2021 15:48)  T(F): 98 (07 Nov 2021 04:11), Max: 98.6 (06 Nov 2021 15:48)  HR: 73 (07 Nov 2021 04:11) (60 - 73)  BP: 143/79 (07 Nov 2021 04:11) (130/73 - 143/79)  BP(mean): --  ABP: --  ABP(mean): --  RR: 18 (07 Nov 2021 04:11) (17 - 18)  SpO2: 90% (07 Nov 2021 04:11) (88% - 92%)           Input and Output:  I&O's Detail    06 Nov 2021 08:01  -  07 Nov 2021 07:00  --------------------------------------------------------  IN:    Oral Fluid: 300 mL    sodium chloride 0.9%: 50 mL  Total IN: 350 mL    OUT:    Voided (mL): 400 mL  Total OUT: 400 mL    Total NET: -50 mL          Lab Data                        11.8   11.39 )-----------( 188      ( 06 Nov 2021 09:32 )             36.9     11-06    141  |  107  |  18  ----------------------------<  164<H>  3.8   |  27  |  0.85    Ca    9.0      06 Nov 2021 09:32  Phos  4.1     11-06  Mg     2.2     11-06    TPro  7.2  /  Alb  2.3<L>  /  TBili  0.6  /  DBili  x   /  AST  71<H>  /  ALT  99<H>  /  AlkPhos  66  11-06            Review of Systems	      Objective     Physical Examination  heart s1s2  lung dec BS  abd soft        Pertinent Lab findings & Imaging      Ruthie:  NO   Adequate UO     I&O's Detail    06 Nov 2021 08:01  -  07 Nov 2021 07:00  --------------------------------------------------------  IN:    Oral Fluid: 300 mL    sodium chloride 0.9%: 50 mL  Total IN: 350 mL    OUT:    Voided (mL): 400 mL  Total OUT: 400 mL    Total NET: -50 mL               Discussed with:     Cultures:	        Radiology

## 2021-11-07 NOTE — PROGRESS NOTE ADULT - ASSESSMENT
75 yo M with PMH BPH, GERD. DM, COPD, CAD, seizures, brain hemangioma s/p laser treatment in 2008, and myasthenia gravis presents to the ED with diarrhea and global joint/muscle pain    RHEUM consult noted - vs noted - recs reviewed -   low normal Sat -   enc use of I amanda  COPD rx regimen on order    copd - emphysema - proventil PRN - symbicort - seasonal vaccination - keep sat > 88 pct    Pulm nodules - sub cm - will need follow up as outpatient - repeat CT chest in 6 months     MG - on mestinon - follows with Neuro - on Prednisone - monitor for weakness -     Salmonella Sepsis - bacteremia - on Rocephin - ID following - cx noted    CAD - [facility] rx regimen - BP control - echo done - report pending    GERD - PPI    Join aches - myalgia and arthralgia - may be related to Salmonella Sepsis    Depression - emotional support    BMI > 30 - may need RULA eval as outpatient

## 2021-11-07 NOTE — PROGRESS NOTE ADULT - SUBJECTIVE AND OBJECTIVE BOX
Neurology Follow up note    JACKIE LÓPEZ76yMale    HPI:  75 yo M with PMH BPH, GERD. DM, COPD, CAD, seizures, brain hemagioma s/p laser treatment in 2008, and myasthenia gravis presents to the ED with diarrhea. Of note patient was hospitalized recently at [facility] 10/18-10/21 for MG exacerbation and received 3 days of IVIG. On day after admission, pt developed 1 bout of watery diarrhea which self-resolved, however daughter believes 2/2 decreased PO intake and appetite. He also reports 2 days after admission, he developed muscle pain and weakness located in quadriceps area, 10/10, at rest and with ambulation. Pt states muscle weakness has been so severe he hasn't been able to shave. ROS is positive for fever, abdominal pain, 1 episode of diarrhea (now resolved), decreased appetite, chronic cough, muscle weakness, muscle pain. ROS is negative for chills, n/v, SOB, cp, palpitations, changes in vision or hearing.    In the ED  VS: T101.9 rectal, HR 89, /73, RR 18 SPO2 88% on RA  Labs: WBC 12.85, HH 12.7/38.4, PLTS 183, Na 137, K 4.1, Cr 0.97, Gluc 143, albumin 2.6, lactate 2.3, hs troponin negative x1,   Chest Xray: no acute lung pathology (personal read)  CT chest: pending  CT A/P: pending  EKG: Normal Sinus Rhythm Normal ECG  Given in ED: acetaminophen 650mg PO x1, zosyn, vancomycin 1gm, NS 1.850 L bolus x1 (04 Nov 2021 18:53)      Interval History -no complaints    Patient is seen, chart was reviewed and case was discussed with the treatment team.  Pt is not in any distress.   Lying on bed comfortably.       Vital Signs Last 24 Hrs  T(C): 36.6 (07 Nov 2021 12:31), Max: 37 (06 Nov 2021 15:48)  T(F): 97.9 (07 Nov 2021 12:31), Max: 98.6 (06 Nov 2021 15:48)  HR: 71 (07 Nov 2021 12:31) (60 - 73)  BP: 140/62 (07 Nov 2021 12:31) (130/73 - 143/79)  BP(mean): --  RR: 16 (07 Nov 2021 12:31) (16 - 18)  SpO2: 95% (07 Nov 2021 12:31) (88% - 95%))        REVIEW OF SYSTEMS:    Constitutional: No fever, weight loss or fatigue  Eyes: No eye pain, visual disturbances, or discharge  ENT:  No difficulty hearing, tinnitus, vertigo; No sinus or throat pain  Neck: No pain or stiffness  Respiratory: No cough, wheezing, chills or hemoptysis  Cardiovascular: No chest pain, palpitations, shortness of breath, dizziness or leg swelling  Gastrointestinal: No abdominal or epigastric pain. No nausea, vomiting or hematemesis  Genitourinary: No dysuria, frequency, hematuria or incontinence  Neurological: No headaches, memory loss, l  Psychiatric: No depression, anxiety, mood swings or difficulty sleeping  Musculoskeletal: No joint pain or swelling;   Skin: No itching, burning, rashes or lesions   Lymph Nodes: No enlarged glands  Endocrine: No heat or cold intolerance; No hair loss,  Allergy and Immunologic: No hives or eczema    On Neurological Examination:    Mental Status - Pt is alert, awake, oriented X3.. Follows commands well and able to answer questions appropriately  .Mood and affect  normal    Speech -  Normal      Cranial Nerves - Pupils 3 mm equal and reactive to light, mild ptosis. Pt has no visual field deficit.  Pt has no  facial asymmetry. Facial sensation is intact.  Tongue - is in midline.    Muscle tone - is normal    Motor Exam - 4+/5 all over,   No drift. No shaking or tremors.    Sensory Exam -Pt withdraws all extremities equally on stimulation. No asymmetry seen. No complaints of tingling, numbness.      coordination:    Finger to nose: normal    Deep tendon Reflexes - 2 plus all over.          Neck Supple -  Yes.     MEDICATIONS    acetaminophen     Tablet .. 650 milliGRAM(s) Oral every 6 hours PRN  ALBUTerol    90 MICROgram(s) HFA Inhaler 2 Puff(s) Inhalation every 6 hours PRN  aluminum hydroxide/magnesium hydroxide/simethicone Suspension 30 milliLiter(s) Oral every 4 hours PRN  budesonide 160 MICROgram(s)/formoterol 4.5 MICROgram(s) Inhaler 2 Puff(s) Inhalation two times a day  cefTRIAXone   IVPB 2000 milliGRAM(s) IV Intermittent every 24 hours  enoxaparin Injectable 40 milliGRAM(s) SubCutaneous daily  lisinopril 2.5 milliGRAM(s) Oral daily  melatonin 3 milliGRAM(s) Oral at bedtime PRN  ondansetron Injectable 4 milliGRAM(s) IV Push every 8 hours PRN  OXcarbazepine 300 milliGRAM(s) Oral <User Schedule>  predniSONE   Tablet 2.5 milliGRAM(s) Oral daily  pyridostigmine 60 milliGRAM(s) Oral every 4 hours  saccharomyces boulardii 250 milliGRAM(s) Oral two times a day  sodium chloride 0.9%. 1000 milliLiter(s) IV Continuous <Continuous>  traMADol 25 milliGRAM(s) Oral every 6 hours PRN      Allergies    No Known Allergies    Intolerances                          12.2   10.59 )-----------( 213      ( 07 Nov 2021 07:37 )             37.9       Hemoglobin A1C:     Vitamin B12     RADIOLOGY    ASSESSMENT AND PLAN:      presented weakness; fever  related salmonella bact  MG -stable    antibiotic as per ID  continue mestinone  Physical therapy evaluation.  OOB to chair/ambulation with assistance only.  Pain is accessed and addressed.  Plan of care was discussed with family. Questions answered.  Would continue to follow.

## 2021-11-07 NOTE — PROGRESS NOTE ADULT - SUBJECTIVE AND OBJECTIVE BOX
Patient is a 76y old  Male who presents with a chief complaint of Fever (07 Nov 2021 07:37)    Subjective:  INTERVAL HPI/OVERNIGHT EVENTS: Patient seen and examined at bedside. No overnight events occurred. Patient reports less weakness, and feeling better.  Tolerating PO, + BM x2 dark -black today.  Denies chills, headache, lightheadedness, chest pain, dyspnea, abdominal pain, n/v/d/c.    MEDICATIONS  (STANDING):  budesonide 160 MICROgram(s)/formoterol 4.5 MICROgram(s) Inhaler 2 Puff(s) Inhalation two times a day  cefTRIAXone   IVPB 2000 milliGRAM(s) IV Intermittent every 24 hours  enoxaparin Injectable 40 milliGRAM(s) SubCutaneous daily  lisinopril 2.5 milliGRAM(s) Oral daily  OXcarbazepine 300 milliGRAM(s) Oral <User Schedule>  predniSONE   Tablet 2.5 milliGRAM(s) Oral daily  pyridostigmine 60 milliGRAM(s) Oral every 4 hours  saccharomyces boulardii 250 milliGRAM(s) Oral two times a day  sodium chloride 0.9%. 1000 milliLiter(s) (50 mL/Hr) IV Continuous <Continuous>    MEDICATIONS  (PRN):  acetaminophen     Tablet .. 650 milliGRAM(s) Oral every 6 hours PRN Temp greater or equal to 38C (100.4F), Mild Pain (1 - 3)  ALBUTerol    90 MICROgram(s) HFA Inhaler 2 Puff(s) Inhalation every 6 hours PRN Shortness of Breath and/or Wheezing  aluminum hydroxide/magnesium hydroxide/simethicone Suspension 30 milliLiter(s) Oral every 4 hours PRN Dyspepsia  melatonin 3 milliGRAM(s) Oral at bedtime PRN Insomnia  ondansetron Injectable 4 milliGRAM(s) IV Push every 8 hours PRN Nausea and/or Vomiting  traMADol 25 milliGRAM(s) Oral every 6 hours PRN Moderate Pain (4 - 6)      Allergies  No Known Allergies    Intolerances      REVIEW OF SYSTEMS:  CONSTITUTIONAL: No fever or chills  HEENT:  No headache, no sore throat  RESPIRATORY: + mild cough. No wheezing, or shortness of breath  CARDIOVASCULAR: No chest pain, palpitations  GASTROINTESTINAL: No abd pain, nausea, vomiting, or diarrhea. + "dark" stools x2 - new.   GENITOURINARY: No dysuria, frequency, or hematuria  NEUROLOGICAL: no dizziness  MUSCULOSKELETAL: + myalgias, generalized weakness.      Objective:  Vital Signs Last 24 Hrs  T(C): 36.6 (07 Nov 2021 12:31), Max: 37 (06 Nov 2021 15:48)  T(F): 97.9 (07 Nov 2021 12:31), Max: 98.6 (06 Nov 2021 15:48)  HR: 71 (07 Nov 2021 12:31) (60 - 73)  BP: 140/62 (07 Nov 2021 12:31) (130/73 - 143/79)  RR: 16 (07 Nov 2021 12:31) (16 - 18)  SpO2: 95% (07 Nov 2021 12:31) (88% - 95%)    GENERAL: NAD, lying in bed comfortably, O2 by NC  HEAD:  Atraumatic, Normocephalic  EYES: EOMI, PERRLA, conjunctiva and sclera clear  ENT: Moist mucous membranes  NECK: Supple, No JVD  CHEST/LUNG: b/l diffuse wheezing. No rales, or rhonchi. Unlabored respirations  HEART: Regular rate and rhythm; No murmurs.  ABDOMEN: Bowel sounds present. Soft, Nontender, Mild distention.    EXTREMITIES:  2+ Peripheral Pulses. No clubbing, cyanosis, or edema  NERVOUS SYSTEM:  Alert & Oriented X3, speech clear. No acute deficits   SKIN: No rashes or lesions    LABS:                        12.2   10.59 )-----------( 213      ( 07 Nov 2021 07:37 )             37.9     CBC Full  -  ( 07 Nov 2021 07:37 )  WBC Count : 10.59 K/uL  Hemoglobin : 12.2 g/dL  Hematocrit : 37.9 %  Platelet Count - Automated : 213 K/uL  Mean Cell Volume : 98.4 fl  Mean Cell Hemoglobin : 31.7 pg  Mean Cell Hemoglobin Concentration : 32.2 gm/dL  Auto Neutrophil # : 8.15 K/uL  Auto Lymphocyte # : 1.41 K/uL  Auto Monocyte # : 0.79 K/uL  Auto Eosinophil # : 0.17 K/uL  Auto Basophil # : 0.02 K/uL  Auto Neutrophil % : 76.9 %  Auto Lymphocyte % : 13.3 %  Auto Monocyte % : 7.5 %  Auto Eosinophil % : 1.6 %  Auto Basophil % : 0.2 %    07 Nov 2021 07:37    141    |  107    |  21     ----------------------------<  126    3.8     |  28     |  0.95     Ca    9.0        07 Nov 2021 07:37    TPro  7.4    /  Alb  2.3    /  TBili  0.5    /  DBili  x      /  AST  136    /  ALT  173    /  AlkPhos  75     07 Nov 2021 07:37        Culture - Blood (collected 11-05-21 @ 00:42)  Source: .Blood Blood-Peripheral  Preliminary Report (11-06-21 @ 01:02):    No growth to date.    Culture - Blood (collected 11-05-21 @ 00:42)  Source: .Blood Blood-Peripheral  Gram Stain (11-05-21 @ 14:04):    Growth in aerobic bottle: Gram Negative Rods  Final Report (11-07-21 @ 09:57):    Growth in aerobic bottle: Salmonella species, not typhi/paratyphi    ***Blood Panel PCR results on this specimen are available    approximately 3 hours after the Gram stain result.***    Gram stain, PCR, and/or culture results may not always    correspond due to difference in methodologies.    ************************************************************    This PCR assay was performed by multiplex PCR. This    Assay tests for 66 bacterial and resistance gene targets.    Please refer to the [facility] test directory    at [email or web address] for details.  Organism: Blood Culture PCR  Salmonella species, not typhi/paratyphi (11-07-21 @ 09:57)  Organism: Salmonella species, not typhi/paratyphi (11-07-21 @ 09:55)      -  Ciprofloxacin: I 0.125      Method Type: ETEST  Organism: Salmonella species, not typhi/paratyphi (11-07-21 @ 09:55)      -  Ampicillin: S <=8 These ampicillin results predict results for amoxicillin      -  Ceftriaxone: S <=1      -  Ciprofloxacin: S <=1      -  Trimethoprim/Sulfamethoxazole: S <=2/38      Method Type: STEWART  Organism: Blood Culture PCR (11-05-21 @ 16:27)      -  Salmonella species: Detec      Method Type: PCR    Culture - Urine (collected 11-05-21 @ 00:40)  Source: Clean Catch Clean Catch (Midstream)  Final Report (11-05-21 @ 22:27):    <10,000 CFU/mL Normal Urogenital Katie      RADIOLOGY & ADDITIONAL TESTS:    EXAM:  US ABDOMEN COMPLETE                        PROCEDURE DATE:  11/07/2021      INTERPRETATION:  CLINICAL INFORMATION: Bacteremia, fever, elevated LFTs.  COMPARISON: None available.  TECHNIQUE: Sonography of the abdomen.    FINDINGS:    Liver: Within normal limits.  Bile ducts: Normal caliber. Common bile duct measures 6 mm.  Gallbladder: Within normal limits.  Pancreas: Poorly visualized.  Spleen: 10.8 cm. Within normal limits.  Right kidney: 10.5 cm. No hydronephrosis. Lower pole cyst, measuring 2.5 x 2.4 x 1.9 cm.  Left kidney: 10.0 cm.  No hydronephrosis.  Ascites: None.  Aorta and IVC: Visualized portions are within normal limits.    IMPRESSION:  No cholelithiasis or sonographic evidence of acute cholecystitis.    --- End of Report ---      STEFAN MARTINEZ MD; Attending Radiologist  This document has been electronically signed. Nov 7 2021 12:34PM    -----------------------------------------------------------------------  EXAM:  ECHO TTE WO CON COMP W DOPP      PROCEDURE DATE:  11/06/2021      INTERPRETATION:  INDICATION: Dyspnea  Sonographer PH    Blood Pressure 140/72    Height 162.6 cm     Weight 86.2 kg       BSA 1.9 sq m    Dimensions:  LA 3.1       Normal Values: 2.0 - 4.0 cm  Ao 3.7        Normal Values: 2.0 - 3.8 cm  SEPTUM 1.1       Normal Values: 0.6 - 1.2 cm  PWT 1.0       Normal Values: 0.6 - 1.1 cm  LVIDd 4.4         Normal Values: 3.0 - 5.6 cm  LVIDs 3.4         Normal Values: 1.8 - 4.0 cm      OBSERVATIONS:  Technically difficult study  Mitral Valve: normal, trace physiologic MR.  Aortic Valve/Aorta: Sclerotic trileaflet aortic valve with grossly normal opening.  Tricuspid Valve: normal with trace TR.  Pulmonic Valve: Not well-visualized  Left Atrium: normal  Right Atrium: Not well-visualized  Left Ventricle: normal LV size and systolic function, estimated LVEF of 55%.  Right Ventricle: Grossly normal size and systolic function.  Pericardium: no significant pericardial effusion.    IMPRESSION:  Technically difficult study  Normal left ventricular internal dimensions and systolic function, estimated LVEF of 55%.  Grossly normal RV size and systolic function.  Sclerotic trileaflet aortic valve, without AI.  Trace physiologic MR and TR.  No significant pericardial effusion.    --- End of Report ---      SUZAN MONAHAN MD; Attending Cardiologist  This document has been electronically signed. Nov 7 2021 11:47AM          Personally reviewed.     Consultant(s) Notes Reviewed:  [x] YES  [ ] NO

## 2021-11-07 NOTE — PROGRESS NOTE ADULT - ASSESSMENT
77 yo M with PMH BPH, GERD. DM, COPD, CAD, seizures, brain hemagioma s/p laser treatment in 2008, and myasthenia gravis presents to the ED with diarrhea and global joint/muscle pain admitted with bacteremia sec to salmonella     Problem/Plan - 1:  ·  Problem: Bacteremia  ·  Plan: Patient presenting with leukocytosis, hypoxia, low appetite and weakness.  - CT AP + CT Chest: No evidence of pneumonia. No intra-abdominal pathology.  - Previous episodes of diarrhea at home, but resolved.  - Lactic acid negative and positive procalcitonin 0.05  - Leukocytosis downtrend today 10.59 from 11.39 yesterday. Pt on PO steroids.   - Blood culture positive for Salmonella.   - On Rocephin 2gr QD  - Urine cx negative.   - RVP - negative  - f/u new set of blood cultures  - TTE EF 55%, no signs of endocarditis. Grossly normal  - PT eval: home w/ assist; vs CHEPE  - Monitor WBC and temp.   - ID Dr. Phillip following     Problem/Plan - 2:  ·  Problem: Joint/muscle pain.   ·  Plan: Improving.  - Pain is likely arthritic, poss reactive and decreased PO intake.  - HOLD statin in setting of myopathy.  - R knee xray: Tricompartment degenerative changes. Small suprapatellar effusion.   - Pain management with tramadol 25 prn   - Continue PO prednisone 2.5mg QD  - CK normal. RF borderline, normal uric acid. Positive  and ESR 94.  - Pending anti-DNAs, HLA  - Reumatology: consider Bone Scan Tc 99 to r/o seeding. Typically gram negative bacteremia is associated with an indolent course.   - Continue to monitor symptoms.  f/u new blood cx, CRP and ESR, and ID recs.      Problem/Plan - 3:  ·  Problem: Abnormal LFTs  ·  Plan: normal LFTs on previous admission in 10/21.   - LFTs uptrending during the stay. ,  today, almost x2 from yesterday   - Normal Bilirubin and AP  - Abdominal sonogram normal.   - DC tylenol and avoid hepatotoxic meds.   - GI consult.      Problem/Plan - 4:  ·  Problem: Myasthenia gravis.   ·  Plan: Chronic, stable  - Continue Mestinon 60 mg Q4 hrs  - Swallow eval: regular solids, thin liquids via small/single sips + aspiration precautions  - Follows with Neuro Dr. Bonilla  - Neurology Consulted Dr Bonilla; recs appreciated.     Problem/Plan - 5:  ·  Problem: COPD   ·  Plan: chronic  - Requiring O2 by NC   - b/l wheezing on exam.   - continue prednisone 2.5mg qd, and symbicort. Albuterol is prn and not getting it.   - Albuterol standing q6h ordered.   - Monitor O2 sat.     Problem/Plan - 6:  ·  Problem: Hyponatremia.  Resolved.   ·  Plan: Pt found to be hyponatremic on previous admission.  - Sent home on Sodium Chloride 1g tabs QD  - Na - 140 continue holding  - Monitor daily CMP.     Problem/Plan - 7:  ·  Problem: Seizures.   ·  Plan: - continue oxcarbazpine 300 mg tid  -seizure precautions.     Problem/Plan - 9:  ·  Problem: HTN (hypertension).   ·  Plan: HTN  Chronic  - BP controlled here  - continue lisinopril 2.5 qd with hold parameters.     Problem/Plan - 9:  ·  Problem: HLD (hyperlipidemia).   ·  Plan: chronic, stable  - continue holding atorvastatin in setting of muscle pain     Problem/Plan - 10:  ·  Problem: CAD (coronary artery disease).   ·  Plan: - chronic, stable  -not on any home medications.   -f/u outpatient with PCP.     Problem/Plan - 11:  ·  Problem: DM (diabetes mellitus).   ·  Plan; - chronic, stable  - glu 143 on admission  - not on home medication  - hypoglycemia protocol ordered. If FS running high (>200) can start on sliding scale coverage  - daily CMPs.     Problem/Plan - 12:  ·  Problem: BPH (benign prostatic hyperplasia).   ·  Plan: chronic, stable.     Problem/Plan - 13:  ·  Problem: GERD (gastroesophageal reflux disease).   ·  Plan: - chronic, stable.     Problem/Plan - 14:  ·  Problem: DVT ppx  ·  Plan: DC Lovenox, needs to r/o GI bleed.   - Continue SCDs and mobility.   - FOBT ordered.    75 yo M with PMH BPH, GERD. DM, COPD, CAD, seizures, brain hemagioma s/p laser treatment in 2008, and myasthenia gravis presents to the ED with diarrhea and global joint/muscle pain admitted with bacteremia sec to salmonella     Problem/Plan - 1:  ·  Problem: Bacteremia  ·  Plan: Patient presenting with leukocytosis, hypoxia, low appetite and weakness.  - CT AP + CT Chest: No evidence of pneumonia. No intra-abdominal pathology.  - Previous episodes of diarrhea at home, but resolved.  - Lactic acid negative and positive procalcitonin 0.05  - Leukocytosis downtrend today 10.59 from 11.39 yesterday. Pt on PO steroids.   - Blood culture positive for Salmonella.   - On Rocephin 2gr QD  - Urine cx negative.   - RVP - negative  - f/u new set of blood cultures  - TTE EF 55%, no signs of endocarditis. Grossly normal  - PT eval: home w/ assist; vs [facility]PE  - Monitor WBC and temp.   - ID Dr. Kelly following     Problem/Plan - 2:  ·  Problem: Joint/muscle pain.   ·  Plan: Improving.  - Pain is likely arthritic, poss reactive and decreased PO intake.  - HOLD statin in setting of myopathy.  - R knee xray: Tricompartment degenerative changes. Small suprapatellar effusion.   - Pain management with tramadol 25 prn   - Continue PO prednisone 2.5mg QD  - CK normal. RF borderline, normal uric acid. Positive  and ESR 94.  - Pending anti-DNAs, HLA  - Reumatology: consider Bone Scan Tc 99 to r/o seeding. Typically gram negative bacteremia is associated with an indolent course.   - Continue to monitor symptoms.  f/u new blood cx, CRP and ESR, and ID recs.      Problem/Plan - 3:  ·  Problem: Abnormal LFTs  ·  Plan: normal LFTs on previous admission in 10/21.   - LFTs uptrending during the stay. ,  today, almost x2 from yesterday   - Normal Bilirubin and AP  - Abdominal sonogram normal.   - DC tylenol and avoid hepatotoxic meds.   - GI consult.      Problem/Plan - 4:  ·  Problem: Myasthenia gravis.   ·  Plan: Chronic, stable  - Continue Mestinon 60 mg Q4 hrs  - Swallow eval: regular solids, thin liquids via small/single sips + aspiration precautions  - Follows with Neuro Dr. Bonilla  - Neurology Consulted Dr Bonilla; recs appreciated.     Problem/Plan - 5:  ·  Problem: COPD   ·  Plan: chronic  - Requiring O2 by NC   - b/l wheezing on exam.   - continue prednisone 2.5mg qd, and symbicort. Albuterol is prn and not getting it.   - Albuterol standing q6h ordered.   - Monitor O2 sat.     Problem/Plan - 6:  ·  Problem: Hyponatremia.  Resolved.   ·  Plan: Pt found to be hyponatremic on previous admission.  - Sent home on Sodium Chloride 1g tabs QD  - Na - 140 continue holding  - Monitor daily CMP.     Problem/Plan - 7:  ·  Problem: Seizures.   ·  Plan: - continue oxcarbazpine 300 mg tid  -seizure precautions.     Problem/Plan - 9:  ·  Problem: HTN (hypertension).   ·  Plan: HTN  Chronic  - BP controlled here  - continue lisinopril 2.5 qd with hold parameters.     Problem/Plan - 9:  ·  Problem: HLD (hyperlipidemia).   ·  Plan: chronic, stable  - continue holding atorvastatin in setting of muscle pain     Problem/Plan - 10:  ·  Problem: CAD (coronary artery disease).   ·  Plan: - chronic, stable  -not on any home medications.   -f/u outpatient with PCP.     Problem/Plan - 11:  ·  Problem: DM (diabetes mellitus).   ·  Plan; - chronic, stable  - glu 143 on admission  - not on home medication  - hypoglycemia protocol ordered. If FS running high (>200) can start on sliding scale coverage  - daily CMPs.     Problem/Plan - 12:  ·  Problem: BPH (benign prostatic hyperplasia).   ·  Plan: chronic, stable.     Problem/Plan - 13:  ·  Problem: GERD (gastroesophageal reflux disease).   ·  Plan: - chronic, stable.     Problem/Plan - 14:  ·  Problem: DVT ppx  ·  Plan: DC Lovenox, needs to r/o GI bleed.   - Continue SCDs and mobility.   - FOBT ordered.

## 2021-11-08 ENCOUNTER — TRANSCRIPTION ENCOUNTER (OUTPATIENT)
Age: 76
End: 2021-11-08

## 2021-11-08 LAB
ALBUMIN SERPL ELPH-MCNC: 2.4 G/DL — LOW (ref 3.3–5)
ALP SERPL-CCNC: 68 U/L — SIGNIFICANT CHANGE UP (ref 40–120)
ALT FLD-CCNC: 168 U/L — HIGH (ref 12–78)
ANION GAP SERPL CALC-SCNC: 7 MMOL/L — SIGNIFICANT CHANGE UP (ref 5–17)
AST SERPL-CCNC: 98 U/L — HIGH (ref 15–37)
BASOPHILS # BLD AUTO: 0.05 K/UL — SIGNIFICANT CHANGE UP (ref 0–0.2)
BASOPHILS NFR BLD AUTO: 0.5 % — SIGNIFICANT CHANGE UP (ref 0–2)
BILIRUB SERPL-MCNC: 0.5 MG/DL — SIGNIFICANT CHANGE UP (ref 0.2–1.2)
BUN SERPL-MCNC: 19 MG/DL — SIGNIFICANT CHANGE UP (ref 7–23)
CALCIUM SERPL-MCNC: 9.3 MG/DL — SIGNIFICANT CHANGE UP (ref 8.5–10.1)
CHLORIDE SERPL-SCNC: 104 MMOL/L — SIGNIFICANT CHANGE UP (ref 96–108)
CO2 SERPL-SCNC: 27 MMOL/L — SIGNIFICANT CHANGE UP (ref 22–31)
CREAT SERPL-MCNC: 0.74 MG/DL — SIGNIFICANT CHANGE UP (ref 0.5–1.3)
CRP SERPL-MCNC: 109 MG/L — HIGH
DSDNA AB SER-ACNC: <12 IU/ML — SIGNIFICANT CHANGE UP
EOSINOPHIL # BLD AUTO: 0.19 K/UL — SIGNIFICANT CHANGE UP (ref 0–0.5)
EOSINOPHIL NFR BLD AUTO: 1.7 % — SIGNIFICANT CHANGE UP (ref 0–6)
ERYTHROCYTE [SEDIMENTATION RATE] IN BLOOD: 92 MM/HR — HIGH (ref 0–20)
GLUCOSE SERPL-MCNC: 127 MG/DL — HIGH (ref 70–99)
HCT VFR BLD CALC: 36.4 % — LOW (ref 39–50)
HGB BLD-MCNC: 11.6 G/DL — LOW (ref 13–17)
IMM GRANULOCYTES NFR BLD AUTO: 0.6 % — SIGNIFICANT CHANGE UP (ref 0–1.5)
LYMPHOCYTES # BLD AUTO: 1.23 K/UL — SIGNIFICANT CHANGE UP (ref 1–3.3)
LYMPHOCYTES # BLD AUTO: 11.1 % — LOW (ref 13–44)
MCHC RBC-ENTMCNC: 31.6 PG — SIGNIFICANT CHANGE UP (ref 27–34)
MCHC RBC-ENTMCNC: 31.9 GM/DL — LOW (ref 32–36)
MCV RBC AUTO: 99.2 FL — SIGNIFICANT CHANGE UP (ref 80–100)
MONOCYTES # BLD AUTO: 0.84 K/UL — SIGNIFICANT CHANGE UP (ref 0–0.9)
MONOCYTES NFR BLD AUTO: 7.6 % — SIGNIFICANT CHANGE UP (ref 2–14)
NEUTROPHILS # BLD AUTO: 8.69 K/UL — HIGH (ref 1.8–7.4)
NEUTROPHILS NFR BLD AUTO: 78.5 % — HIGH (ref 43–77)
NRBC # BLD: 0 /100 WBCS — SIGNIFICANT CHANGE UP (ref 0–0)
OB PNL STL: NEGATIVE — SIGNIFICANT CHANGE UP
PLATELET # BLD AUTO: 219 K/UL — SIGNIFICANT CHANGE UP (ref 150–400)
POTASSIUM SERPL-MCNC: 4.1 MMOL/L — SIGNIFICANT CHANGE UP (ref 3.5–5.3)
POTASSIUM SERPL-SCNC: 4.1 MMOL/L — SIGNIFICANT CHANGE UP (ref 3.5–5.3)
PROT SERPL-MCNC: 7.1 G/DL — SIGNIFICANT CHANGE UP (ref 6–8.3)
RBC # BLD: 3.67 M/UL — LOW (ref 4.2–5.8)
RBC # FLD: 14.9 % — HIGH (ref 10.3–14.5)
SODIUM SERPL-SCNC: 138 MMOL/L — SIGNIFICANT CHANGE UP (ref 135–145)
WBC # BLD: 11.07 K/UL — HIGH (ref 3.8–10.5)
WBC # FLD AUTO: 11.07 K/UL — HIGH (ref 3.8–10.5)

## 2021-11-08 PROCEDURE — 99232 SBSQ HOSP IP/OBS MODERATE 35: CPT

## 2021-11-08 PROCEDURE — 99233 SBSQ HOSP IP/OBS HIGH 50: CPT | Mod: GC

## 2021-11-08 RX ORDER — ENOXAPARIN SODIUM 100 MG/ML
40 INJECTION SUBCUTANEOUS DAILY
Refills: 0 | Status: DISCONTINUED | OUTPATIENT
Start: 2021-11-08 | End: 2021-11-09

## 2021-11-08 RX ADMIN — PYRIDOSTIGMINE BROMIDE 60 MILLIGRAM(S): 60 SOLUTION ORAL at 17:00

## 2021-11-08 RX ADMIN — PYRIDOSTIGMINE BROMIDE 60 MILLIGRAM(S): 60 SOLUTION ORAL at 05:14

## 2021-11-08 RX ADMIN — Medication 250 MILLIGRAM(S): at 05:14

## 2021-11-08 RX ADMIN — Medication 250 MILLIGRAM(S): at 17:01

## 2021-11-08 RX ADMIN — OXCARBAZEPINE 300 MILLIGRAM(S): 300 TABLET, FILM COATED ORAL at 21:35

## 2021-11-08 RX ADMIN — BUDESONIDE AND FORMOTEROL FUMARATE DIHYDRATE 2 PUFF(S): 160; 4.5 AEROSOL RESPIRATORY (INHALATION) at 05:15

## 2021-11-08 RX ADMIN — PYRIDOSTIGMINE BROMIDE 60 MILLIGRAM(S): 60 SOLUTION ORAL at 21:35

## 2021-11-08 RX ADMIN — OXCARBAZEPINE 300 MILLIGRAM(S): 300 TABLET, FILM COATED ORAL at 05:14

## 2021-11-08 RX ADMIN — Medication 2.5 MILLIGRAM(S): at 05:14

## 2021-11-08 RX ADMIN — PYRIDOSTIGMINE BROMIDE 60 MILLIGRAM(S): 60 SOLUTION ORAL at 02:17

## 2021-11-08 RX ADMIN — OXCARBAZEPINE 300 MILLIGRAM(S): 300 TABLET, FILM COATED ORAL at 14:20

## 2021-11-08 RX ADMIN — ALBUTEROL 2 PUFF(S): 90 AEROSOL, METERED ORAL at 21:35

## 2021-11-08 RX ADMIN — BUDESONIDE AND FORMOTEROL FUMARATE DIHYDRATE 2 PUFF(S): 160; 4.5 AEROSOL RESPIRATORY (INHALATION) at 21:35

## 2021-11-08 RX ADMIN — PYRIDOSTIGMINE BROMIDE 60 MILLIGRAM(S): 60 SOLUTION ORAL at 14:20

## 2021-11-08 RX ADMIN — ALBUTEROL 2 PUFF(S): 90 AEROSOL, METERED ORAL at 05:15

## 2021-11-08 RX ADMIN — PYRIDOSTIGMINE BROMIDE 60 MILLIGRAM(S): 60 SOLUTION ORAL at 11:02

## 2021-11-08 RX ADMIN — CEFTRIAXONE 100 MILLIGRAM(S): 500 INJECTION, POWDER, FOR SOLUTION INTRAMUSCULAR; INTRAVENOUS at 04:17

## 2021-11-08 RX ADMIN — LISINOPRIL 2.5 MILLIGRAM(S): 2.5 TABLET ORAL at 05:14

## 2021-11-08 NOTE — PROGRESS NOTE ADULT - TIME BILLING
care coordination, plan of care discussed with patient face to face
care coordianiton, plan of care discussed in length with patient face to face, 3E IDR team, ID Dr soni/Kenji
care coordinaiton, plan of care discussed with face to face with patient, 3E IDR team

## 2021-11-08 NOTE — PROGRESS NOTE ADULT - ATTENDING COMMENTS
75 yo M with PMH BPH, GERD. DM, COPD, CAD, seizures, brain hemagioma s/p laser treatment in 2008, and myasthenia gravis presents to the ED with diarrhea and global joint/muscle pain admitted with sepsis poa sec to bacteremia sec to salmonella Plan: dc planning underway, apprec ID recs, f/u blood cx, montior clinial course, apprec sw/cm collaboration in care for dc planning
77 yo M with PMH BPH, GERD. DM, COPD, CAD, seizures, brain hemagioma s/p laser treatment in 2008, and myasthenia gravis presents to the ED with diarrhea and global joint/muscle pain admitted with bacteremia sec to salmonella Plan: cont iv antibx, apprec ID recs, monitor clincial course, f/u repeat blood cultures, apprec rheum dr maya recs
75 yo M with PMH BPH, GERD. DM, COPD, CAD, seizures, brain hemagioma s/p laser treatment in 2008, and myasthenia gravis presents to the ED with diarrhea and global joint/muscle pain admitted with bacteremia sec to salmonella Plan: cont IV antibx, apprec rheum recs, apprec ID recs, f/u repeat blood cultures, apprec PT eval, monitor clinical course, dc planning
75 yo M with PMH BPH, GERD. DM, COPD, CAD, seizures, brain hemagioma s/p laser treatment in 2008, and myasthenia gravis presents to the ED with diarrhea and global joint/muscle pain. Admitted for infectious workup. Consider reactive arthritis given recent diarrhea. Consider rheum workup given global joint pain, joint warmth. Plan:  +GNR blood cx, apprec ID recs, f/u knee xray, monitor clinical course, cont iv antibx as per ID, will cosnider rheum consult dr maya, esr and crp elvated

## 2021-11-08 NOTE — PROGRESS NOTE ADULT - SUBJECTIVE AND OBJECTIVE BOX
Date/Time Patient Seen:  		  Referring MD:   Data Reviewed	       Patient is a 76y old  Male who presents with a chief complaint of Fever (07 Nov 2021 13:13)      Subjective/HPI     PAST MEDICAL & SURGICAL HISTORY:  CAD (coronary artery disease)    Diabetes mellitus    COPD without exacerbation    Benign prostatic hyperplasia    GERD (gastroesophageal reflux disease)    Myasthenia gravis    Seizures    No significant past surgical history          Medication list         MEDICATIONS  (STANDING):  ALBUTerol    90 MICROgram(s) HFA Inhaler 2 Puff(s) Inhalation every 6 hours  budesonide 160 MICROgram(s)/formoterol 4.5 MICROgram(s) Inhaler 2 Puff(s) Inhalation two times a day  cefTRIAXone   IVPB 2000 milliGRAM(s) IV Intermittent every 24 hours  lisinopril 2.5 milliGRAM(s) Oral daily  OXcarbazepine 300 milliGRAM(s) Oral <User Schedule>  predniSONE   Tablet 2.5 milliGRAM(s) Oral daily  pyridostigmine 60 milliGRAM(s) Oral every 4 hours  saccharomyces boulardii 250 milliGRAM(s) Oral two times a day  sodium chloride 0.9%. 1000 milliLiter(s) (50 mL/Hr) IV Continuous <Continuous>    MEDICATIONS  (PRN):  ALBUTerol    90 MICROgram(s) HFA Inhaler 2 Puff(s) Inhalation every 6 hours PRN Shortness of Breath and/or Wheezing  aluminum hydroxide/magnesium hydroxide/simethicone Suspension 30 milliLiter(s) Oral every 4 hours PRN Dyspepsia  melatonin 3 milliGRAM(s) Oral at bedtime PRN Insomnia  ondansetron Injectable 4 milliGRAM(s) IV Push every 8 hours PRN Nausea and/or Vomiting  traMADol 25 milliGRAM(s) Oral every 6 hours PRN Moderate Pain (4 - 6)         Vitals log        ICU Vital Signs Last 24 Hrs  T(C): 36.3 (08 Nov 2021 04:25), Max: 36.8 (07 Nov 2021 19:42)  T(F): 97.4 (08 Nov 2021 04:25), Max: 98.3 (07 Nov 2021 19:42)  HR: 68 (08 Nov 2021 04:25) (67 - 71)  BP: 135/72 (08 Nov 2021 04:25) (135/72 - 140/62)  BP(mean): --  ABP: --  ABP(mean): --  RR: 18 (08 Nov 2021 04:25) (16 - 18)  SpO2: 90% (08 Nov 2021 04:25) (90% - 95%)           Input and Output:  I&O's Detail    06 Nov 2021 08:01  -  07 Nov 2021 07:00  --------------------------------------------------------  IN:    Oral Fluid: 300 mL    sodium chloride 0.9%: 50 mL  Total IN: 350 mL    OUT:    Voided (mL): 400 mL  Total OUT: 400 mL    Total NET: -50 mL      07 Nov 2021 07:01  -  08 Nov 2021 06:24  --------------------------------------------------------  IN:    IV PiggyBack: 50 mL    Oral Fluid: 300 mL  Total IN: 350 mL    OUT:  Total OUT: 0 mL    Total NET: 350 mL          Lab Data                        12.2   10.59 )-----------( 213      ( 07 Nov 2021 07:37 )             37.9     11-07    141  |  107  |  21  ----------------------------<  126<H>  3.8   |  28  |  0.95    Ca    9.0      07 Nov 2021 07:37  Phos  4.1     11-06  Mg     2.2     11-06    TPro  7.4  /  Alb  2.3<L>  /  TBili  0.5  /  DBili  x   /  AST  136<H>  /  ALT  173<H>  /  AlkPhos  75  11-07            Review of Systems	      Objective     Physical Examination    heart s1s2  lung dec BS  abd soft      Pertinent Lab findings & Imaging      Ruthie:  NO   Adequate UO     I&O's Detail    06 Nov 2021 08:01  -  07 Nov 2021 07:00  --------------------------------------------------------  IN:    Oral Fluid: 300 mL    sodium chloride 0.9%: 50 mL  Total IN: 350 mL    OUT:    Voided (mL): 400 mL  Total OUT: 400 mL    Total NET: -50 mL      07 Nov 2021 07:01  -  08 Nov 2021 06:24  --------------------------------------------------------  IN:    IV PiggyBack: 50 mL    Oral Fluid: 300 mL  Total IN: 350 mL    OUT:  Total OUT: 0 mL    Total NET: 350 mL               Discussed with:     Cultures:	        Radiology

## 2021-11-08 NOTE — PROGRESS NOTE ADULT - ASSESSMENT
77 yo M with PMH BPH, GERD. DM, COPD, CAD, seizures, brain hemagioma s/p laser treatment in 2008, and myasthenia gravis presents to the ED with diarrhea and global joint/muscle pain admitted with bacteremia sec to salmonella     Problem/Plan - 1:  ·  Problem: Bacteremia  ·  Plan: Patient presenting with leukocytosis, hypoxia, low appetite and weakness.  - CT AP + CT Chest: No evidence of pneumonia. No intra-abdominal pathology.  - Previous episodes of diarrhea at home, but resolved.  - Lactic acid negative and positive procalcitonin 0.05  - Leukocytosis downtrended and stable ~11; Pt on PO steroids.   - Blood culture positive for Salmonella.   - On Rocephin 2gr QD: ID following and When ready for discharge can switch to oral Cipro 500 g q12 to complete the course. last day would be 11/19   - Urine cx and RVP negative  - f/u new set of blood cultures drawn 11/8  - TTE EF 55%, no signs of endocarditis. Grossly normal  - PT eval: home w/ assist; vs [facility]PE     Problem/Plan - 2:  ·  Problem: Joint/muscle pain.   ·  Plan: Improving.  - Pain is likely arthritic, poss reactive and decreased PO intake.  - HOLD statin in setting of myopathy.  - R knee xray: Tricompartment degenerative changes. Small suprapatellar effusion.   - Pain management with tramadol 25 prn   - Continue PO prednisone 2.5mg QD  - CK normal. RF borderline, normal uric acid. Positive  and ESR 94.  - Pending anti-DNAs, HLA  - Reumatology: consider Bone Scan Tc 99 to r/o seeding. Typically gram negative bacteremia is associated with an indolent course.   - Continue to monitor symptoms.  f/u new blood cx, CRP and ESR, and ID recs.      Problem/Plan - 3:  ·  Problem: Abnormal LFTs  ·  Plan: normal LFTs on previous admission in 10/21.   - LFTs uptrending during the stay, stable continue to monitor  - Normal Bilirubin and AP  - Abdominal sonogram normal.   - DC tylenol and avoid hepatotoxic meds.   - GI consulted: Suspect multifactorial etiology of salmonella bacteremia, ceftriaxone, and statin could all have caused transaminitis     Problem/Plan - 4:  ·  Problem: Myasthenia gravis.   ·  Plan: Chronic, stable  - Continue Mestinon 60 mg Q4 hrs  - Swallow eval: regular solids, thin liquids via small/single sips + aspiration precautions  - Follows with Neuro Dr. Bonilla who is following     Problem/Plan - 5:  ·  Problem: COPD   ·  Plan: chronic  - Requiring O2 by NC   - b/l wheezing on exam.   - continue prednisone 2.5mg qd, and symbicort. Albuterol is prn and not getting it.   - Albuterol standing q6h ordered.   - Monitor O2 sat  - Pulm nodules on imaging, repeat CT chest in 6 months outpatient        Problem/Plan - 6:  ·  Problem: Hyponatremia.  Resolved.   ·  Plan: Pt found to be hyponatremic on previous admission.  - Sent home on Sodium Chloride 1g tabs QD  - Monitor daily CMP, has been WNL      Problem/Plan - 7:  ·  Problem: Seizures.   ·  Plan: - continue oxcarbazpine 300 mg tid  -seizure precautions.     Problem/Plan - 9:  ·  Problem: HTN (hypertension).   ·  Plan: HTN  Chronic  - BP controlled here  - continue lisinopril 2.5 qd with hold parameters.     Problem/Plan - 9:  ·  Problem: HLD (hyperlipidemia).   ·  Plan: chronic, stable  - continue holding atorvastatin in setting of myopathy     Problem/Plan - 10:  ·  Problem: CAD (coronary artery disease).   ·  Plan: - chronic, stable  -not on any home medications.   -f/u outpatient with PCP.     Problem/Plan - 11:  ·  Problem: DM (diabetes mellitus).   ·  Plan; - chronic, stable  - glu 143 on admission  - not on home medication  - hypoglycemia protocol ordered. If FS running high (>200) can start on sliding scale coverage     Problem/Plan - 12:  ·  Problem: BPH (benign prostatic hyperplasia).   ·  Plan: chronic, stable.     Problem/Plan - 13:  ·  Problem: GERD (gastroesophageal reflux disease).   ·  Plan: - chronic, stable.     Problem/Plan - 14:  ·  Problem: DVT ppx  - Continue SCDs and mobility.   - FOBT negative and patient denies recurrent episodes of blood in stool  - can resume Lovenox   77 yo M with PMH BPH, GERD. DM, COPD, CAD, seizures, brain hemagioma s/p laser treatment in 2008, and myasthenia gravis presents to the ED with diarrhea and global joint/muscle pain admitted with sepsis poa sec to bacteremia sec to salmonella     Problem/Plan - 1:  ·  Problem: Bacteremia-sepsis poa  ·  Plan: Patient presenting with leukocytosis, hypoxia, low appetite and weakness.  - CT AP + CT Chest: No evidence of pneumonia. No intra-abdominal pathology.  - Previous episodes of diarrhea at home, but resolved.  - Lactic acid negative and positive procalcitonin 0.05  - Leukocytosis downtrended and stable ~11; Pt on PO steroids.   - Blood culture positive for Salmonella. repeat cultures negative if remain neg tomorrow will dc home with po as >48hrs  - On Rocephin 2gr QD: ID following and When ready for discharge can switch to oral Cipro 500 g q12 to complete the course. last day would be 11/19   - Urine cx and RVP negative  - f/u new set of blood cultures drawn 11/8  - TTE EF 55%, no signs of endocarditis. Grossly normal  - PT eval: home w/ assist; vs [facility]PE     Problem/Plan - 2:  ·  Problem: Joint/muscle pain.   ·  Plan: Improving.  - Pain is likely arthritic, poss reactive and decreased PO intake.  - HOLD statin in setting of myopathy.  - R knee xray: Tricompartment degenerative changes. Small suprapatellar effusion.   - Pain management with tramadol 25 prn   - Continue PO prednisone 2.5mg QD  - CK normal. RF borderline, normal uric acid. Positive  and ESR 94.  - Pending anti-DNAs, HLA  - Reumatology: consider Bone Scan Tc 99 to r/o seeding. Typically gram negative bacteremia is associated with an indolent course.   - Continue to monitor symptoms.  f/u new blood cx, CRP and ESR, and ID recs.      Problem/Plan - 3:  ·  Problem: Abnormal LFTs  ·  Plan: normal LFTs on previous admission in 10/21.   - LFTs uptrending during the stay, stable continue to monitor  - Normal Bilirubin and AP  - Abdominal sonogram normal.   - DC tylenol and avoid hepatotoxic meds.   - GI consulted: Suspect multifactorial etiology of salmonella bacteremia, ceftriaxone, and statin could all have caused transaminitis     Problem/Plan - 4:  ·  Problem: Myasthenia gravis.   ·  Plan: Chronic, stable  - Continue Mestinon 60 mg Q4 hrs  - Swallow eval: regular solids, thin liquids via small/single sips + aspiration precautions  - Follows with Neuro Dr. Bonilla who is following     Problem/Plan - 5:  ·  Problem: COPD   ·  Plan: chronic  - Requiring O2 by NC   - b/l wheezing on exam.   - continue prednisone 2.5mg qd, and symbicort. Albuterol is prn and not getting it.   - Albuterol standing q6h ordered.   - Monitor O2 sat  - Pulm nodules on imaging, repeat CT chest in 6 months outpatient        Problem/Plan - 6:  ·  Problem: Hyponatremia.  Resolved.   ·  Plan: Pt found to be hyponatremic on previous admission.  - Sent home on Sodium Chloride 1g tabs QD  - Monitor daily CMP, has been WNL      Problem/Plan - 7:  ·  Problem: Seizures.   ·  Plan: - continue oxcarbazpine 300 mg tid  -seizure precautions.     Problem/Plan - 9:  ·  Problem: HTN (hypertension).   ·  Plan: HTN  Chronic  - BP controlled here  - continue lisinopril 2.5 qd with hold parameters.     Problem/Plan - 9:  ·  Problem: HLD (hyperlipidemia).   ·  Plan: chronic, stable  - continue holding atorvastatin in setting of myopathy     Problem/Plan - 10:  ·  Problem: CAD (coronary artery disease).   ·  Plan: - chronic, stable  -not on any home medications.   -f/u outpatient with PCP.     Problem/Plan - 11:  ·  Problem: DM (diabetes mellitus).   ·  Plan; - chronic, stable  - glu 143 on admission  - not on home medication  - hypoglycemia protocol ordered. If FS running high (>200) can start on sliding scale coverage     Problem/Plan - 12:  ·  Problem: BPH (benign prostatic hyperplasia).   ·  Plan: chronic, stable.     Problem/Plan - 13:  ·  Problem: GERD (gastroesophageal reflux disease).   ·  Plan: - chronic, stable.     Problem/Plan - 14:  ·  Problem: DVT ppx  - Continue SCDs and mobility.   - FOBT negative and patient denies recurrent episodes of blood in stool  - can resume Lovenox

## 2021-11-08 NOTE — DISCHARGE NOTE NURSING/CASE MANAGEMENT/SOCIAL WORK - PATIENT PORTAL LINK FT
You can access the FollowMyHealth Patient Portal offered by [facility] by registering at the following website: [email or web address]. By joining [employer]’s FollowMyHealth portal, you will also be able to view your health information using other applications (apps) compatible with our system.

## 2021-11-08 NOTE — PROGRESS NOTE ADULT - SUBJECTIVE AND OBJECTIVE BOX
[facility]  INFECTIOUS DISEASES   [address] [place] [address]  Tel: [phone]     Fax: [phone]  ======================================================  MD Briseida Ray Kaushal, MD Cho, Michelle, MD   ======================================================    [id number]  JACKIE LÓPEZ     Follow up: Salmonella bacteremia     No more fever, no abdominal pain, Had normal BM this morning.   Appetite is better.   He has social issue,  from wife so eats out or orders in all the time.   Most likely salmonella infection from food.     PAST MEDICAL & SURGICAL HISTORY:  CAD (coronary artery disease)  Diabetes mellitus  COPD without exacerbation  Benign prostatic hyperplasia  GERD (gastroesophageal reflux disease)  Myasthenia gravis  Seizures  No significant past surgical history    Social Hx: no smoking, ETOH or drugs    FAMILY HISTORY:  FHx: stroke    Allergies  No Known Allergies    Antibiotics:  none     REVIEW OF SYSTEMS:  CONSTITUTIONAL:  + Fever, no chills  HEENT:  No diplopia or blurred vision.  No sore throat or runny nose.  CARDIOVASCULAR:  No chest pain or SOB.  RESPIRATORY:  No cough, shortness of breath, PND or orthopnea.  GASTROINTESTINAL:  No nausea, vomiting or diarrhea.  GENITOURINARY:  No dysuria, frequency or urgency. No Blood in urine  MUSCULOSKELETAL: pain in left leg and foot  SKIN:  No change in skin, hair or nails.  NEUROLOGIC:  No paresthesias, fasciculations, seizures or weakness.  PSYCHIATRIC:  No disorder of thought or mood.  ENDOCRINE:  No heat or cold intolerance, polyuria or polydipsia.  HEMATOLOGICAL:  No easy bruising or bleeding.     Physical Exam:  Vital Signs Last 24 Hrs  T(C): 36.6 (08 Nov 2021 11:17), Max: 36.8 (07 Nov 2021 19:42)  T(F): 97.9 (08 Nov 2021 11:17), Max: 98.3 (07 Nov 2021 19:42)  HR: 66 (08 Nov 2021 11:17) (66 - 71)  BP: 119/61 (08 Nov 2021 11:17) (119/61 - 140/62)  BP(mean): --  RR: 18 (08 Nov 2021 11:17) (16 - 18)  SpO2: 92% (08 Nov 2021 11:17) (90% - 95%)  GEN: NAD  HEENT: normocephalic and atraumatic. EOMI. PERRL.    NECK: Supple.  No lymphadenopathy   LUNGS: Clear to auscultation.  HEART: Regular rate and rhythm without murmur.  ABDOMEN: Soft, nontender, and nondistended.  Positive bowel sounds.    : No CVA tenderness  EXTREMITIES: Without any cyanosis, clubbing, rash, lesions or edema.  NEUROLOGIC: grossly intact.  PSYCHIATRIC: Appropriate affect .  SKIN: No ulceration or induration present.    Labs:                        11.6   11.07 )-----------( 219      ( 08 Nov 2021 07:55 )             36.4     11-08    138  |  104  |  19  ----------------------------<  127<H>  4.1   |  27  |  0.74    Ca    9.3      08 Nov 2021 07:55    TPro  7.1  /  Alb  2.4<L>  /  TBili  0.5  /  DBili  x   /  AST  98<H>  /  ALT  168<H>  /  AlkPhos  68  11-08    Culture - Blood (collected 11-06-21 @ 14:45)  Source: .Blood Blood    Culture - Blood (collected 11-05-21 @ 00:42)  Source: .Blood Blood-Peripheral    Culture - Blood (collected 11-05-21 @ 00:42)  Source: .Blood Blood-Peripheral  Gram Stain (11-05-21 @ 14:04):    Growth in aerobic bottle: Gram Negative Rods  Final Report (11-07-21 @ 09:57):    Growth in aerobic bottle: Salmonella species, not typhi/paratyphi    ***Blood Panel PCR results on this specimen are available    approximately 3 hours after the Gram stain result.***    Gram stain, PCR, and/or culture results may not always    correspond due to difference in methodologies.    ************************************************************    This PCR assay was performed by multiplex PCR. This    Assay tests for 66 bacterial and resistance gene targets.    Please refer to the [facility] test directory    at [email or web address] for details.  Organism: Blood Culture PCR  Salmonella species, not typhi/paratyphi (11-07-21 @ 09:57)  Organism: Salmonella species, not typhi/paratyphi (11-07-21 @ 09:55)    Sensitivities:      -  Ciprofloxacin: I 0.125      Method Type: ETEST  Organism: Salmonella species, not typhi/paratyphi (11-07-21 @ 09:55)    Sensitivities:      -  Ampicillin: S <=8 These ampicillin results predict results for amoxicillin      -  Ceftriaxone: S <=1      -  Ciprofloxacin: S <=1      -  Trimethoprim/Sulfamethoxazole: S <=0.5/9.5      Method Type: STEWART  Organism: Blood Culture PCR (11-05-21 @ 16:27)    Sensitivities:      -  Salmonella species: Detec      Method Type: PCR    Culture - Urine (collected 11-05-21 @ 00:40)  Source: Clean Catch Clean Catch (Midstream)  Final Report (11-05-21 @ 22:27):    <10,000 CFU/mL Normal Urogenital Katie    WBC Count: 11.07 K/uL (11-08-21 @ 07:55)  WBC Count: 10.59 K/uL (11-07-21 @ 07:37)  WBC Count: 11.39 K/uL (11-06-21 @ 09:32)  WBC Count: 10.32 K/uL (11-05-21 @ 07:19)  WBC Count: 12.85 K/uL (11-04-21 @ 18:14)    Creatinine, Serum: 0.74 mg/dL (11-08-21 @ 07:55)  Creatinine, Serum: 0.95 mg/dL (11-07-21 @ 07:37)  Creatinine, Serum: 0.85 mg/dL (11-06-21 @ 09:32)  Creatinine, Serum: 0.86 mg/dL (11-05-21 @ 07:19)  Creatinine, Serum: 0.97 mg/dL (11-04-21 @ 18:14)    C-Reactive Protein, Serum: 109 mg/L (11-08-21 @ 10:43)  C-Reactive Protein, Serum: 171 mg/L (11-05-21 @ 16:12)    Sedimentation Rate, Erythrocyte: 92 mm/hr (11-08-21 @ 07:55)  Sedimentation Rate, Erythrocyte: 94 mm/hr (11-05-21 @ 11:25)    Procalcitonin, Serum: 0.05 ng/mL (11-05-21 @ 07:19)     SARS-CoV-2: NotDetec (11-04-21 @ 18:14)  Rapid RVP Result: NotDetec (11-04-21 @ 18:14)    COVID-19 PCR: NotDetec (10-18-21 @ 14:15)    All imaging and other data have been reviewed.  < from: CT Chest w/ IV Cont (11.04.21 @ 19:56) >  IMPRESSION:  No evidence of pneumonia.  No intra-abdominal pathology.    Assessment and Plan:   77 yo man with PMH of DM2, CAD, BPH, COPD, Myasthenia gravis and seizures with brain hemangioma s/p laser treatment in 2008, was admitted with fever. He was admitted at [facility] 10/18-10/21 for MG exacerbation and received 3 days of IVIG. On day after admission, he had one bout of watery diarrhea which self-resolved. The only complaint is pain left calf and foot. Since was admitted and was less active will rule out DVTs. No other source of infection at this time, CT chest and abdomen are negative. IVIG can cause fever but usually not after 2 weeks.     1- Salmonella Bacteremia:   - Blood culture x 2 with Salmonella pansensitive on 11/5 ( etest with Intermediate to Cipro?)   - Repeat blood culture negative on 11/6   - UA negative   - Abdominal CT and Chest CT negative   - Ceftriaxone 2gm daily   - Will treat for total 2 weeks   - When ready for discharge can switch to oral Cipro 500,g q12 to complete the course. last day would be 11/19     Will follow.  Discussed with the primary team.    Priya Phillip MD  Division of Infectious Diseases   Cell [phone] between 8am and 6pm   After 6pm and weekends please call ID service at [phone].

## 2021-11-08 NOTE — PROGRESS NOTE ADULT - SUBJECTIVE AND OBJECTIVE BOX
Neurology Follow up note    JACKIE LÓPEZ76yMale    HPI:  77 yo M with PMH BPH, GERD. DM, COPD, CAD, seizures, brain hemagioma s/p laser treatment in 2008, and myasthenia gravis presents to the ED with diarrhea. Of note patient was hospitalized recently at [facility] 10/18-10/21 for MG exacerbation and received 3 days of IVIG. On day after admission, pt developed 1 bout of watery diarrhea which self-resolved, however daughter believes 2/2 decreased PO intake and appetite. He also reports 2 days after admission, he developed muscle pain and weakness located in quadriceps area, 10/10, at rest and with ambulation. Pt states muscle weakness has been so severe he hasn't been able to shave. ROS is positive for fever, abdominal pain, 1 episode of diarrhea (now resolved), decreased appetite, chronic cough, muscle weakness, muscle pain. ROS is negative for chills, n/v, SOB, cp, palpitations, changes in vision or hearing.    In the ED  VS: T101.9 rectal, HR 89, /73, RR 18 SPO2 88% on RA  Labs: WBC 12.85, HH 12.7/38.4, PLTS 183, Na 137, K 4.1, Cr 0.97, Gluc 143, albumin 2.6, lactate 2.3, hs troponin negative x1,   Chest Xray: no acute lung pathology (personal read)  CT chest: pending  CT A/P: pending  EKG: Normal Sinus Rhythm Normal ECG  Given in ED: acetaminophen 650mg PO x1, zosyn, vancomycin 1gm, NS 1.850 L bolus x1 (04 Nov 2021 18:53)      Interval History -no new events    Patient is seen, chart was reviewed and case was discussed with the treatment team.  Pt is not in any distress.   Lying on bed comfortably.       Vital Signs Last 24 Hrs  T(C): 36.6 (08 Nov 2021 11:17), Max: 36.8 (07 Nov 2021 19:42)  T(F): 97.9 (08 Nov 2021 11:17), Max: 98.3 (07 Nov 2021 19:42)  HR: 66 (08 Nov 2021 11:17) (66 - 71)  BP: 119/61 (08 Nov 2021 11:17) (119/61 - 140/62)  BP(mean): --  RR: 18 (08 Nov 2021 11:17) (16 - 18)  SpO2: 92% (08 Nov 2021 11:17) (90% - 95%)        REVIEW OF SYSTEMS:    Constitutional: No fever, weight loss or fatigue  Eyes: No eye pain, visual disturbances, or discharge  ENT:  No difficulty hearing, tinnitus, vertigo; No sinus or throat pain  Neck: No pain or stiffness  Respiratory: No cough, wheezing, chills or hemoptysis  Cardiovascular: No chest pain, palpitations, shortness of breath, dizziness or leg swelling  Gastrointestinal: No abdominal or epigastric pain. No nausea, vomiting or hematemesis  Genitourinary: No dysuria, frequency, hematuria or incontinence  Neurological: No headaches, memory loss, l  Psychiatric: No depression, anxiety, mood swings or difficulty sleeping  Musculoskeletal: No joint pain or swelling;   Skin: No itching, burning, rashes or lesions   Lymph Nodes: No enlarged glands  Endocrine: No heat or cold intolerance; No hair loss,  Allergy and Immunologic: No hives or eczema    On Neurological Examination:    Mental Status - Pt is alert, awake, oriented X3.. Follows commands well and able to answer questions appropriately  .Mood and affect  normal    Speech -  Normal      Cranial Nerves - Pupils 3 mm equal and reactive to light, mild ptosis. Pt has no visual field deficit.  Pt has no  facial asymmetry. Facial sensation is intact.  Tongue - is in midline.    Muscle tone - is normal    Motor Exam - 4+/5 all over,   No drift. No shaking or tremors.    Sensory Exam -Pt withdraws all extremities equally on stimulation. No asymmetry seen. No complaints of tingling, numbness.      coordination:    Finger to nose: normal    Deep tendon Reflexes - 2 plus all over.          Neck Supple -  Yes.     MEDICATIONS    acetaminophen     Tablet .. 650 milliGRAM(s) Oral every 6 hours PRN  ALBUTerol    90 MICROgram(s) HFA Inhaler 2 Puff(s) Inhalation every 6 hours PRN  aluminum hydroxide/magnesium hydroxide/simethicone Suspension 30 milliLiter(s) Oral every 4 hours PRN  budesonide 160 MICROgram(s)/formoterol 4.5 MICROgram(s) Inhaler 2 Puff(s) Inhalation two times a day  cefTRIAXone   IVPB 2000 milliGRAM(s) IV Intermittent every 24 hours  enoxaparin Injectable 40 milliGRAM(s) SubCutaneous daily  lisinopril 2.5 milliGRAM(s) Oral daily  melatonin 3 milliGRAM(s) Oral at bedtime PRN  ondansetron Injectable 4 milliGRAM(s) IV Push every 8 hours PRN  OXcarbazepine 300 milliGRAM(s) Oral <User Schedule>  predniSONE   Tablet 2.5 milliGRAM(s) Oral daily  pyridostigmine 60 milliGRAM(s) Oral every 4 hours  saccharomyces boulardii 250 milliGRAM(s) Oral two times a day  sodium chloride 0.9%. 1000 milliLiter(s) IV Continuous <Continuous>  traMADol 25 milliGRAM(s) Oral every 6 hours PRN      Allergies    No Known Allergies    Intolerances                          11.6   11.07 )-----------( 219      ( 08 Nov 2021 07:55 )             36.4   11-08    138  |  104  |  19  ----------------------------<  127<H>  4.1   |  27  |  0.74    Ca    9.3      08 Nov 2021 07:55    TPro  7.1  /  Alb  2.4<L>  /  TBili  0.5  /  DBili  x   /  AST  98<H>  /  ALT  168<H>  /  AlkPhos  68  11-08      Hemoglobin A1C:     Vitamin B12     RADIOLOGY    ASSESSMENT AND PLAN:      presented weakness; fever  related salmonella bact  MG -stable    antibiotic as per ID  continue mestinone  Physical therapy evaluation.  OOB to chair/ambulation with assistance only.  Pain is accessed and addressed.  Plan of care was discussed with family. Questions answered.  Would continue to follow up

## 2021-11-08 NOTE — CONSULT NOTE ADULT - SUBJECTIVE AND OBJECTIVE BOX
[facility]  Timoteo Cardona PA-C  [address] [place] [address]  [phone]      Chief Complaint:  Patient is a 76y old  Male who presents with a chief complaint of Fever (2021 11:31)      HPI:  75 yo M with PMH BPH, GERD. DM, COPD, CAD, seizures, brain hemagioma s/p laser treatment in , and myasthenia gravis presents to the ED with diarrhea. Of note patient was hospitalized recently at [facility] 10/18-10/21 for MG exacerbation and received 3 days of IVIG. On day after admission, pt developed 1 bout of watery diarrhea which self-resolved, however daughter believes 2/2 decreased PO intake and appetite. He also reports 2 days after admission, he developed muscle pain and weakness located in quadriceps area, 10/10, at rest and with ambulation. Pt states muscle weakness has been so severe he hasn't been able to shave. ROS is positive for fever, abdominal pain, 1 episode of diarrhea (now resolved), decreased appetite, chronic cough, muscle weakness, muscle pain. ROS is negative for chills, n/v, SOB, cp, palpitations, changes in vision or hearing.    In the ED  VS: T101.9 rectal, HR 89, /73, RR 18 SPO2 88% on RA  Labs: WBC 12.85, HH 12.7/38.4, PLTS 183, Na 137, K 4.1, Cr 0.97, Gluc 143, albumin 2.6, lactate 2.3, hs troponin negative x1,   Chest Xray: no acute lung pathology (personal read)  CT chest: pending  CT A/P: pending  EKG: Normal Sinus Rhythm Normal ECG  Given in ED: acetaminophen 650mg PO x1, zosyn, vancomycin 1gm, NS 1.850 L bolus x1      Allergies:  No Known Allergies      Medications:  ALBUTerol    90 MICROgram(s) HFA Inhaler 2 Puff(s) Inhalation every 6 hours PRN  ALBUTerol    90 MICROgram(s) HFA Inhaler 2 Puff(s) Inhalation every 6 hours  aluminum hydroxide/magnesium hydroxide/simethicone Suspension 30 milliLiter(s) Oral every 4 hours PRN  budesonide 160 MICROgram(s)/formoterol 4.5 MICROgram(s) Inhaler 2 Puff(s) Inhalation two times a day  cefTRIAXone   IVPB 2000 milliGRAM(s) IV Intermittent every 24 hours  lisinopril 2.5 milliGRAM(s) Oral daily  melatonin 3 milliGRAM(s) Oral at bedtime PRN  ondansetron Injectable 4 milliGRAM(s) IV Push every 8 hours PRN  OXcarbazepine 300 milliGRAM(s) Oral <User Schedule>  predniSONE   Tablet 2.5 milliGRAM(s) Oral daily  pyridostigmine 60 milliGRAM(s) Oral every 4 hours  saccharomyces boulardii 250 milliGRAM(s) Oral two times a day  sodium chloride 0.9%. 1000 milliLiter(s) IV Continuous <Continuous>  traMADol 25 milliGRAM(s) Oral every 6 hours PRN      PMHX/PSHX:  CAD (coronary artery disease)    Diabetes mellitus    COPD without exacerbation    Benign prostatic hyperplasia    GERD (gastroesophageal reflux disease)    Myasthenia gravis    Seizures    No significant past surgical history        Family history:  FHx: stroke        Social History:     ROS:     General:  No wt loss, fevers, chills, night sweats, fatigue,   Eyes:  Good vision, no reported pain  ENT:  No sore throat, pain, runny nose, dysphagia  CV:  No pain, palpitations, hypo/hypertension  Resp:  No dyspnea, cough, tachypnea, wheezing  GI:  No pain, No nausea, No vomiting, No diarrhea, No constipation, No weight loss, No fever, No pruritis, No rectal bleeding, No tarry stools, No dysphagia,  :  No pain, bleeding, incontinence, nocturia  Muscle:  No pain, weakness  Neuro:  No weakness, tingling, memory problems  Psych:  No fatigue, insomnia, mood problems, depression  Endocrine:  No polyuria, polydipsia, cold/heat intolerance  Heme:  No petechiae, ecchymosis, easy bruisability  Skin:  No rash, tattoos, scars, edema      PHYSICAL EXAM:   Vital Signs:  Vital Signs Last 24 Hrs  T(C): 36.6 (2021 11:17), Max: 36.8 (2021 19:42)  T(F): 97.9 (2021 11:17), Max: 98.3 (2021 19:42)  HR: 66 (2021 11:17) (66 - 71)  BP: 119/61 (2021 11:17) (119/61 - 140/62)  BP(mean): --  RR: 18 (2021 11:17) (16 - 18)  SpO2: 92% (2021 11:17) (90% - 95%)  Daily     Daily Weight in k.5 (2021 04:25)    GENERAL:  Appears stated age,   HEENT:  NC/AT,    CHEST:  Full & symmetric excursion,   HEART:  Regular rhythm  ABDOMEN:  Soft, non-tender, non-distended,   EXTEREMITIES:  no cyanosis,clubbing or edema  SKIN:  No rash  NEURO:  Alert,    LABS:                        11.6    )-----------( 219      ( 2021 07:55 )             36.4     11    138  |  104  |  19  ----------------------------<  127<H>  4.1   |  27  |  0.74    Ca    9.3      2021 07:55    TPro  7.1  /  Alb  2.4<L>  /  TBili  0.5  /  DBili  x   /  AST  98<H>  /  ALT  168<H>  /  AlkPhos  68  11-08    LIVER FUNCTIONS - ( 2021 07:55 )  Alb: 2.4 g/dL / Pro: 7.1 g/dL / ALK PHOS: 68 U/L / ALT: 168 U/L / AST: 98 U/L / GGT: x                   Imaging:  < from: CT Abdomen and Pelvis w/ IV Cont (21 @ 19:55) >    EXAM:  CT ABDOMEN AND PELVIS IC                          EXAM:  CT CHEST IC                            PROCEDURE DATE:  2021          INTERPRETATION:  CLINICAL INFORMATION: Cough. Fever. Diarrhea. Body aches.    COMPARISON: CT chest from 2019.    CONTRAST/COMPLICATIONS:  IV Contrast: Omnipaque 350   90 cc administered   10 cc discarded  Oral Contrast: NONE  Complications: None reported at time of study completion    PROCEDURE:  CT of the Chest, Abdomen and Pelvis was performed.  Sagittal and coronal reformats were performed.    FINDINGS:  CHEST:  LUNGS AND LARGE AIRWAYS: Emphysema. Small pulmonary nodules, measuring up to 0.5 cm in the right middle lobe (series 2, image 41) and 0.5 cm in the lingula (series 2, 48).  PLEURA: Nopleural effusion.  VESSELS: Atherosclerotic changes of the aorta.  HEART: Heart size is normal. No pericardial effusion.  MEDIASTINUM AND ZACARIAS: No lymphadenopathy.  CHEST WALL AND LOWER NECK: Within normal limits.    ABDOMEN AND PELVIS:  LIVER: Within normal limits.  BILE DUCTS: Normal caliber.  GALLBLADDER: Within normal limits.  SPLEEN: Within normal limits.  PANCREAS: Within normal limits.  ADRENALS: Within normal limits.  KIDNEYS/URETERS: No hydronephrosis. Right lower pole renal cyst.    BLADDER: Within normal limits.  REPRODUCTIVE ORGANS: Prostate is enlarged.    BOWEL: No bowel obstruction. Appendix is normal. Colonic diverticulosis.  PERITONEUM: No ascites.  VESSELS: Atherosclerotic changes.  RETROPERITONEUM/LYMPH NODES: No lymphadenopathy.  ABDOMINAL WALL: Within normal limits.  BONES: Degenerative changes.    IMPRESSION:  No evidence of pneumonia.    No intra-abdominal pathology.    --- End of Report ---      STEFAN MARTINEZ MD; Attending Radiologist  This document has been electronically signed. 2021  8:16PM    < end of copied text >

## 2021-11-08 NOTE — CONSULT NOTE ADULT - ASSESSMENT
Salmonella bacteremia- resolving on antibiotics  Reactive arthritis-improving    No evidence of crystal induced arthropathy  May need PT if increased pain or stiffness  follow CRP  Per Dr Bonilla - for neuro mgmt.  Consider Bone Scan Tc 99 to r/o seeding. Typically gram negative bacteremia is associated with an indolent course 
?Melena  Transaminitis    Pt with resolved diarrhea, now with x2 black stool  FOBT negative  H&h stable  Conservative management, PPI for prophylaxis  Transaminitis, trending down  Suspect multifactorial etiology: salmonella bacteremia, ceftriaxone, and statin could all have contributed  Will follow    Advanced care planning was discussed with patient and family.  Advanced care planning forms were reviewed and discussed.  Risks, benefits and alternatives of gastroenterologic procedures were discussed in detail and all questions were answered.    30 minutes spent.  
75 yo M with PMH BPH, GERD. DM, COPD, CAD, seizures, brain hemangioma s/p laser treatment in 2008, and myasthenia gravis presents to the ED with diarrhea and global joint/muscle pain    copd - emphysema - proventil PRN - symbicort - seasonal vaccination - keep sat > 88 pct    Pulm nodules - sub cm - will need follow up as outpatient - repeat CT chest in 6 months     MG - on mestinon - follows with Neuro - on Prednisone - monitor for weakness -     Salmonella Sepsis - bacteremia - on Rocephin - ID following - cx noted    CAD - [facility] rx regimen - BP control - echo done - report pending    GERD - PPI    Join aches - myalgia and arthralgia - may be related to Salmonella Sepsis    Depression - emotional support    BMI > 30 - may need RLUA eval as outpatient

## 2021-11-08 NOTE — PROGRESS NOTE ADULT - SUBJECTIVE AND OBJECTIVE BOX
Patient is a 76y old  Male who presents with a chief complaint of Fever (08 Nov 2021 12:05)      INTERVAL HPI/OVERNIGHT EVENTS: no acute events overnight. patient seen and examined at bedside. Has no complaints today. Reports feeling better and denies any recurrence of blood in stool or dark stools.     MEDICATIONS  (STANDING):  ALBUTerol    90 MICROgram(s) HFA Inhaler 2 Puff(s) Inhalation every 6 hours  budesonide 160 MICROgram(s)/formoterol 4.5 MICROgram(s) Inhaler 2 Puff(s) Inhalation two times a day  cefTRIAXone   IVPB 2000 milliGRAM(s) IV Intermittent every 24 hours  enoxaparin Injectable 40 milliGRAM(s) SubCutaneous daily  lisinopril 2.5 milliGRAM(s) Oral daily  OXcarbazepine 300 milliGRAM(s) Oral <User Schedule>  predniSONE   Tablet 2.5 milliGRAM(s) Oral daily  pyridostigmine 60 milliGRAM(s) Oral every 4 hours  saccharomyces boulardii 250 milliGRAM(s) Oral two times a day  sodium chloride 0.9%. 1000 milliLiter(s) (50 mL/Hr) IV Continuous <Continuous>    MEDICATIONS  (PRN):  ALBUTerol    90 MICROgram(s) HFA Inhaler 2 Puff(s) Inhalation every 6 hours PRN Shortness of Breath and/or Wheezing  aluminum hydroxide/magnesium hydroxide/simethicone Suspension 30 milliLiter(s) Oral every 4 hours PRN Dyspepsia  melatonin 3 milliGRAM(s) Oral at bedtime PRN Insomnia  ondansetron Injectable 4 milliGRAM(s) IV Push every 8 hours PRN Nausea and/or Vomiting  traMADol 25 milliGRAM(s) Oral every 6 hours PRN Moderate Pain (4 - 6)      Allergies    No Known Allergies    Intolerances        REVIEW OF SYSTEMS:  CONSTITUTIONAL: No fever or chills  HEENT:  No headache, no sore throat  RESPIRATORY: no cough. No wheezing, or shortness of breath  CARDIOVASCULAR: No chest pain, palpitations  GASTROINTESTINAL: No abd pain, nausea, vomiting, or diarrhea.   GENITOURINARY: No dysuria, frequency, or hematuria  NEUROLOGICAL: no dizziness  MUSCULOSKELETAL: no myalgias    Vital Signs Last 24 Hrs  T(C): 36.6 (08 Nov 2021 11:17), Max: 36.8 (07 Nov 2021 19:42)  T(F): 97.9 (08 Nov 2021 11:17), Max: 98.3 (07 Nov 2021 19:42)  HR: 66 (08 Nov 2021 11:17) (66 - 68)  BP: 119/61 (08 Nov 2021 11:17) (119/61 - 136/63)  BP(mean): --  RR: 18 (08 Nov 2021 11:17) (16 - 18)  SpO2: 92% (08 Nov 2021 11:17) (90% - 94%)    PHYSICAL EXAM:  GENERAL: NAD, lying in bed comfortably  HEAD:  Atraumatic, Normocephalic  EYES: EOMI, PERRLA, conjunctiva and sclera clear  ENT: Moist mucous membranes  NECK: Supple, No JVD  CHEST/LUNG: cta bl. No rales, or rhonchi. Unlabored respirations  HEART: Regular rate and rhythm; No murmurs.  ABDOMEN: Bowel sounds present. Soft, Nontender, Mild distention.    EXTREMITIES:  2+ Peripheral Pulses. No clubbing, cyanosis, or edema  NERVOUS SYSTEM:  Alert & Oriented X3, speech clear. No acute deficits   SKIN: No rashes or lesions    LABS:                        11.6   11.07 )-----------( 219      ( 08 Nov 2021 07:55 )             36.4     CBC Full  -  ( 08 Nov 2021 07:55 )  WBC Count : 11.07 K/uL  Hemoglobin : 11.6 g/dL  Hematocrit : 36.4 %  Platelet Count - Automated : 219 K/uL  Mean Cell Volume : 99.2 fl  Mean Cell Hemoglobin : 31.6 pg  Mean Cell Hemoglobin Concentration : 31.9 gm/dL  Auto Neutrophil # : 8.69 K/uL  Auto Lymphocyte # : 1.23 K/uL  Auto Monocyte # : 0.84 K/uL  Auto Eosinophil # : 0.19 K/uL  Auto Basophil # : 0.05 K/uL  Auto Neutrophil % : 78.5 %  Auto Lymphocyte % : 11.1 %  Auto Monocyte % : 7.6 %  Auto Eosinophil % : 1.7 %  Auto Basophil % : 0.5 %    08 Nov 2021 07:55    138    |  104    |  19     ----------------------------<  127    4.1     |  27     |  0.74     Ca    9.3        08 Nov 2021 07:55    TPro  7.1    /  Alb  2.4    /  TBili  0.5    /  DBili  x      /  AST  98     /  ALT  168    /  AlkPhos  68     08 Nov 2021 07:55        CAPILLARY BLOOD GLUCOSE            Culture - Blood (collected 11-06-21 @ 14:45)  Source: .Blood Blood  Preliminary Report (11-07-21 @ 15:01):    No growth to date.    Culture - Blood (collected 11-05-21 @ 00:42)  Source: .Blood Blood-Peripheral  Preliminary Report (11-06-21 @ 01:02):    No growth to date.    Culture - Blood (collected 11-05-21 @ 00:42)  Source: .Blood Blood-Peripheral  Gram Stain (11-05-21 @ 14:04):    Growth in aerobic bottle: Gram Negative Rods  Final Report (11-07-21 @ 09:57):    Growth in aerobic bottle: Salmonella species, not typhi/paratyphi    ***Blood Panel PCR results on this specimen are available    approximately 3 hours after the Gram stain result.***    Gram stain, PCR, and/or culture results may not always    correspond due to difference in methodologies.    ************************************************************    This PCR assay was performed by multiplex PCR. This    Assay tests for 66 bacterial and resistance gene targets.    Please refer to the [facility] test directory    at [email or web address] for details.  Organism: Blood Culture PCR  Salmonella species, not typhi/paratyphi (11-07-21 @ 09:57)  Organism: Salmonella species, not typhi/paratyphi (11-07-21 @ 09:55)      -  Ciprofloxacin: I 0.125      Method Type: ETEST  Organism: Salmonella species, not typhi/paratyphi (11-07-21 @ 09:55)      -  Ampicillin: S <=8 These ampicillin results predict results for amoxicillin      -  Ceftriaxone: S <=1      -  Ciprofloxacin: S <=1      -  Trimethoprim/Sulfamethoxazole: S <=0.5/9.5      Method Type: STEWART  Organism: Blood Culture PCR (11-05-21 @ 16:27)      -  Salmonella species: Detec      Method Type: PCR    Culture - Urine (collected 11-05-21 @ 00:40)  Source: Clean Catch Clean Catch (Midstream)  Final Report (11-05-21 @ 22:27):    <10,000 CFU/mL Normal Urogenital Katie        RADIOLOGY & ADDITIONAL TESTS:    Personally reviewed.     Consultant(s) Notes Reviewed:  [x] YES  [ ] NO

## 2021-11-09 VITALS
DIASTOLIC BLOOD PRESSURE: 74 MMHG | RESPIRATION RATE: 18 BRPM | HEART RATE: 66 BPM | OXYGEN SATURATION: 94 % | TEMPERATURE: 98 F | SYSTOLIC BLOOD PRESSURE: 150 MMHG

## 2021-11-09 LAB
ALBUMIN SERPL ELPH-MCNC: 2.3 G/DL — LOW (ref 3.3–5)
ALP SERPL-CCNC: 62 U/L — SIGNIFICANT CHANGE UP (ref 40–120)
ALT FLD-CCNC: 135 U/L — HIGH (ref 12–78)
ANION GAP SERPL CALC-SCNC: 7 MMOL/L — SIGNIFICANT CHANGE UP (ref 5–17)
AST SERPL-CCNC: 62 U/L — HIGH (ref 15–37)
BASOPHILS # BLD AUTO: 0.04 K/UL — SIGNIFICANT CHANGE UP (ref 0–0.2)
BASOPHILS NFR BLD AUTO: 0.4 % — SIGNIFICANT CHANGE UP (ref 0–2)
BILIRUB SERPL-MCNC: 0.4 MG/DL — SIGNIFICANT CHANGE UP (ref 0.2–1.2)
BUN SERPL-MCNC: 15 MG/DL — SIGNIFICANT CHANGE UP (ref 7–23)
CALCIUM SERPL-MCNC: 8.9 MG/DL — SIGNIFICANT CHANGE UP (ref 8.5–10.1)
CHLORIDE SERPL-SCNC: 104 MMOL/L — SIGNIFICANT CHANGE UP (ref 96–108)
CO2 SERPL-SCNC: 28 MMOL/L — SIGNIFICANT CHANGE UP (ref 22–31)
CREAT SERPL-MCNC: 0.65 MG/DL — SIGNIFICANT CHANGE UP (ref 0.5–1.3)
EOSINOPHIL # BLD AUTO: 0.29 K/UL — SIGNIFICANT CHANGE UP (ref 0–0.5)
EOSINOPHIL NFR BLD AUTO: 3.1 % — SIGNIFICANT CHANGE UP (ref 0–6)
GLUCOSE SERPL-MCNC: 98 MG/DL — SIGNIFICANT CHANGE UP (ref 70–99)
HCT VFR BLD CALC: 36.8 % — LOW (ref 39–50)
HGB BLD-MCNC: 11.5 G/DL — LOW (ref 13–17)
IMM GRANULOCYTES NFR BLD AUTO: 0.5 % — SIGNIFICANT CHANGE UP (ref 0–1.5)
LYMPHOCYTES # BLD AUTO: 1.9 K/UL — SIGNIFICANT CHANGE UP (ref 1–3.3)
LYMPHOCYTES # BLD AUTO: 20.4 % — SIGNIFICANT CHANGE UP (ref 13–44)
MAGNESIUM SERPL-MCNC: 2.2 MG/DL — SIGNIFICANT CHANGE UP (ref 1.6–2.6)
MCHC RBC-ENTMCNC: 30.9 PG — SIGNIFICANT CHANGE UP (ref 27–34)
MCHC RBC-ENTMCNC: 31.3 GM/DL — LOW (ref 32–36)
MCV RBC AUTO: 98.9 FL — SIGNIFICANT CHANGE UP (ref 80–100)
MONOCYTES # BLD AUTO: 0.76 K/UL — SIGNIFICANT CHANGE UP (ref 0–0.9)
MONOCYTES NFR BLD AUTO: 8.2 % — SIGNIFICANT CHANGE UP (ref 2–14)
NEUTROPHILS # BLD AUTO: 6.26 K/UL — SIGNIFICANT CHANGE UP (ref 1.8–7.4)
NEUTROPHILS NFR BLD AUTO: 67.4 % — SIGNIFICANT CHANGE UP (ref 43–77)
NRBC # BLD: 0 /100 WBCS — SIGNIFICANT CHANGE UP (ref 0–0)
PHOSPHATE SERPL-MCNC: 3.1 MG/DL — SIGNIFICANT CHANGE UP (ref 2.5–4.5)
PLATELET # BLD AUTO: 222 K/UL — SIGNIFICANT CHANGE UP (ref 150–400)
POTASSIUM SERPL-MCNC: 4 MMOL/L — SIGNIFICANT CHANGE UP (ref 3.5–5.3)
POTASSIUM SERPL-SCNC: 4 MMOL/L — SIGNIFICANT CHANGE UP (ref 3.5–5.3)
PROT SERPL-MCNC: 6.7 G/DL — SIGNIFICANT CHANGE UP (ref 6–8.3)
RBC # BLD: 3.72 M/UL — LOW (ref 4.2–5.8)
RBC # FLD: 14.9 % — HIGH (ref 10.3–14.5)
SODIUM SERPL-SCNC: 139 MMOL/L — SIGNIFICANT CHANGE UP (ref 135–145)
WBC # BLD: 9.3 K/UL — SIGNIFICANT CHANGE UP (ref 3.8–10.5)
WBC # FLD AUTO: 9.3 K/UL — SIGNIFICANT CHANGE UP (ref 3.8–10.5)

## 2021-11-09 PROCEDURE — 71260 CT THORAX DX C+: CPT | Mod: MA

## 2021-11-09 PROCEDURE — 92610 EVALUATE SWALLOWING FUNCTION: CPT

## 2021-11-09 PROCEDURE — 83735 ASSAY OF MAGNESIUM: CPT

## 2021-11-09 PROCEDURE — 87077 CULTURE AEROBIC IDENTIFY: CPT

## 2021-11-09 PROCEDURE — 99285 EMERGENCY DEPT VISIT HI MDM: CPT

## 2021-11-09 PROCEDURE — 97116 GAIT TRAINING THERAPY: CPT

## 2021-11-09 PROCEDURE — 86769 SARS-COV-2 COVID-19 ANTIBODY: CPT

## 2021-11-09 PROCEDURE — 76700 US EXAM ABDOM COMPLETE: CPT

## 2021-11-09 PROCEDURE — 99239 HOSP IP/OBS DSCHRG MGMT >30: CPT | Mod: GC

## 2021-11-09 PROCEDURE — 73562 X-RAY EXAM OF KNEE 3: CPT

## 2021-11-09 PROCEDURE — 85025 COMPLETE CBC W/AUTO DIFF WBC: CPT

## 2021-11-09 PROCEDURE — 85610 PROTHROMBIN TIME: CPT

## 2021-11-09 PROCEDURE — 87040 BLOOD CULTURE FOR BACTERIA: CPT

## 2021-11-09 PROCEDURE — 94150 VITAL CAPACITY TEST: CPT

## 2021-11-09 PROCEDURE — 97163 PT EVAL HIGH COMPLEX 45 MIN: CPT

## 2021-11-09 PROCEDURE — 85652 RBC SED RATE AUTOMATED: CPT

## 2021-11-09 PROCEDURE — 71045 X-RAY EXAM CHEST 1 VIEW: CPT

## 2021-11-09 PROCEDURE — 0225U NFCT DS DNA&RNA 21 SARSCOV2: CPT

## 2021-11-09 PROCEDURE — 87150 DNA/RNA AMPLIFIED PROBE: CPT

## 2021-11-09 PROCEDURE — 83690 ASSAY OF LIPASE: CPT

## 2021-11-09 PROCEDURE — 93005 ELECTROCARDIOGRAM TRACING: CPT

## 2021-11-09 PROCEDURE — 84484 ASSAY OF TROPONIN QUANT: CPT

## 2021-11-09 PROCEDURE — 84550 ASSAY OF BLOOD/URIC ACID: CPT

## 2021-11-09 PROCEDURE — 93971 EXTREMITY STUDY: CPT

## 2021-11-09 PROCEDURE — 81374 HLA I TYPING 1 ANTIGEN LR: CPT

## 2021-11-09 PROCEDURE — 84100 ASSAY OF PHOSPHORUS: CPT

## 2021-11-09 PROCEDURE — 74177 CT ABD & PELVIS W/CONTRAST: CPT | Mod: MA

## 2021-11-09 PROCEDURE — 87086 URINE CULTURE/COLONY COUNT: CPT

## 2021-11-09 PROCEDURE — 97530 THERAPEUTIC ACTIVITIES: CPT

## 2021-11-09 PROCEDURE — 83605 ASSAY OF LACTIC ACID: CPT

## 2021-11-09 PROCEDURE — 87186 SC STD MICRODIL/AGAR DIL: CPT

## 2021-11-09 PROCEDURE — 80053 COMPREHEN METABOLIC PANEL: CPT

## 2021-11-09 PROCEDURE — 93306 TTE W/DOPPLER COMPLETE: CPT

## 2021-11-09 PROCEDURE — 81001 URINALYSIS AUTO W/SCOPE: CPT

## 2021-11-09 PROCEDURE — 85730 THROMBOPLASTIN TIME PARTIAL: CPT

## 2021-11-09 PROCEDURE — 87181 SC STD AGAR DILUTION PER AGT: CPT

## 2021-11-09 PROCEDURE — 36415 COLL VENOUS BLD VENIPUNCTURE: CPT

## 2021-11-09 PROCEDURE — 82270 OCCULT BLOOD FECES: CPT

## 2021-11-09 PROCEDURE — 83880 ASSAY OF NATRIURETIC PEPTIDE: CPT

## 2021-11-09 PROCEDURE — 96375 TX/PRO/DX INJ NEW DRUG ADDON: CPT

## 2021-11-09 PROCEDURE — 86140 C-REACTIVE PROTEIN: CPT

## 2021-11-09 PROCEDURE — 99232 SBSQ HOSP IP/OBS MODERATE 35: CPT

## 2021-11-09 PROCEDURE — 86225 DNA ANTIBODY NATIVE: CPT

## 2021-11-09 PROCEDURE — 82550 ASSAY OF CK (CPK): CPT

## 2021-11-09 PROCEDURE — 94640 AIRWAY INHALATION TREATMENT: CPT

## 2021-11-09 PROCEDURE — 84145 PROCALCITONIN (PCT): CPT

## 2021-11-09 PROCEDURE — 86431 RHEUMATOID FACTOR QUANT: CPT

## 2021-11-09 PROCEDURE — 96374 THER/PROPH/DIAG INJ IV PUSH: CPT

## 2021-11-09 RX ORDER — ROSUVASTATIN CALCIUM 5 MG/1
1 TABLET ORAL
Qty: 0 | Refills: 0 | DISCHARGE

## 2021-11-09 RX ORDER — OXCARBAZEPINE 300 MG/1
1 TABLET, FILM COATED ORAL
Qty: 0 | Refills: 0 | DISCHARGE
Start: 2021-11-09

## 2021-11-09 RX ORDER — MOXIFLOXACIN HYDROCHLORIDE TABLETS, 400 MG 400 MG/1
1 TABLET, FILM COATED ORAL
Qty: 20 | Refills: 0
Start: 2021-11-09 | End: 2021-11-18

## 2021-11-09 RX ORDER — OXCARBAZEPINE 300 MG/1
1 TABLET, FILM COATED ORAL
Qty: 0 | Refills: 0 | DISCHARGE

## 2021-11-09 RX ADMIN — Medication 2.5 MILLIGRAM(S): at 06:13

## 2021-11-09 RX ADMIN — Medication 250 MILLIGRAM(S): at 06:14

## 2021-11-09 RX ADMIN — ALBUTEROL 2 PUFF(S): 90 AEROSOL, METERED ORAL at 06:14

## 2021-11-09 RX ADMIN — PYRIDOSTIGMINE BROMIDE 60 MILLIGRAM(S): 60 SOLUTION ORAL at 09:52

## 2021-11-09 RX ADMIN — PYRIDOSTIGMINE BROMIDE 60 MILLIGRAM(S): 60 SOLUTION ORAL at 14:50

## 2021-11-09 RX ADMIN — LISINOPRIL 2.5 MILLIGRAM(S): 2.5 TABLET ORAL at 06:14

## 2021-11-09 RX ADMIN — OXCARBAZEPINE 300 MILLIGRAM(S): 300 TABLET, FILM COATED ORAL at 14:50

## 2021-11-09 RX ADMIN — BUDESONIDE AND FORMOTEROL FUMARATE DIHYDRATE 2 PUFF(S): 160; 4.5 AEROSOL RESPIRATORY (INHALATION) at 06:14

## 2021-11-09 RX ADMIN — CEFTRIAXONE 100 MILLIGRAM(S): 500 INJECTION, POWDER, FOR SOLUTION INTRAMUSCULAR; INTRAVENOUS at 06:13

## 2021-11-09 RX ADMIN — ALBUTEROL 2 PUFF(S): 90 AEROSOL, METERED ORAL at 14:50

## 2021-11-09 RX ADMIN — PYRIDOSTIGMINE BROMIDE 60 MILLIGRAM(S): 60 SOLUTION ORAL at 06:14

## 2021-11-09 RX ADMIN — OXCARBAZEPINE 300 MILLIGRAM(S): 300 TABLET, FILM COATED ORAL at 06:14

## 2021-11-09 RX ADMIN — ENOXAPARIN SODIUM 40 MILLIGRAM(S): 100 INJECTION SUBCUTANEOUS at 11:43

## 2021-11-09 NOTE — PROGRESS NOTE ADULT - SUBJECTIVE AND OBJECTIVE BOX
Date/Time Patient Seen:  		  Referring MD:   Data Reviewed	       Patient is a 76y old  Male who presents with a chief complaint of Fever (08 Nov 2021 13:50)      Subjective/HPI     PAST MEDICAL & SURGICAL HISTORY:  CAD (coronary artery disease)    Diabetes mellitus    COPD without exacerbation    Benign prostatic hyperplasia    GERD (gastroesophageal reflux disease)    Myasthenia gravis    Seizures    No significant past surgical history          Medication list         MEDICATIONS  (STANDING):  ALBUTerol    90 MICROgram(s) HFA Inhaler 2 Puff(s) Inhalation every 6 hours  budesonide 160 MICROgram(s)/formoterol 4.5 MICROgram(s) Inhaler 2 Puff(s) Inhalation two times a day  cefTRIAXone   IVPB 2000 milliGRAM(s) IV Intermittent every 24 hours  enoxaparin Injectable 40 milliGRAM(s) SubCutaneous daily  lisinopril 2.5 milliGRAM(s) Oral daily  OXcarbazepine 300 milliGRAM(s) Oral <User Schedule>  predniSONE   Tablet 2.5 milliGRAM(s) Oral daily  pyridostigmine 60 milliGRAM(s) Oral every 4 hours  saccharomyces boulardii 250 milliGRAM(s) Oral two times a day  sodium chloride 0.9%. 1000 milliLiter(s) (50 mL/Hr) IV Continuous <Continuous>    MEDICATIONS  (PRN):  ALBUTerol    90 MICROgram(s) HFA Inhaler 2 Puff(s) Inhalation every 6 hours PRN Shortness of Breath and/or Wheezing  aluminum hydroxide/magnesium hydroxide/simethicone Suspension 30 milliLiter(s) Oral every 4 hours PRN Dyspepsia  melatonin 3 milliGRAM(s) Oral at bedtime PRN Insomnia  ondansetron Injectable 4 milliGRAM(s) IV Push every 8 hours PRN Nausea and/or Vomiting  traMADol 25 milliGRAM(s) Oral every 6 hours PRN Moderate Pain (4 - 6)         Vitals log        ICU Vital Signs Last 24 Hrs  T(C): 36.5 (09 Nov 2021 04:14), Max: 36.7 (08 Nov 2021 17:38)  T(F): 97.7 (09 Nov 2021 04:14), Max: 98 (08 Nov 2021 17:38)  HR: 72 (09 Nov 2021 04:14) (64 - 72)  BP: 128/78 (09 Nov 2021 04:14) (119/61 - 128/78)  BP(mean): --  ABP: --  ABP(mean): --  RR: 18 (09 Nov 2021 04:14) (18 - 18)  SpO2: 94% (09 Nov 2021 04:14) (91% - 94%)           Input and Output:  I&O's Detail    07 Nov 2021 07:01  -  08 Nov 2021 07:00  --------------------------------------------------------  IN:    IV PiggyBack: 50 mL    Oral Fluid: 300 mL  Total IN: 350 mL    OUT:  Total OUT: 0 mL    Total NET: 350 mL      08 Nov 2021 07:01  -  09 Nov 2021 06:46  --------------------------------------------------------  IN:  Total IN: 0 mL    OUT:    Voided (mL): 830 mL  Total OUT: 830 mL    Total NET: -830 mL          Lab Data                        11.6   11.07 )-----------( 219      ( 08 Nov 2021 07:55 )             36.4     11-08    138  |  104  |  19  ----------------------------<  127<H>  4.1   |  27  |  0.74    Ca    9.3      08 Nov 2021 07:55    TPro  7.1  /  Alb  2.4<L>  /  TBili  0.5  /  DBili  x   /  AST  98<H>  /  ALT  168<H>  /  AlkPhos  68  11-08            Review of Systems	      Objective     Physical Examination    heart s1s2  lung dec BS  abd soft  head nc  verbal      Pertinent Lab findings & Imaging      Ruthie:  NO   Adequate UO     I&O's Detail    07 Nov 2021 07:01  -  08 Nov 2021 07:00  --------------------------------------------------------  IN:    IV PiggyBack: 50 mL    Oral Fluid: 300 mL  Total IN: 350 mL    OUT:  Total OUT: 0 mL    Total NET: 350 mL      08 Nov 2021 07:01  -  09 Nov 2021 06:46  --------------------------------------------------------  IN:  Total IN: 0 mL    OUT:    Voided (mL): 830 mL  Total OUT: 830 mL    Total NET: -830 mL               Discussed with:     Cultures:	        Radiology                             Not Applicable

## 2021-11-09 NOTE — PROGRESS NOTE ADULT - ASSESSMENT
75 yo M with PMH BPH, GERD. DM, COPD, CAD, seizures, brain hemangioma s/p laser treatment in 2008, and myasthenia gravis presents to the ED with diarrhea and global joint/muscle pain    VS noted  US abd noted -   GI cx noted -   CM notes reviewed - Rolling Walker on DC    RHEUM consult noted - vs noted - recs reviewed -   low normal Sat -   enc use of I amanda  COPD rx regimen on order    copd - emphysema - proventil PRN - symbicort - seasonal vaccination - keep sat > 88 pct    Pulm nodules - sub cm - will need follow up as outpatient - repeat CT chest in 6 months     MG - on mestinon - follows with Neuro - on Prednisone - monitor for weakness -     Salmonella Sepsis - bacteremia - on Rocephin - ID following - cx noted    CAD - [facility] rx regimen - BP control - echo done - report pending    GERD - PPI    Join aches - myalgia and arthralgia - may be related to Salmonella Sepsis    Depression - emotional support    BMI > 30 - may need RULA eval as outpatien

## 2021-11-09 NOTE — PHARMACOTHERAPY INTERVENTION NOTE - COMMENTS
Meds to beds requested by provider.    [facility]    Cipro 500 m tablet by mouth twice a day    Medications delivered by pharmacist at patient's bedside and counseled on indication, directions, and side effects.  Patient verbalized understanding and no further questions

## 2021-11-09 NOTE — PROGRESS NOTE ADULT - SUBJECTIVE AND OBJECTIVE BOX
[facility]  Timoteo Cardona PA-C  [address] [place] [address]  [phone]      INTERVAL HPI/OVERNIGHT EVENTS:  Pt s/e  Pt states he feels well, denies GI complaints  He self-reports stool was more brown this morning    MEDICATIONS  (STANDING):  ALBUTerol    90 MICROgram(s) HFA Inhaler 2 Puff(s) Inhalation every 6 hours  budesonide 160 MICROgram(s)/formoterol 4.5 MICROgram(s) Inhaler 2 Puff(s) Inhalation two times a day  cefTRIAXone   IVPB 2000 milliGRAM(s) IV Intermittent every 24 hours  enoxaparin Injectable 40 milliGRAM(s) SubCutaneous daily  lisinopril 2.5 milliGRAM(s) Oral daily  OXcarbazepine 300 milliGRAM(s) Oral <User Schedule>  predniSONE   Tablet 2.5 milliGRAM(s) Oral daily  pyridostigmine 60 milliGRAM(s) Oral every 4 hours  saccharomyces boulardii 250 milliGRAM(s) Oral two times a day  sodium chloride 0.9%. 1000 milliLiter(s) (50 mL/Hr) IV Continuous <Continuous>    MEDICATIONS  (PRN):  ALBUTerol    90 MICROgram(s) HFA Inhaler 2 Puff(s) Inhalation every 6 hours PRN Shortness of Breath and/or Wheezing  aluminum hydroxide/magnesium hydroxide/simethicone Suspension 30 milliLiter(s) Oral every 4 hours PRN Dyspepsia  melatonin 3 milliGRAM(s) Oral at bedtime PRN Insomnia  ondansetron Injectable 4 milliGRAM(s) IV Push every 8 hours PRN Nausea and/or Vomiting  traMADol 25 milliGRAM(s) Oral every 6 hours PRN Moderate Pain (4 - 6)      Allergies    No Known Allergies    Intolerances        ROS:   General:  No wt loss, fevers, chills, night sweats, fatigue,   Eyes:  Good vision, no reported pain  ENT:  No sore throat, pain, runny nose, dysphagia  CV:  No pain, palpitations, hypo/hypertension  Resp:  No dyspnea, cough, tachypnea, wheezing  GI:  No pain, No nausea, No vomiting, No diarrhea, No constipation, No weight loss, No fever, No pruritis, No rectal bleeding, No tarry stools, No dysphagia,  :  No pain, bleeding, incontinence, nocturia  Muscle:  No pain, weakness  Neuro:  No weakness, tingling, memory problems  Psych:  No fatigue, insomnia, mood problems, depression  Endocrine:  No polyuria, polydipsia, cold/heat intolerance  Heme:  No petechiae, ecchymosis, easy bruisability  Skin:  No rash, tattoos, scars, edema      PHYSICAL EXAM:   Vital Signs:  Vital Signs Last 24 Hrs  T(C): 36.5 (2021 04:14), Max: 36.7 (2021 17:38)  T(F): 97.7 (2021 04:14), Max: 98 (2021 17:38)  HR: 72 (2021 04:14) (64 - 72)  BP: 128/78 (2021 04:14) (119/61 - 128/78)  BP(mean): --  RR: 18 (2021 04:14) (18 - 18)  SpO2: 94% (2021 04:14) (91% - 94%)  Daily     Daily Weight in k.1 (2021 04:14)    GENERAL:  Appears stated age,   HEENT:  NC/AT,    CHEST:  Full & symmetric excursion,   HEART:  Regular rhythm,  ABDOMEN:  Soft, non-tender, non-distended,  EXTEREMITIES:  no cyanosis  SKIN:  No rash  NEURO:  Alert,       LABS:                        11.5   9.30  )-----------( 222      ( 2021 08:05 )             36.8         139  |  104  |  15  ----------------------------<  98  4.0   |  28  |  0.65    Ca    8.9      2021 08:05  Phos  3.1       Mg     2.2         TPro  6.7  /  Alb  2.3<L>  /  TBili  0.4  /  DBili  x   /  AST  62<H>  /  ALT  135<H>  /  AlkPhos  62            RADIOLOGY & ADDITIONAL TESTS:

## 2021-11-09 NOTE — PROGRESS NOTE ADULT - SUBJECTIVE AND OBJECTIVE BOX
[facility]  INFECTIOUS DISEASES   [address] [place] [address]  Tel: [phone]     Fax: [phone]  ======================================================  MD Briseida Ray Kaushal, MD Cho, Michelle, MD   ======================================================    [id number]  JACKIE LÓPEZ     Follow up: Salmonella bacteremia     No more fever, no abdominal pain, normal BM   Appetite is better.     PAST MEDICAL & SURGICAL HISTORY:  CAD (coronary artery disease)  Diabetes mellitus  COPD without exacerbation  Benign prostatic hyperplasia  GERD (gastroesophageal reflux disease)  Myasthenia gravis  Seizures  No significant past surgical history    Social Hx: no smoking, ETOH or drugs    FAMILY HISTORY:  FHx: stroke    Allergies  No Known Allergies    Antibiotics:  none     REVIEW OF SYSTEMS:  CONSTITUTIONAL:  + Fever, no chills  HEENT:  No diplopia or blurred vision.  No sore throat or runny nose.  CARDIOVASCULAR:  No chest pain or SOB.  RESPIRATORY:  No cough, shortness of breath, PND or orthopnea.  GASTROINTESTINAL:  No nausea, vomiting or diarrhea.  GENITOURINARY:  No dysuria, frequency or urgency. No Blood in urine  MUSCULOSKELETAL: pain in left leg and foot  SKIN:  No change in skin, hair or nails.  NEUROLOGIC:  No paresthesias, fasciculations, seizures or weakness.  PSYCHIATRIC:  No disorder of thought or mood.  ENDOCRINE:  No heat or cold intolerance, polyuria or polydipsia.  HEMATOLOGICAL:  No easy bruising or bleeding.     Physical Exam:  Vital Signs Last 24 Hrs  T(C): 36.5 (09 Nov 2021 04:14), Max: 36.7 (08 Nov 2021 17:38)  T(F): 97.7 (09 Nov 2021 04:14), Max: 98 (08 Nov 2021 17:38)  HR: 72 (09 Nov 2021 04:14) (64 - 72)  BP: 128/78 (09 Nov 2021 04:14) (122/61 - 128/78)  BP(mean): --  RR: 18 (09 Nov 2021 04:14) (18 - 18)  SpO2: 94% (09 Nov 2021 04:14) (91% - 94%)  GEN: NAD  HEENT: normocephalic and atraumatic. EOMI. PERRL.    NECK: Supple.  No lymphadenopathy   LUNGS: Clear to auscultation.  HEART: Regular rate and rhythm without murmur.  ABDOMEN: Soft, nontender, and nondistended.  Positive bowel sounds.    : No CVA tenderness  EXTREMITIES: Without any cyanosis, clubbing, rash, lesions or edema.  NEUROLOGIC: grossly intact.  PSYCHIATRIC: Appropriate affect .  SKIN: No ulceration or induration present.    Labs:                        11.5   9.30  )-----------( 222      ( 09 Nov 2021 08:05 )             36.8     11-09    139  |  104  |  15  ----------------------------<  98  4.0   |  28  |  0.65    Ca    8.9      09 Nov 2021 08:05  Phos  3.1     11-09  Mg     2.2     11-09    TPro  6.7  /  Alb  2.3<L>  /  TBili  0.4  /  DBili  x   /  AST  62<H>  /  ALT  135<H>  /  AlkPhos  62  11-09    Culture - Blood (collected 11-06-21 @ 14:45)  Source: .Blood Blood    Culture - Blood (collected 11-05-21 @ 00:42)  Source: .Blood Blood-Peripheral    Culture - Blood (collected 11-05-21 @ 00:42)  Source: .Blood Blood-Peripheral  Gram Stain (11-05-21 @ 14:04):    Growth in aerobic bottle: Gram Negative Rods  Final Report (11-07-21 @ 09:57):    Growth in aerobic bottle: Salmonella species, not typhi/paratyphi    ***Blood Panel PCR results on this specimen are available    approximately 3 hours after the Gram stain result.***    Gram stain, PCR, and/or culture results may not always    correspond due to difference in methodologies.    ************************************************************    This PCR assay was performed by multiplex PCR. This    Assay tests for 66 bacterial and resistance gene targets.    Please refer to the [facility] test directory    at [email or web address] for details.  Organism: Blood Culture PCR  Salmonella species, not typhi/paratyphi (11-07-21 @ 09:57)  Organism: Salmonella species, not typhi/paratyphi (11-07-21 @ 09:55)    Sensitivities:      -  Ciprofloxacin: I 0.125      Method Type: ETEST  Organism: Salmonella species, not typhi/paratyphi (11-07-21 @ 09:55)    Sensitivities:      -  Ampicillin: S <=8 These ampicillin results predict results for amoxicillin      -  Ceftriaxone: S <=1      -  Ciprofloxacin: S <=1      -  Trimethoprim/Sulfamethoxazole: S <=0.5/9.5      Method Type: STEWART  Organism: Blood Culture PCR (11-05-21 @ 16:27)    Sensitivities:      -  Salmonella species: Detec      Method Type: PCR    Culture - Urine (collected 11-05-21 @ 00:40)  Source: Clean Catch Clean Catch (Midstream)  Final Report (11-05-21 @ 22:27):    <10,000 CFU/mL Normal Urogenital Katie    WBC Count: 9.30 K/uL (11-09-21 @ 08:05)  WBC Count: 11.07 K/uL (11-08-21 @ 07:55)  WBC Count: 10.59 K/uL (11-07-21 @ 07:37)  WBC Count: 11.39 K/uL (11-06-21 @ 09:32)  WBC Count: 10.32 K/uL (11-05-21 @ 07:19)  WBC Count: 12.85 K/uL (11-04-21 @ 18:14)    Creatinine, Serum: 0.65 mg/dL (11-09-21 @ 08:05)  Creatinine, Serum: 0.74 mg/dL (11-08-21 @ 07:55)  Creatinine, Serum: 0.95 mg/dL (11-07-21 @ 07:37)  Creatinine, Serum: 0.85 mg/dL (11-06-21 @ 09:32)  Creatinine, Serum: 0.86 mg/dL (11-05-21 @ 07:19)  Creatinine, Serum: 0.97 mg/dL (11-04-21 @ 18:14)    C-Reactive Protein, Serum: 109 mg/L (11-08-21 @ 10:43)  C-Reactive Protein, Serum: 171 mg/L (11-05-21 @ 16:12)    Sedimentation Rate, Erythrocyte: 92 mm/hr (11-08-21 @ 07:55)  Sedimentation Rate, Erythrocyte: 94 mm/hr (11-05-21 @ 11:25)    Procalcitonin, Serum: 0.05 ng/mL (11-05-21 @ 07:19)     SARS-CoV-2: NotDetec (11-04-21 @ 18:14)  Rapid RVP Result: NotDetec (11-04-21 @ 18:14)    COVID-19 PCR: NotDetec (10-18-21 @ 14:15)    All imaging and other data have been reviewed.  < from: CT Chest w/ IV Cont (11.04.21 @ 19:56) >  IMPRESSION:  No evidence of pneumonia.  No intra-abdominal pathology.    Assessment and Plan:   77 yo man with PMH of DM2, CAD, BPH, COPD, Myasthenia gravis and seizures with brain hemangioma s/p laser treatment in 2008, was admitted with fever. He was admitted at [facility] 10/18-10/21 for MG exacerbation and received 3 days of IVIG. On day after admission, he had one bout of watery diarrhea which self-resolved. The only complaint is pain left calf and foot. Since was admitted and was less active will rule out DVTs. No other source of infection at this time, CT chest and abdomen are negative. IVIG can cause fever but usually not after 2 weeks.     1- Salmonella Bacteremia:   - Blood culture x 2 with Salmonella pansensitive on 11/5 ( etest with Intermediate to Cipro?)   - Repeat blood culture negative on 11/6   - UA negative   - LFTs improving.   - Abdominal CT and Chest CT negative   - Ceftriaxone 2gm daily   - Will treat for total 2 weeks   - When ready for discharge can switch to oral Cipro 500mg q12 to complete the course. last day would be 11/19     Will follow PRN.  Discussed with the primary team.    Priya Phillip MD  Division of Infectious Diseases   Cell [phone] between 8am and 6pm   After 6pm and weekends please call ID service at [phone].

## 2021-11-09 NOTE — PROGRESS NOTE ADULT - PROVIDER SPECIALTY LIST ADULT
Infectious Disease
Neurology
Pulmonology
Pulmonology
Gastroenterology
Hospitalist
Infectious Disease
Infectious Disease
Hospitalist
Hospitalist
Neurology
Hospitalist
Pulmonology

## 2021-11-09 NOTE — PROGRESS NOTE ADULT - ASSESSMENT
?Melena  Transaminitis    Pt with resolved diarrhea, now with x2 black stool  FOBT negative  H&h stable  Conservative management, PPI for prophylaxis  Transaminitis, trending down  Suspect multifactorial etiology: salmonella bacteremia, ceftriaxone, and statin could all have contributed  Will follow    Advanced care planning was discussed with patient and family.  Advanced care planning forms were reviewed and discussed.  Risks, benefits and alternatives of gastroenterologic procedures were discussed in detail and all questions were answered.    30 minutes spent.

## 2021-11-09 NOTE — PROGRESS NOTE ADULT - SUBJECTIVE AND OBJECTIVE BOX
Neurology Follow up note    JACKIE LÓPEZ76yMale    HPI:  77 yo M with PMH BPH, GERD. DM, COPD, CAD, seizures, brain hemagioma s/p laser treatment in 2008, and myasthenia gravis presents to the ED with diarrhea. Of note patient was [facility] for MG exacerbation and received 3 days of IVIG. On day after admission, pt developed 1 bout of watery diarrhea which self-resolved, however daughter believes 2/2 decreased PO intake and appetite. He also reports 2 days after admission, he developed muscle pain and weakness located in quadriceps area, 10/10, at rest and with ambulation. Pt states muscle weakness has been so severe he hasn't been able to shave. ROS is positive for fever, abdominal pain, 1 episode of diarrhea (now resolved), decreased appetite, chronic cough, muscle weakness, muscle pain. ROS is negative for chills, n/v, SOB, cp, palpitations, changes in vision or hearing.    In the ED  VS: T101.9 rectal, HR 89, /73, RR 18 SPO2 88% on RA  Labs: WBC 12.85, HH 12.7/38.4, PLTS 183, Na 137, K 4.1, Cr 0.97, Gluc 143, albumin 2.6, lactate 2.3, hs troponin negative x1,   Chest Xray: no acute lung pathology (personal read)  CT chest: pending  CT A/P: pending  EKG: Normal Sinus Rhythm Normal ECG  Given in ED: acetaminophen 650mg PO x1, zosyn, vancomycin 1gm, NS 1.850 L bolus x1 (04 Nov 2021 18:53)      Interval History -no new weakness    Patient is seen, chart was reviewed and case was discussed with the treatment team.  Pt is not in any distress.   Lying on bed comfortably.       Vital Signs Last 24 Hrs  T(C): 36.6 (09 Nov 2021 16:33), Max: 36.8 (09 Nov 2021 13:17)  T(F): 97.9 (09 Nov 2021 16:33), Max: 98.3 (09 Nov 2021 13:17)  HR: 66 (09 Nov 2021 16:33) (66 - 76)  BP: 150/74 (09 Nov 2021 16:33) (117/74 - 150/74)  BP(mean): --  RR: 18 (09 Nov 2021 16:33) (18 - 18)  SpO2: 94% (09 Nov 2021 16:33) (92% - 94%)        REVIEW OF SYSTEMS:    Constitutional: No fever, weight loss or fatigue  Eyes: No eye pain, visual disturbances, or discharge  ENT:  No difficulty hearing, tinnitus, vertigo; No sinus or throat pain  Neck: No pain or stiffness  Respiratory: No cough, wheezing, chills or hemoptysis  Cardiovascular: No chest pain, palpitations, shortness of breath, dizziness or leg swelling  Gastrointestinal: No abdominal or epigastric pain. No nausea, vomiting or hematemesis  Genitourinary: No dysuria, frequency, hematuria or incontinence  Neurological: No headaches, memory loss, l  Psychiatric: No depression, anxiety, mood swings or difficulty sleeping  Musculoskeletal: No joint pain or swelling;   Skin: No itching, burning, rashes or lesions   Lymph Nodes: No enlarged glands  Endocrine: No heat or cold intolerance; No hair loss,  Allergy and Immunologic: No hives or eczema    On Neurological Examination:    Mental Status - Pt is alert, awake, oriented X3.. Follows commands well and able to answer questions appropriately  .Mood and affect  normal    Speech -  Normal      Cranial Nerves - Pupils 3 mm equal and reactive to light, mild ptosis. Pt has no visual field deficit.  Pt has no  facial asymmetry. Facial sensation is intact.  Tongue - is in midline.    Muscle tone - is normal    Motor Exam - 4+/5 all over,   No drift. No shaking or tremors.    Sensory Exam -Pt withdraws all extremities equally on stimulation. No asymmetry seen. No complaints of tingling, numbness.      coordination:    Finger to nose: normal    Deep tendon Reflexes - 2 plus all over.          Neck Supple -  Yes.     MEDICATIONS    acetaminophen     Tablet .. 650 milliGRAM(s) Oral every 6 hours PRN  ALBUTerol    90 MICROgram(s) HFA Inhaler 2 Puff(s) Inhalation every 6 hours PRN  aluminum hydroxide/magnesium hydroxide/simethicone Suspension 30 milliLiter(s) Oral every 4 hours PRN  budesonide 160 MICROgram(s)/formoterol 4.5 MICROgram(s) Inhaler 2 Puff(s) Inhalation two times a day  cefTRIAXone   IVPB 2000 milliGRAM(s) IV Intermittent every 24 hours  enoxaparin Injectable 40 milliGRAM(s) SubCutaneous daily  lisinopril 2.5 milliGRAM(s) Oral daily  melatonin 3 milliGRAM(s) Oral at bedtime PRN  ondansetron Injectable 4 milliGRAM(s) IV Push every 8 hours PRN  OXcarbazepine 300 milliGRAM(s) Oral <User Schedule>  predniSONE   Tablet 2.5 milliGRAM(s) Oral daily  pyridostigmine 60 milliGRAM(s) Oral every 4 hours  saccharomyces boulardii 250 milliGRAM(s) Oral two times a day  sodium chloride 0.9%. 1000 milliLiter(s) IV Continuous <Continuous>  traMADol 25 milliGRAM(s) Oral every 6 hours PRN      Allergies    No Known Allergies    Intolerances                                               11.5   9.30  )-----------( 222      ( 09 Nov 2021 08:05 )             36.8         Hemoglobin A1C:     Vitamin B12     RADIOLOGY    ASSESSMENT AND PLAN:      presented weakness; fever  related salmonella bact  MG -stable    antibiotic as per ID  continue mestinone  Physical therapy evaluation.  OOB to chair/ambulation with assistance only.  Pain is accessed and addressed.  Plan of care was discussed with family. Questions answered.  Would continue to follow up

## 2021-11-10 LAB
CULTURE RESULTS: SIGNIFICANT CHANGE UP
ORGANISM # SPEC MICROSCOPIC CNT: SIGNIFICANT CHANGE UP
SPECIMEN SOURCE: SIGNIFICANT CHANGE UP

## 2021-11-11 ENCOUNTER — RX CHANGE (OUTPATIENT)
Age: 76
End: 2021-11-11

## 2021-11-11 LAB
CULTURE RESULTS: SIGNIFICANT CHANGE UP
SPECIMEN SOURCE: SIGNIFICANT CHANGE UP

## 2021-11-11 RX ORDER — FLUTICASONE PROPIONATE AND SALMETEROL 50; 250 UG/1; UG/1
250-50 POWDER RESPIRATORY (INHALATION)
Qty: 3 | Refills: 1 | Status: ACTIVE | COMMUNITY
Start: 2021-11-11 | End: 1900-01-01

## 2021-11-15 LAB — HLA-B27 QL: NEGATIVE — SIGNIFICANT CHANGE UP

## 2022-03-20 ENCOUNTER — APPOINTMENT (OUTPATIENT)
Dept: DISASTER EMERGENCY | Facility: CLINIC | Age: 77
End: 2022-03-20

## 2022-03-24 ENCOUNTER — APPOINTMENT (OUTPATIENT)
Dept: PULMONOLOGY | Facility: CLINIC | Age: 77
End: 2022-03-24
Payer: MEDICARE

## 2022-03-24 VITALS
HEART RATE: 74 BPM | RESPIRATION RATE: 16 BRPM | DIASTOLIC BLOOD PRESSURE: 74 MMHG | SYSTOLIC BLOOD PRESSURE: 142 MMHG | OXYGEN SATURATION: 91 %

## 2022-03-24 VITALS — BODY MASS INDEX: 34.73 KG/M2 | HEIGHT: 63 IN | WEIGHT: 196 LBS

## 2022-03-24 PROCEDURE — 99214 OFFICE O/P EST MOD 30 MIN: CPT | Mod: 25

## 2022-03-24 PROCEDURE — 94010 BREATHING CAPACITY TEST: CPT

## 2022-03-24 NOTE — END OF VISIT
[FreeTextEntry3] : Chest CT reviewed on PACS [Time Spent: ___ minutes] : I have spent [unfilled] minutes of time on the encounter.

## 2022-03-24 NOTE — DISCUSSION/SUMMARY
[COPD] : chronic obstructive pulmonary disease [Stable] : stable [PFTs] : pulmonary function tests [Chest CT] : chest CT [Medication Changes Per Orders] : Medication changes are as documented in orders [Supplemental Oxygen] : supplemental oxygen [FreeTextEntry1] : Pulmonary nodules benign.  No further work-up indicated\par \par  [Oxygen-Dependent] : oxygen-dependent [Stage IV (Very Severe)] : stage IV (very severe) [de-identified] : Oxygen recerticifation

## 2022-03-24 NOTE — CONSULT LETTER
[Dear  ___] : Dear  [unfilled], [Consult Letter:] : I had the pleasure of evaluating your patient, [unfilled]. [Please see my note below.] : Please see my note below. [Consult Closing:] : Thank you very much for allowing me to participate in the care of this patient.  If you have any questions, please do not hesitate to contact me. [Sincerely,] : Sincerely, [FreeTextEntry3] : Maurice Rose MD FCCP\par Pulmonary/Critical Care/Sleep Medicine\par Department of Internal Medicine\par \par [facility] [employer]\par

## 2022-03-24 NOTE — HISTORY OF PRESENT ILLNESS
[Former] : former [Intermittent] : Intermittent [NC] : Nasal Cannula [24 hrs] : 24 hours/day [Coughing Up Sputum] : resolved coughing up sputum [Wheezing] : resolved wheezing [Regional Soft Tissue Swelling Both Lower Extremities] : denies lower extremity edema [___ Times a Day] : of [unfilled] time(s) a day [None] : None [FreeTextEntry1] : 3 [FreeTextEntry4] : [employer] [Difficulty Breathing During Exertion] : denies dyspnea on exertion [Feelings Of Weakness On Exertion] : denies exercise intolerance [Cough] : denies coughing [Fever] : no fever [Wt Gain ___ Lbs] : no recent weight gain [Adherent] : the patient is not adherent with ~his/her~ medication regimen [de-identified] : Bladder cancer, Quintero's esophagus, gastroesophageal reflux, prior, pulmonary nodules, Myasthenia  [FreeTextEntry3] : forgets to take

## 2022-08-02 ENCOUNTER — RX CHANGE (OUTPATIENT)
Age: 77
End: 2022-08-02

## 2022-09-12 ENCOUNTER — RX RENEWAL (OUTPATIENT)
Age: 77
End: 2022-09-12

## 2022-09-26 ENCOUNTER — APPOINTMENT (OUTPATIENT)
Dept: PULMONOLOGY | Facility: CLINIC | Age: 77
End: 2022-09-26

## 2023-02-13 ENCOUNTER — EMERGENCY (EMERGENCY)
Facility: HOSPITAL | Age: 78
LOS: 1 days | Discharge: HOME | End: 2023-02-13
Attending: EMERGENCY MEDICINE
Payer: MEDICARE

## 2023-02-13 VITALS
HEART RATE: 56 BPM | RESPIRATION RATE: 18 BRPM | DIASTOLIC BLOOD PRESSURE: 79 MMHG | OXYGEN SATURATION: 95 % | SYSTOLIC BLOOD PRESSURE: 131 MMHG | TEMPERATURE: 98 F

## 2023-02-13 VITALS
WEIGHT: 199.96 LBS | DIASTOLIC BLOOD PRESSURE: 71 MMHG | OXYGEN SATURATION: 98 % | TEMPERATURE: 98 F | SYSTOLIC BLOOD PRESSURE: 123 MMHG | HEIGHT: 63 IN | HEART RATE: 60 BPM | RESPIRATION RATE: 20 BRPM

## 2023-02-13 LAB
ALBUMIN SERPL ELPH-MCNC: 4.1 G/DL — SIGNIFICANT CHANGE UP (ref 3.3–5.2)
ALP SERPL-CCNC: 78 U/L — SIGNIFICANT CHANGE UP (ref 40–120)
ALT FLD-CCNC: 17 U/L — SIGNIFICANT CHANGE UP
ANION GAP SERPL CALC-SCNC: 10 MMOL/L — SIGNIFICANT CHANGE UP (ref 5–17)
AST SERPL-CCNC: 24 U/L — SIGNIFICANT CHANGE UP
BASOPHILS # BLD AUTO: 0.03 K/UL — SIGNIFICANT CHANGE UP (ref 0–0.2)
BASOPHILS NFR BLD AUTO: 0.4 % — SIGNIFICANT CHANGE UP (ref 0–2)
BILIRUB SERPL-MCNC: 0.7 MG/DL — SIGNIFICANT CHANGE UP (ref 0.4–2)
BUN SERPL-MCNC: 11.4 MG/DL — SIGNIFICANT CHANGE UP (ref 8–20)
CALCIUM SERPL-MCNC: 9 MG/DL — SIGNIFICANT CHANGE UP (ref 8.4–10.5)
CHLORIDE SERPL-SCNC: 96 MMOL/L — SIGNIFICANT CHANGE UP (ref 96–108)
CK SERPL-CCNC: 99 U/L — SIGNIFICANT CHANGE UP (ref 30–200)
CO2 SERPL-SCNC: 28 MMOL/L — SIGNIFICANT CHANGE UP (ref 22–29)
CREAT SERPL-MCNC: 0.54 MG/DL — SIGNIFICANT CHANGE UP (ref 0.5–1.3)
CRP SERPL-MCNC: 16 MG/L — HIGH
EGFR: 101 ML/MIN/1.73M2 — SIGNIFICANT CHANGE UP
EOSINOPHIL # BLD AUTO: 0.16 K/UL — SIGNIFICANT CHANGE UP (ref 0–0.5)
EOSINOPHIL NFR BLD AUTO: 2.1 % — SIGNIFICANT CHANGE UP (ref 0–6)
ERYTHROCYTE [SEDIMENTATION RATE] IN BLOOD: 37 MM/HR — HIGH (ref 0–20)
GLUCOSE SERPL-MCNC: 109 MG/DL — HIGH (ref 70–99)
HCT VFR BLD CALC: 43.3 % — SIGNIFICANT CHANGE UP (ref 39–50)
HGB BLD-MCNC: 14.4 G/DL — SIGNIFICANT CHANGE UP (ref 13–17)
IMM GRANULOCYTES NFR BLD AUTO: 0.4 % — SIGNIFICANT CHANGE UP (ref 0–0.9)
LACTATE BLDV-MCNC: 1.4 MMOL/L — SIGNIFICANT CHANGE UP (ref 0.5–2)
LYMPHOCYTES # BLD AUTO: 1.69 K/UL — SIGNIFICANT CHANGE UP (ref 1–3.3)
LYMPHOCYTES # BLD AUTO: 22.5 % — SIGNIFICANT CHANGE UP (ref 13–44)
MCHC RBC-ENTMCNC: 32.4 PG — SIGNIFICANT CHANGE UP (ref 27–34)
MCHC RBC-ENTMCNC: 33.3 GM/DL — SIGNIFICANT CHANGE UP (ref 32–36)
MCV RBC AUTO: 97.3 FL — SIGNIFICANT CHANGE UP (ref 80–100)
MONOCYTES # BLD AUTO: 0.64 K/UL — SIGNIFICANT CHANGE UP (ref 0–0.9)
MONOCYTES NFR BLD AUTO: 8.5 % — SIGNIFICANT CHANGE UP (ref 2–14)
NEUTROPHILS # BLD AUTO: 4.95 K/UL — SIGNIFICANT CHANGE UP (ref 1.8–7.4)
NEUTROPHILS NFR BLD AUTO: 66.1 % — SIGNIFICANT CHANGE UP (ref 43–77)
PLATELET # BLD AUTO: 168 K/UL — SIGNIFICANT CHANGE UP (ref 150–400)
POTASSIUM SERPL-MCNC: 4.8 MMOL/L — SIGNIFICANT CHANGE UP (ref 3.5–5.3)
POTASSIUM SERPL-SCNC: 4.8 MMOL/L — SIGNIFICANT CHANGE UP (ref 3.5–5.3)
PROT SERPL-MCNC: 7.3 G/DL — SIGNIFICANT CHANGE UP (ref 6.6–8.7)
RBC # BLD: 4.45 M/UL — SIGNIFICANT CHANGE UP (ref 4.2–5.8)
RBC # FLD: 15 % — HIGH (ref 10.3–14.5)
SODIUM SERPL-SCNC: 134 MMOL/L — LOW (ref 135–145)
WBC # BLD: 7.5 K/UL — SIGNIFICANT CHANGE UP (ref 3.8–10.5)
WBC # FLD AUTO: 7.5 K/UL — SIGNIFICANT CHANGE UP (ref 3.8–10.5)

## 2023-02-13 PROCEDURE — 36415 COLL VENOUS BLD VENIPUNCTURE: CPT

## 2023-02-13 PROCEDURE — 99284 EMERGENCY DEPT VISIT MOD MDM: CPT | Mod: 25

## 2023-02-13 PROCEDURE — 73130 X-RAY EXAM OF HAND: CPT | Mod: 26,RT

## 2023-02-13 PROCEDURE — 90471 IMMUNIZATION ADMIN: CPT

## 2023-02-13 PROCEDURE — 85025 COMPLETE CBC W/AUTO DIFF WBC: CPT

## 2023-02-13 PROCEDURE — 80053 COMPREHEN METABOLIC PANEL: CPT

## 2023-02-13 PROCEDURE — 99285 EMERGENCY DEPT VISIT HI MDM: CPT | Mod: 25

## 2023-02-13 PROCEDURE — 73130 X-RAY EXAM OF HAND: CPT

## 2023-02-13 PROCEDURE — 82550 ASSAY OF CK (CPK): CPT

## 2023-02-13 PROCEDURE — 90715 TDAP VACCINE 7 YRS/> IM: CPT

## 2023-02-13 PROCEDURE — 29125 APPL SHORT ARM SPLINT STATIC: CPT

## 2023-02-13 PROCEDURE — 29125 APPL SHORT ARM SPLINT STATIC: CPT | Mod: RT

## 2023-02-13 PROCEDURE — 83605 ASSAY OF LACTIC ACID: CPT

## 2023-02-13 PROCEDURE — 86140 C-REACTIVE PROTEIN: CPT

## 2023-02-13 PROCEDURE — 96374 THER/PROPH/DIAG INJ IV PUSH: CPT | Mod: XU

## 2023-02-13 PROCEDURE — 85652 RBC SED RATE AUTOMATED: CPT

## 2023-02-13 RX ORDER — CEPHALEXIN 500 MG
1 CAPSULE ORAL
Qty: 15 | Refills: 0
Start: 2023-02-13 | End: 2023-02-17

## 2023-02-13 RX ORDER — TETANUS TOXOID, REDUCED DIPHTHERIA TOXOID AND ACELLULAR PERTUSSIS VACCINE, ADSORBED 5; 2.5; 8; 8; 2.5 [IU]/.5ML; [IU]/.5ML; UG/.5ML; UG/.5ML; UG/.5ML
0.5 SUSPENSION INTRAMUSCULAR ONCE
Refills: 0 | Status: COMPLETED | OUTPATIENT
Start: 2023-02-13 | End: 2023-02-13

## 2023-02-13 RX ORDER — KETOROLAC TROMETHAMINE 30 MG/ML
15 SYRINGE (ML) INJECTION ONCE
Refills: 0 | Status: DISCONTINUED | OUTPATIENT
Start: 2023-02-13 | End: 2023-02-13

## 2023-02-13 RX ORDER — IBUPROFEN 200 MG
1 TABLET ORAL
Qty: 15 | Refills: 0
Start: 2023-02-13 | End: 2023-02-17

## 2023-02-13 RX ADMIN — Medication 15 MILLIGRAM(S): at 14:47

## 2023-02-13 RX ADMIN — Medication 15 MILLIGRAM(S): at 15:23

## 2023-02-13 RX ADMIN — TETANUS TOXOID, REDUCED DIPHTHERIA TOXOID AND ACELLULAR PERTUSSIS VACCINE, ADSORBED 0.5 MILLILITER(S): 5; 2.5; 8; 8; 2.5 SUSPENSION INTRAMUSCULAR at 17:57

## 2023-02-13 NOTE — ED ADULT NURSE NOTE - NSIMPLEMENTINTERV_GEN_ALL_ED
Implemented All Fall Risk Interventions:  [place] to call system. Call bell, personal items and telephone within reach. Instruct patient to call for assistance. Room bathroom lighting operational. Non-slip footwear when patient is off stretcher. Physically safe environment: no spills, clutter or unnecessary equipment. Stretcher in lowest position, wheels locked, appropriate side rails in place. Provide visual cue, wrist band, yellow gown, etc. Monitor gait and stability. Monitor for mental status changes and reorient to person, place, and time. Review medications for side effects contributing to fall risk. Reinforce activity limits and safety measures with patient and family. Implemented All Fall Risk Interventions:  [place] to call system. Call bell, personal items and telephone within reach. Instruct patient to call for assistance. Room bathroom lighting operational. Non-slip footwear when patient is off stretcher. Physically safe environment: no spills, clutter or unnecessary equipment. Stretcher in lowest position, wheels locked, appropriate side rails in place. Provide visual cue, wrist band, yellow gown, etc. Monitor gait and stability. Monitor for mental status changes and reorient to person, place, and time. Review medications for side effects contributing to fall risk. Reinforce activity limits and safety measures with patient and family. Implemented All Fall Risk Interventions:  [place] to call system. Call bell, personal items and telephone within reach. Instruct patient to call for assistance. Room bathroom lighting operational. Non-slip footwear when patient is off stretcher. Physically safe environment: no spills, clutter or unnecessary equipment. Stretcher in lowest position, wheels locked, appropriate side rails in place. Provide visual cue, wrist band, yellow gown, etc. Monitor gait and stability. Monitor for mental status changes and reorient to person, place, and time. Review medications for side effects contributing to fall risk. Reinforce activity limits and safety measures with patient and family.

## 2023-02-13 NOTE — ED PROVIDER NOTE - PHYSICAL EXAMINATION
General: Awake, alert, lying in bed in NAD  HEENT: Normocephalic, atraumatic. No scleral icterus or conjunctival injection. EOMI. Moist mucous membranes. Oropharynx clear.   Neck:. Soft and supple.  Cardiac: RRR, +S1/S2. No murmurs, rubs, gallops. Peripheral pulses 2+ and symmetric. No LE edema.  Resp: Lungs CTAB. Speaking in full sentences. No accessory muscle use  Abd: Soft, non-tender, non-distended. No guarding, rebound, or rigidity.  Back: Spine midline and non-tender. No CVA tenderness.    Extremities: R hand with swelling to palm and 2nd-4th digits. Tenderness along base of 2nd-4th digits. Minimal erythema to hand  Skin: No rashes, abrasions, or lacerations.  Neuro: AO x 4. Moves all extremities symmetrically. Motor strength and sensation grossly intact.  Psych: Appropriate mood and affect

## 2023-02-13 NOTE — ED PROVIDER NOTE - CARE PROVIDERS DIRECT ADDRESSES
,[email or web address]

## 2023-02-13 NOTE — ED PROVIDER NOTE - CARE PROVIDER_API CALL
Roshni Pineda)  Orthopaedic Surgery  [address], [place] [address]  Phone: [phone]  Fax: [phone]  Scheduled Appointment: 02/18/2023   Roshni Pineda)  Orthopaedic Surgery  [address], [place] [address]  Phone: [phone]  Fax: [phone]  Scheduled Appointment: 02/18/2023   Roshni Pineda)  Orthopaedic Surgery  [address], [place] [address]  Phone: [phone]  Fax: [phone]  Scheduled Appointment: 02/18/2023

## 2023-02-13 NOTE — ED ADULT NURSE NOTE - OBJECTIVE STATEMENT
[place] to ER for increased right hand swelling and 6/10 pain for 2 weeks pt denies any recent injury. Pt states "I think I cut myself with a metal brush" pt denies any loss of sensation distal to swelling pt is alert and oriented x3. breathing even and labored. denies fever or chills.

## 2023-02-13 NOTE — ED PROVIDER NOTE - NS ED ROS FT
General: Denies fever, chills  HEENT: Denies sore throat  Neck: Denies neck pain  Resp: Denies coughing, SOB  Cardiovascular: Denies CP, palpitations, LE edema  GI: Denies nausea, vomiting, abdominal pain, diarrhea, constipation, blood in stool  : Denies dysuria, hematuria, frequency, incontinence  MSK: R hand swelling/pain. Denies back pain  Neuro: Denies HA, dizziness, numbness, weakness  Skin: Denies rashes.

## 2023-02-13 NOTE — ED PROVIDER NOTE - ATTENDING CONTRIBUTION TO CARE
77yoM; with PMH signif for Myasthenia Gravis, HTN, NIDDM; now p/w right hand swelling and pain s/p putting hand into a bucket full of tools 2 weeks ago and having hand get scratched with trace blood oozing from 5th digit at that time. reports swelling over dorsum of hand over past week with increaing pain with attempting to flex/close hand.  denies numbness/tingling. denies f/c/s. denies n/v.   EXAM:  General:     NAD  Head:     NC/AT, EOMI, oral mucosa moist  Neck:     trachea midline  Lungs:     CTA b/l, no w/r/r  CVS:     S1S2, RRR, no m/g/r  Abd:     +BS, s/nt/nd, no organomegaly  Ext:    2+ radial and ulnar pulses intact b/l UE.  R UE:  ttp over 3rd and 4th digit mcp over palmar surface and edema over dorsum of hand, with no erythema.  unable to oppose thumb to 5th digit on right hand.  A/P:  77yoM p/w right hand swelling.    -xray, pain control, labs, hand cs

## 2023-02-13 NOTE — ED PROVIDER NOTE - OBJECTIVE STATEMENT
77M hx myasthenia gravis, HTN, NIDDM 77M hx myasthenia gravis, HTN, NIDDM presents with about 2 weeks of R hand swelling and pain. Pt states sx initially began after he accidentally cut himself on his R 5th finger with a metal brush, and several days afterwards he began experiencing worsening swelling across his hand. The pain is mostly in his 2nd and 3rd fingers. No fever, chills, leg pain/swelling, CP, SOB.

## 2023-02-13 NOTE — CONSULT NOTE ADULT - SUBJECTIVE AND OBJECTIVE BOX
ORTHO-HAND SERVICE    Pt Name: JACKIE LÓPEZ    MRN: [id number]      Patient is a 77y Male HTN, DM presenting to the emergency department with a chief complaint of right hand pain and progressive swelling of right hand.  Patient reports approx 2 weeks ago he had reached into a metal tool bucket and a wire brush abrased his skin over his 5th digit. Several days after he began experiencing worsening swelling across the dorsum of his right hand. Denies fever, chills SOB    REVIEW OF SYSTEMS    General: Alert, responsive, in NAD    Skin/Breast: No rashes, no pruritis   	  Ophthalmologic: No visual changes. No redness.   	  ENMT:	No discharge. No swelling.    Respiratory and Thorax: No difficulty breathing. No cough.  	   Cardiovascular: No chest pain. No palpitations.    Gastrointestinal: No abdominal pain. No diarrhea.     Genitourinary: No dysuria. No bleeding.    Musculoskeletal: SEE HPI.    Neurological: No sensory or motor changes.     Psychiatric: No anxiety or depression.    Hematology/Lymphatics: No swelling.    PAST MEDICAL & SURGICAL HISTORY:  PAST MEDICAL & SURGICAL HISTORY:  H/O myasthenia gravis      HTN (hypertension)      DM (diabetes mellitus)          Allergies: No Known Allergies      Medications:     FAMILY HISTORY:  : non-contributory    Social History:       Daily Height in cm: 160.02 (13 Feb 2023 12:59)    Daily                             14.4   7.50  )-----------( 168      ( 13 Feb 2023 14:05 )             43.3       02-13    134<L>  |  96  |  11.4  ----------------------------<  109<H>  4.8   |  28.0  |  0.54    Ca    9.0      13 Feb 2023 14:05    TPro  7.3  /  Alb  4.1  /  TBili  0.7  /  DBili  x   /  AST  24  /  ALT  17  /  AlkPhos  78  02-13        PHYSICAL EXAM:      Appearance: Alert, responsive, in no acute distress.    Neurological: Sensation is grossly intact to light touch. 5/5 motor function of all extremities. No focal deficits or weaknesses found.    Skin: no rash on visible skin. Skin is clean, dry and intact. No bleeding. No abrasions. No ulcerations.  right hand: Dorsum with mild cellulitis to 3rd digit over MCP.  T  Vascular: 2+ distal pulses. Cap refill < 2 sec. No signs of venous  insufficiency  or stasis. No extremity ulcerations. No cyanosis.    Musculoskeletal:      Right Upper Extremity:   FULL painless ROM of right elbow.  Painless ROM of right wrist  Decreased motion of fingers, unable to fully make a fist.  Tenderness over dorsum of right hand over 3rd MCP. Streaking noted along 3rd digit.    No pain with finger extension    Imaging Studies:  No fracture noted.  A/P:  Pt is a  77y Male with hand cellulitis       PLAN:   *Antibiotics  elevation  will discuss with Dr Pineda ORTHO-HAND SERVICE    Pt Name: JACKIE LÓPEZ    MRN: [id number]      Patient is a 77y Male HTN, DM presenting to the emergency department with a chief complaint of right hand pain and progressive swelling of right hand.  Patient reports approx 2 weeks ago he had reached into a metal tool bucket and a wire brush abrased his skin over his 5th digit. Several days after he began experiencing worsening swelling across the dorsum of his right hand. Denies fever, chills SOB    REVIEW OF SYSTEMS    General: Alert, responsive, in NAD    Skin/Breast: No rashes, no pruritis   	  Ophthalmologic: No visual changes. No redness.   	  ENMT:	No discharge. No swelling.    Respiratory and Thorax: No difficulty breathing. No cough.  	   Cardiovascular: No chest pain. No palpitations.    Gastrointestinal: No abdominal pain. No diarrhea.     Genitourinary: No dysuria. No bleeding.    Musculoskeletal: SEE HPI.    Neurological: No sensory or motor changes.     Psychiatric: No anxiety or depression.    Hematology/Lymphatics: No swelling.    PAST MEDICAL & SURGICAL HISTORY:  PAST MEDICAL & SURGICAL HISTORY:  H/O myasthenia gravis      HTN (hypertension)      DM (diabetes mellitus)          Allergies: No Known Allergies      Medications:     FAMILY HISTORY:  : non-contributory    Social History:       Daily Height in cm: 160.02 (13 Feb 2023 12:59)    Daily                             14.4   7.50  )-----------( 168      ( 13 Feb 2023 14:05 )             43.3       02-13    134<L>  |  96  |  11.4  ----------------------------<  109<H>  4.8   |  28.0  |  0.54    Ca    9.0      13 Feb 2023 14:05    TPro  7.3  /  Alb  4.1  /  TBili  0.7  /  DBili  x   /  AST  24  /  ALT  17  /  AlkPhos  78  02-13        PHYSICAL EXAM:      Appearance: Alert, responsive, in no acute distress.    Neurological: Sensation is grossly intact to light touch. 5/5 motor function of all extremities. No focal deficits or weaknesses found.    Skin: no rash on visible skin. Skin is clean, dry and intact. No bleeding. No abrasions. No ulcerations.  right hand: Dorsum with mild cellulitis to 3rd digit over MCP.  T  Vascular: 2+ distal pulses. Cap refill < 2 sec. No signs of venous  insufficiency  or stasis. No extremity ulcerations. No cyanosis.    Musculoskeletal:      Right Upper Extremity:   FULL painless ROM of right elbow.  Painless ROM of right wrist  Decreased motion of fingers, unable to fully make a fist.  Tenderness over dorsum of right hand over 3rd MCP. Streaking noted along 3rd digit.    No pain with finger extension    Imaging Studies:  No fracture noted.  A/P:  Pt is a  77y Male with hand cellulitis       PLAN:   *Antibiotics  elevation  will discuss with Dr Pineda ORTHO-HAND SERVICE    Pt Name: JACKIE LÓPEZ    MRN: [id number]      Patient is a 77y Male HTN, DM presenting to the emergency department with a chief complaint of right hand pain and progressive swelling of right hand.  Patient reports approx 2 weeks ago he had reached into a metal tool bucket and a wire brush abrased his skin over his 5th digit. Several days after he began experiencing worsening swelling across the dorsum of his right hand. Denies fever, chills SOB    REVIEW OF SYSTEMS    General: Alert, responsive, in NAD    Skin/Breast: No rashes, no pruritis   	  Ophthalmologic: No visual changes. No redness.   	  ENMT:	No discharge. No swelling.    Respiratory and Thorax: No difficulty breathing. No cough.  	   Cardiovascular: No chest pain. No palpitations.    Gastrointestinal: No abdominal pain. No diarrhea.     Genitourinary: No dysuria. No bleeding.    Musculoskeletal: SEE HPI.    Neurological: No sensory or motor changes.     Psychiatric: No anxiety or depression.    Hematology/Lymphatics: No swelling.    PAST MEDICAL & SURGICAL HISTORY:  PAST MEDICAL & SURGICAL HISTORY:  H/O myasthenia gravis      HTN (hypertension)      DM (diabetes mellitus)          Allergies: No Known Allergies      Medications:     FAMILY HISTORY:  : non-contributory    Social History:       Daily Height in cm: 160.02 (13 Feb 2023 12:59)    Daily                             14.4   7.50  )-----------( 168      ( 13 Feb 2023 14:05 )             43.3       02-13    134<L>  |  96  |  11.4  ----------------------------<  109<H>  4.8   |  28.0  |  0.54    Ca    9.0      13 Feb 2023 14:05    TPro  7.3  /  Alb  4.1  /  TBili  0.7  /  DBili  x   /  AST  24  /  ALT  17  /  AlkPhos  78  02-13        PHYSICAL EXAM:      Appearance: Alert, responsive, in no acute distress.    Neurological: Sensation is grossly intact to light touch. 5/5 motor function of all extremities. No focal deficits or weaknesses found.    Skin: no rash on visible skin. Skin is clean, dry and intact. No bleeding. No abrasions. No ulcerations.  right hand: Dorsum with mild cellulitis to 3rd digit over MCP.  T  Vascular: 2+ distal pulses. Cap refill < 2 sec. No signs of venous  insufficiency  or stasis. No extremity ulcerations. No cyanosis.    Musculoskeletal:      Right Upper Extremity:   FULL painless ROM of right elbow.  Painless ROM of right wrist  Decreased motion of fingers, unable to fully make a fist.  Tenderness over dorsum of right hand over 3rd MCP. Streaking noted along 3rd digit.    No pain with finger extension    Imaging Studies:  No fracture noted.  A/P:  Pt is a  77y Male with hand cellulitis       PLAN:   *Antibiotics  elevation  will discuss with Dr Pineda ORTHO-HAND SERVICE    Pt Name: JACKIE LÓPEZ    MRN: [id number]      Patient is a 77y Male HTN, DM presenting to the emergency department with a chief complaint of right hand pain and progressive swelling of right hand.  Patient reports approx 2 weeks ago he had reached into a metal tool bucket and a wire brush abrased his skin over his 5th digit. Several days after he began experiencing worsening swelling across the dorsum of his right hand. Denies fever, chills SOB    REVIEW OF SYSTEMS    General: Alert, responsive, in NAD    Skin/Breast: No rashes, no pruritis   	  Ophthalmologic: No visual changes. No redness.   	  ENMT:	No discharge. No swelling.    Respiratory and Thorax: No difficulty breathing. No cough.  	   Cardiovascular: No chest pain. No palpitations.    Gastrointestinal: No abdominal pain. No diarrhea.     Genitourinary: No dysuria. No bleeding.    Musculoskeletal: SEE HPI.    Neurological: No sensory or motor changes.     Psychiatric: No anxiety or depression.    Hematology/Lymphatics: No swelling.    PAST MEDICAL & SURGICAL HISTORY:  PAST MEDICAL & SURGICAL HISTORY:  H/O myasthenia gravis      HTN (hypertension)      DM (diabetes mellitus)          Allergies: No Known Allergies      Medications:     FAMILY HISTORY:  : non-contributory    Social History:       Daily Height in cm: 160.02 (13 Feb 2023 12:59)    Daily                             14.4   7.50  )-----------( 168      ( 13 Feb 2023 14:05 )             43.3       02-13    134<L>  |  96  |  11.4  ----------------------------<  109<H>  4.8   |  28.0  |  0.54    Ca    9.0      13 Feb 2023 14:05    TPro  7.3  /  Alb  4.1  /  TBili  0.7  /  DBili  x   /  AST  24  /  ALT  17  /  AlkPhos  78  02-13        PHYSICAL EXAM:      Appearance: Alert, responsive, in no acute distress.    Neurological: Sensation is grossly intact to light touch. 5/5 motor function of all extremities. No focal deficits or weaknesses found.    Skin: no rash on visible skin. Skin is clean, dry and intact. No bleeding. No abrasions. No ulcerations.  right hand: Dorsum with mild cellulitis to 3rd digit over MCP.  T  Vascular: 2+ distal pulses. Cap refill < 2 sec. No signs of venous  insufficiency  or stasis. No extremity ulcerations. No cyanosis.    Musculoskeletal:      Right Upper Extremity:   FULL painless ROM of right elbow.  Painless ROM of right wrist  Decreased motion of fingers, unable to fully make a fist.  Tenderness over dorsum of right hand over 3rd MCP. Streaking noted along 3rd digit.    No pain with finger extension    Imaging Studies:  No fracture noted.  A/P:  Pt is a  77y Male with hand cellulitis       PLAN:   *Antibiotics/ Keflex 500 mg Q8 hours for 7 days  elevation  Ibuprofen 800mg Q8 hours  Volar splint  Follow up on Saturday with Dr Pineda in office.   return to ER if condition worsens.   will discuss with Dr Pineda ORTHO-HAND SERVICE    Pt Name: JACKIE LÓPEZ    MRN: [id number]      Patient is a 77y Male HTN, DM presenting to the emergency department with a chief complaint of right hand pain and progressive swelling of right hand.  Patient reports approx 2 weeks ago he had reached into a metal tool bucket and a wire brush abrased his skin over his 5th digit. Several days after he began experiencing worsening swelling across the dorsum of his right hand. Denies fever, chills SOB    REVIEW OF SYSTEMS    General: Alert, responsive, in NAD    Skin/Breast: No rashes, no pruritis   	  Ophthalmologic: No visual changes. No redness.   	  ENMT:	No discharge. No swelling.    Respiratory and Thorax: No difficulty breathing. No cough.  	   Cardiovascular: No chest pain. No palpitations.    Gastrointestinal: No abdominal pain. No diarrhea.     Genitourinary: No dysuria. No bleeding.    Musculoskeletal: SEE HPI.    Neurological: No sensory or motor changes.     Psychiatric: No anxiety or depression.    Hematology/Lymphatics: No swelling.    PAST MEDICAL & SURGICAL HISTORY:  PAST MEDICAL & SURGICAL HISTORY:  H/O myasthenia gravis      HTN (hypertension)      DM (diabetes mellitus)          Allergies: No Known Allergies      Medications:     FAMILY HISTORY:  : non-contributory    Social History:       Daily Height in cm: 160.02 (13 Feb 2023 12:59)    Daily                             14.4   7.50  )-----------( 168      ( 13 Feb 2023 14:05 )             43.3       02-13    134<L>  |  96  |  11.4  ----------------------------<  109<H>  4.8   |  28.0  |  0.54    Ca    9.0      13 Feb 2023 14:05    TPro  7.3  /  Alb  4.1  /  TBili  0.7  /  DBili  x   /  AST  24  /  ALT  17  /  AlkPhos  78  02-13        PHYSICAL EXAM:      Appearance: Alert, responsive, in no acute distress.    Neurological: Sensation is grossly intact to light touch. 5/5 motor function of all extremities. No focal deficits or weaknesses found.    Skin: no rash on visible skin. Skin is clean, dry and intact. No bleeding. No abrasions. No ulcerations.  right hand: Dorsum with mild cellulitis to 3rd digit over MCP.  T  Vascular: 2+ distal pulses. Cap refill < 2 sec. No signs of venous  insufficiency  or stasis. No extremity ulcerations. No cyanosis.    Musculoskeletal:      Right Upper Extremity:   FULL painless ROM of right elbow.  Painless ROM of right wrist  Decreased motion of fingers, unable to fully make a fist.  Tenderness over dorsum of right hand over 3rd MCP. Streaking noted along 3rd digit.    No pain with finger extension    Imaging Studies:  No fracture noted.  A/P:  Pt is a  77y Male with hand cellulitis       PLAN:   *Antibiotics/ Keflex 500 mg Q8 hours for 7 days  elevation  Ibuprofen 800mg Q8 hours  Volar splint  Follow up on Saturday with Dr Pineda in office.   return to ER if condition worsens.   will discuss with Dr Pineda ORTHO-HAND SERVICE    Pt Name: JACKIE LÓPEZ    MRN: [id number]      Patient is a 77y Male HTN, DM presenting to the emergency department with a chief complaint of right hand pain and progressive swelling of right hand.  Patient reports approx 2 weeks ago he had reached into a metal tool bucket and a wire brush abrased his skin over his 5th digit. Several days after he began experiencing worsening swelling across the dorsum of his right hand. Denies fever, chills SOB    REVIEW OF SYSTEMS    General: Alert, responsive, in NAD    Skin/Breast: No rashes, no pruritis   	  Ophthalmologic: No visual changes. No redness.   	  ENMT:	No discharge. No swelling.    Respiratory and Thorax: No difficulty breathing. No cough.  	   Cardiovascular: No chest pain. No palpitations.    Gastrointestinal: No abdominal pain. No diarrhea.     Genitourinary: No dysuria. No bleeding.    Musculoskeletal: SEE HPI.    Neurological: No sensory or motor changes.     Psychiatric: No anxiety or depression.    Hematology/Lymphatics: No swelling.    PAST MEDICAL & SURGICAL HISTORY:  PAST MEDICAL & SURGICAL HISTORY:  H/O myasthenia gravis      HTN (hypertension)      DM (diabetes mellitus)          Allergies: No Known Allergies      Medications:     FAMILY HISTORY:  : non-contributory    Social History:       Daily Height in cm: 160.02 (13 Feb 2023 12:59)    Daily                             14.4   7.50  )-----------( 168      ( 13 Feb 2023 14:05 )             43.3       02-13    134<L>  |  96  |  11.4  ----------------------------<  109<H>  4.8   |  28.0  |  0.54    Ca    9.0      13 Feb 2023 14:05    TPro  7.3  /  Alb  4.1  /  TBili  0.7  /  DBili  x   /  AST  24  /  ALT  17  /  AlkPhos  78  02-13        PHYSICAL EXAM:      Appearance: Alert, responsive, in no acute distress.    Neurological: Sensation is grossly intact to light touch. 5/5 motor function of all extremities. No focal deficits or weaknesses found.    Skin: no rash on visible skin. Skin is clean, dry and intact. No bleeding. No abrasions. No ulcerations.  right hand: Dorsum with mild cellulitis to 3rd digit over MCP.  T  Vascular: 2+ distal pulses. Cap refill < 2 sec. No signs of venous  insufficiency  or stasis. No extremity ulcerations. No cyanosis.    Musculoskeletal:      Right Upper Extremity:   FULL painless ROM of right elbow.  Painless ROM of right wrist  Decreased motion of fingers, unable to fully make a fist.  Tenderness over dorsum of right hand over 3rd MCP. Streaking noted along 3rd digit.    No pain with finger extension    Imaging Studies:  No fracture noted.  A/P:  Pt is a  77y Male with hand cellulitis       PLAN:   *Antibiotics/ Keflex 500 mg Q8 hours for 7 days  elevation  Ibuprofen 800mg Q8 hours  Volar splint  Follow up on Saturday with Dr Pineda in office.   return to ER if condition worsens.   will discuss with Dr Pineda

## 2023-02-13 NOTE — ED PROVIDER NOTE - PATIENT PORTAL LINK FT
You can access the FollowMyHealth Patient Portal offered by [facility] by registering at the following website: [email or web address]. By joining [employer]’s FollowMyHealth portal, you will also be able to view your health information using other applications (apps) compatible with our system. You can access the FollowMyHealth Patient Portal offered by [facility] by registering at the following website: [email or web address]. By joining [facility]’s FollowMyHealth portal, you will also be able to view your health information using other applications (apps) compatible with our system. You can access the FollowMyHealth Patient Portal offered by [facility] by registering at the following website: [email or web address]. By joining [employer]’s FollowMyHealth portal, you will also be able to view your health information using other applications (apps) compatible with our system.

## 2023-02-13 NOTE — ED PROVIDER NOTE - CLINICAL SUMMARY MEDICAL DECISION MAKING FREE TEXT BOX
77M presents with 2 weeks of R hand pain/swelling after injuring it on a metal brush. On exam, pt with swelling to dorsal palm and fingers but minimal erythema. Given hx diabetes, pt at higher risk of developing infection such as cellulitis or osteomyelitis. Will evaluate with hand x-rays, bloodwork including CK, sx control 77M presents with 2 weeks of R hand pain/swelling after injuring it on a metal brush. On exam, pt with swelling to dorsal palm and fingers but minimal erythema. Given hx diabetes, pt at higher risk of developing infection such as cellulitis or osteomyelitis. Will evaluate with hand x-rays, bloodwork including CK, sx control    ED course: Workup showed mildly elevated ESR/CRP, XR grossly unremarkable - no FB noted. Hand was consulted - pt will be treated for hand cellulitis and follow up outpatient this Sat

## 2023-02-13 NOTE — ED PROCEDURE NOTE - CPROC ED TIME OUT STATEMENT1
“Patient's name, , procedure and correct site were confirmed during the [place] Timeout.” “Patient's name, , procedure and correct site were confirmed during the [facility] Timeout.” “Patient's name, , procedure and correct site were confirmed during the [place] Timeout.”

## 2023-02-13 NOTE — ED PROVIDER NOTE - PROGRESS NOTE DETAILS
Ortho recommending abx, ibuprofen, followup outpatient in 5 days Pt placed in volar splint (see procedure note), will dc with rx Fawad García MD

## 2023-03-28 RX ORDER — ALBUTEROL SULFATE 90 UG/1
108 (90 BASE) INHALANT RESPIRATORY (INHALATION)
Qty: 3 | Refills: 3 | Status: ACTIVE | COMMUNITY
Start: 2021-07-21 | End: 1900-01-01

## 2023-03-28 RX ORDER — FLUTICASONE PROPIONATE AND SALMETEROL 50; 250 UG/1; UG/1
250-50 POWDER RESPIRATORY (INHALATION)
Qty: 3 | Refills: 3 | Status: ACTIVE | COMMUNITY
Start: 2022-06-14 | End: 1900-01-01

## 2023-09-22 ENCOUNTER — APPOINTMENT (OUTPATIENT)
Dept: PULMONOLOGY | Facility: CLINIC | Age: 78
End: 2023-09-22
Payer: MEDICARE

## 2023-09-22 VITALS
SYSTOLIC BLOOD PRESSURE: 128 MMHG | OXYGEN SATURATION: 98 % | BODY MASS INDEX: 34.02 KG/M2 | WEIGHT: 192 LBS | HEART RATE: 72 BPM | HEIGHT: 63 IN | RESPIRATION RATE: 18 BRPM | DIASTOLIC BLOOD PRESSURE: 68 MMHG

## 2023-09-22 DIAGNOSIS — Z09 ENCOUNTER FOR FOLLOW-UP EXAMINATION AFTER COMPLETED TREATMENT FOR CONDITIONS OTHER THAN MALIGNANT NEOPLASM: ICD-10-CM

## 2023-09-22 DIAGNOSIS — U07.1 COVID-19: ICD-10-CM

## 2023-09-22 DIAGNOSIS — J18.9 PNEUMONIA, UNSPECIFIED ORGANISM: ICD-10-CM

## 2023-09-22 PROCEDURE — 99215 OFFICE O/P EST HI 40 MIN: CPT

## 2023-11-26 NOTE — ED ADULT NURSE NOTE - ED STAT RN HANDOFF DETAILS
Josselyn Servin (:  1948) is a 66 y.o. female,Established patient, here for evaluation of the following chief complaint(s):  Medicare AWV (Pt is nonfasting/)         ASSESSMENT/PLAN:  1. Medicare annual wellness visit, subsequent  2. Type 2 diabetes mellitus without complication, without long-term current use of insulin (HCC)-cont with diabetic diet   -     Microalbumin / Creatinine Urine Ratio; Future  -     Hemoglobin A1C; Future  3. Essential (primary) hypertension-cont with benicar   -     Comprehensive Metabolic Panel; Future  -     CBC; Future  4. Hyperlipidemia LDL goal <100-cont with lipitor   -     Lipid Panel; Future  5. Obstructive sleep apnea (adult) (pediatric)- not suing her CPAP   6. Sleep disorder, unspecified- can try melatnin   7. Morbid (severe) obesity due to excess calories ([address])- will see where sugars are may be candidate for ozempic   8. Lymphedema- will need to get evaluation for this as treatment for this si wrapping and stockings   -     [facility], [place]      No follow-ups on file. Diabetic diet  Prozac  Lipitor  Benicar,lasix  CPAP  Compression stockings    Subjective   SUBJECTIVE/OBJECTIVE:  HPI    Hypertension ROS: taking medications as instructed, no medication side effects noted, no TIA's, no chest pain on exertion, no dyspnea on exertion, no swelling of ankles. New concerns: BP in office today is 104/60  Pt is followed by Dr. Maite Aguirre. Hyperlipidemia:  Cardiovascular risk analysis - 74 y.o. female LDL goal is under 130. ROS: taking medications as instructed, no medication side effects noted, no TIA's, no chest pain on exertion, no dyspnea on exertion, no swelling of ankles. Tolerating meds, no myalgias or other side effects noted.  New concerns:         Lab Results   Component Value Date     CHOL 195 2022            Lab Results   Component Value Date     TRIG 124 2022            Lab Results   Component Value Date     HDL 58 2022 Report given to Nurse Priscila.

## 2023-12-13 RX ORDER — UMECLIDINIUM 62.5 UG/1
AEROSOL, POWDER ORAL
Refills: 0 | Status: ACTIVE | COMMUNITY

## 2023-12-13 RX ORDER — UMECLIDINIUM 62.5 UG/1
62.5 AEROSOL, POWDER ORAL DAILY
Qty: 3 | Refills: 3 | Status: ACTIVE | COMMUNITY
Start: 2023-12-13 | End: 1900-01-01

## 2023-12-19 ENCOUNTER — APPOINTMENT (OUTPATIENT)
Dept: CT IMAGING | Facility: CLINIC | Age: 78
End: 2023-12-19
Payer: MEDICARE

## 2023-12-19 PROCEDURE — 71250 CT THORAX DX C-: CPT

## 2023-12-23 NOTE — H&P ADULT - NSHPPOAPULMEMBOLUS_GEN_A_CORE
[FreeTextEntry1] : 7 year girl presenting with symptoms likely due to viral syndrome complicated by asthma exacerbation  - provided education regarding dx/CC to family  - recommend albuterol q4h while awake the next 2d, then prn  - steroid course provided to jeremiah exacerbation  - discussed supportive care including but not limited to OTC antipyretics/analgesics, nasal saline, and maintaining hydration. - Return to office if persistent/progressive sx, or new concerns arise - Reviewed red flags that would indicate emergent evaluation  no

## 2023-12-26 ENCOUNTER — APPOINTMENT (OUTPATIENT)
Dept: PULMONOLOGY | Facility: CLINIC | Age: 78
End: 2023-12-26

## 2024-01-02 ENCOUNTER — APPOINTMENT (OUTPATIENT)
Dept: PULMONOLOGY | Facility: CLINIC | Age: 79
End: 2024-01-02
Payer: MEDICARE

## 2024-01-02 VITALS — WEIGHT: 194 LBS | BODY MASS INDEX: 35.7 KG/M2 | HEIGHT: 62 IN

## 2024-01-02 VITALS
DIASTOLIC BLOOD PRESSURE: 70 MMHG | WEIGHT: 194 LBS | RESPIRATION RATE: 16 BRPM | OXYGEN SATURATION: 90 % | HEART RATE: 85 BPM | SYSTOLIC BLOOD PRESSURE: 126 MMHG | BODY MASS INDEX: 35.7 KG/M2 | HEIGHT: 62 IN

## 2024-01-02 DIAGNOSIS — Z99.81 DEPENDENCE ON SUPPLEMENTAL OXYGEN: ICD-10-CM

## 2024-01-02 DIAGNOSIS — R91.8 OTHER NONSPECIFIC ABNORMAL FINDING OF LUNG FIELD: ICD-10-CM

## 2024-01-02 PROBLEM — E11.9 TYPE 2 DIABETES MELLITUS WITHOUT COMPLICATIONS: Chronic | Status: ACTIVE | Noted: 2023-02-13

## 2024-01-02 PROBLEM — Z86.69 PERSONAL HISTORY OF OTHER DISEASES OF THE NERVOUS SYSTEM AND SENSE ORGANS: Chronic | Status: ACTIVE | Noted: 2023-02-13

## 2024-01-02 PROBLEM — I10 ESSENTIAL (PRIMARY) HYPERTENSION: Chronic | Status: ACTIVE | Noted: 2023-02-13

## 2024-01-02 PROCEDURE — 99215 OFFICE O/P EST HI 40 MIN: CPT | Mod: 25

## 2024-01-02 PROCEDURE — 94010 BREATHING CAPACITY TEST: CPT

## 2024-01-02 RX ORDER — PREDNISONE 10 MG/1
10 TABLET ORAL
Qty: 42 | Refills: 0 | Status: ACTIVE | COMMUNITY
Start: 2024-01-02 | End: 1900-01-01

## 2024-01-02 RX ORDER — DEXAMETHASONE ACE/NACL,ISO-OSM 8 MG/ML
VIAL (ML) INJECTION
Refills: 0 | Status: COMPLETED | COMMUNITY

## 2024-01-02 NOTE — HISTORY OF PRESENT ILLNESS
[Intermittent] : Intermittent [NC] : Nasal Cannula [24 hrs] : 24 hours/day [Difficulty Breathing During Exertion] : stable dyspnea on exertion [Feelings Of Weakness On Exertion] : stable exercise intolerance [Cough] : resolved coughing [Coughing Up Sputum] : resolved coughing up sputum [Wheezing] : resolved wheezing [Regional Soft Tissue Swelling Both Lower Extremities] : denies lower extremity edema [___ Times a Day] : of [unfilled] time(s) a day [None] : None [Adherent] : the patient is adherent with ~his/her~ medication regimen [FreeTextEntry1] : 3 [FreeTextEntry4] : [employer] [Fever] : no fever [Wt Gain ___ Lbs] : no recent weight gain [de-identified] : Bladder cancer, Quintero's esophagus, gastroesophageal reflux, prior, pulmonary nodules, Myasthenia  [de-identified] : s/p COVID 9/2023 [FreeTextEntry3] : forgets to take

## 2024-01-02 NOTE — RESULTS/DATA
[TextEntry] : facility: [facility]   Indication: Pulmonary embolism   Dynamic spiral evaluation of the pulmonary arterial tree was performed after intravenous administration of contrast, Omnipaque. Automated exposure control was utilized. Coronal and sagittal reformations were obtained   MIP imaging was performed.   There is no evidence of pulmonary embolism on either side   COPD/bullous emphysema is noted. A patchy inflammatory-type density is noted in the right middle lobe measuring 1.5 cm in greatest dimension. Areas of bronchiectasis are noted at the lung bases, with patchy inflammatory and nodular parenchymal densities as noted on the recent CT scan dated 8/7/2023. These densities appear progressive since the prior CT scan. Interval follow-up evaluation is recommended after appropriate interval therapy.   No evidence of pleural effusion   Heart is normal in size and configuration. Calcification is noted in the aorta. There is dilatation of the proximal descending thoracic aorta to 4.3 cm in greatest dimension. No evidence of hilar lymphadenopathy.   Enlarged middle mediastinal lymph nodes are noted, unchanged. An enlarged subcarinal lymph node measuring 3 cm in greatest dimension is also unchanged. These are more than likely inflammatory.   No adrenal masses are noted. Calcified granuloma is noted in the left hepatic lobe.   Degenerative spondylosis of the thoracic spine was noted.   IMPRESSION:   No evidence of pulmonary embolism   Bullous emphysema   Progressive patchy inflammatory and nodular densities noted since prior CT scan. Close interval follow-up evaluation is recommended after appropriate interval therapy to assess for resolution.   Calcific atherosclerosis   Dilatation of the proximal descending thoracic aorta to 4.3 cm in greatest dimension,   Electronically signed by:  Nestor Rivero MD  9/1/2023 6:02 PM CDT Workstation: [id number]   Signed By:  Nestor Rivero MD on 9/1/2023  7:02 PM

## 2024-01-02 NOTE — CONSULT LETTER
[Dear  ___] : Dear  [unfilled], [Consult Letter:] : I had the pleasure of evaluating your patient, [unfilled]. [Please see my note below.] : Please see my note below. [Consult Closing:] : Thank you very much for allowing me to participate in the care of this patient.  If you have any questions, please do not hesitate to contact me. [Sincerely,] : Sincerely, [FreeTextEntry3] : Maurice Rose MD FCCP\par  Pulmonary/Critical Care/Sleep Medicine\par  Department of Internal Medicine\par  \par  [facility] [employer]\par

## 2024-01-02 NOTE — END OF VISIT
[Time Spent: ___ minutes] : I have spent [unfilled] minutes of time on the encounter. [FreeTextEntry3] :   CT reviewed on PACS.

## 2024-01-02 NOTE — DISCUSSION/SUMMARY
[COPD] : chronic obstructive pulmonary disease [Oxygen-Dependent] : oxygen-dependent [Stage IV (Very Severe)] : stage IV (very severe) [PFTs] : pulmonary function tests [Medication Changes Per Orders] : Medication changes are as documented in orders [Supplemental Oxygen] : supplemental oxygen [FreeTextEntry1] : Status post COVID-pneumonia with hypoxic respiratory failure responded well to therapy. CCT at baseline   [Decompensated] : decompensated [de-identified] : Oxygen recerticifation

## 2024-02-12 ENCOUNTER — APPOINTMENT (OUTPATIENT)
Dept: PULMONOLOGY | Facility: CLINIC | Age: 79
End: 2024-02-12
Payer: MEDICARE

## 2024-02-12 VITALS
DIASTOLIC BLOOD PRESSURE: 74 MMHG | SYSTOLIC BLOOD PRESSURE: 126 MMHG | OXYGEN SATURATION: 92 % | RESPIRATION RATE: 16 BRPM | HEART RATE: 76 BPM

## 2024-02-12 VITALS — BODY MASS INDEX: 35.7 KG/M2 | WEIGHT: 194 LBS | HEIGHT: 62 IN

## 2024-02-12 VITALS — HEIGHT: 62 IN | BODY MASS INDEX: 35.33 KG/M2 | WEIGHT: 192 LBS

## 2024-02-12 DIAGNOSIS — J44.9 CHRONIC OBSTRUCTIVE PULMONARY DISEASE, UNSPECIFIED: ICD-10-CM

## 2024-02-12 PROCEDURE — 94010 BREATHING CAPACITY TEST: CPT

## 2024-02-12 PROCEDURE — 99214 OFFICE O/P EST MOD 30 MIN: CPT

## 2024-02-12 RX ORDER — AMLODIPINE BESYLATE 5 MG/1
5 TABLET ORAL
Refills: 0 | Status: ACTIVE | COMMUNITY

## 2024-02-12 RX ORDER — FLUTICASONE PROPIONATE 50 UG/1
SPRAY, METERED NASAL
Refills: 0 | Status: ACTIVE | COMMUNITY

## 2024-02-12 RX ORDER — NORMAL SALT TABLETS 1 G/G
TABLET ORAL
Refills: 0 | Status: ACTIVE | COMMUNITY

## 2024-02-12 RX ORDER — ROSUVASTATIN CALCIUM 20 MG/1
20 TABLET, FILM COATED ORAL
Refills: 0 | Status: ACTIVE | COMMUNITY

## 2024-02-12 RX ORDER — GUAIFENESIN 600 MG
600 TABLET, EXTENDED RELEASE ORAL
Refills: 0 | Status: ACTIVE | COMMUNITY

## 2024-02-12 RX ORDER — PYRIDOSTIGMINE BROMIDE 30 MG/1
TABLET ORAL
Refills: 0 | Status: ACTIVE | COMMUNITY

## 2024-02-12 RX ORDER — OXCARBAZEPINE 300 MG/1
300 TABLET, FILM COATED ORAL
Refills: 0 | Status: ACTIVE | COMMUNITY

## 2024-02-12 RX ORDER — LISINOPRIL 30 MG/1
TABLET ORAL
Refills: 0 | Status: ACTIVE | COMMUNITY

## 2024-02-12 NOTE — CONSULT LETTER
[Dear  ___] : Dear  [unfilled], [Consult Letter:] : I had the pleasure of evaluating your patient, [unfilled]. [Please see my note below.] : Please see my note below. [Consult Closing:] : Thank you very much for allowing me to participate in the care of this patient.  If you have any questions, please do not hesitate to contact me. [Sincerely,] : Sincerely, [FreeTextEntry3] : Maurice Rose MD FCCP\par  Pulmonary/Critical Care/Sleep Medicine\par  Department of Internal Medicine\par  \par  [facility] [employer] [facility]\par

## 2024-02-12 NOTE — DISCUSSION/SUMMARY
[COPD] : chronic obstructive pulmonary disease [Oxygen-Dependent] : oxygen-dependent [Stage IV (Very Severe)] : stage IV (very severe) [PFTs] : pulmonary function tests [Medication Changes Per Orders] : Medication changes are as documented in orders [Supplemental Oxygen] : supplemental oxygen [de-identified] : Oxygen recerticifation [Stable] : stable [Improving] : improving [Responding to Treatment] : responding to treatment

## 2024-02-12 NOTE — HISTORY OF PRESENT ILLNESS
[Intermittent] : Intermittent [NC] : Nasal Cannula [24 hrs] : 24 hours/day [Cough] : resolved coughing [Coughing Up Sputum] : resolved coughing up sputum [Wheezing] : resolved wheezing [Regional Soft Tissue Swelling Both Lower Extremities] : denies lower extremity edema [___ Times a Day] : of [unfilled] time(s) a day [None] : None [FreeTextEntry3] : forgets to take [FreeTextEntry1] : 3 [FreeTextEntry4] : [employer] [Difficulty Breathing During Exertion] : improved dyspnea on exertion [Feelings Of Weakness On Exertion] : improved exercise intolerance [Fever] : no fever [Wt Gain ___ Lbs] : no recent weight gain [Adherent] : the patient is adherent with ~his/her~ medication regimen [de-identified] : Bladder cancer, Quintero's esophagus, gastroesophageal reflux, prior, skin cancer, pulmonary nodules, Myasthenia

## 2024-02-12 NOTE — RESULTS/DATA
[TextEntry] : facility: [facility]   Indication: Pulmonary embolism   Dynamic spiral evaluation of the pulmonary arterial tree was performed after intravenous administration of contrast, Omnipaque. Automated exposure control was utilized. Coronal and sagittal reformations were obtained   MIP imaging was performed.   There is no evidence of pulmonary embolism on either side   COPD/bullous emphysema is noted. A patchy inflammatory-type density is noted in the right middle lobe measuring 1.5 cm in greatest dimension. Areas of bronchiectasis are noted at the lung bases, with patchy inflammatory and nodular parenchymal densities as noted on the recent CT scan dated 8/7/2023. These densities appear progressive since the prior CT scan. Interval follow-up evaluation is recommended after appropriate interval therapy.   No evidence of pleural effusion   Heart is normal in size and configuration. Calcification is noted in the aorta. There is dilatation of the proximal descending thoracic aorta to 4.3 cm in greatest dimension. No evidence of hilar lymphadenopathy.   Enlarged middle mediastinal lymph nodes are noted, unchanged. An enlarged subcarinal lymph node measuring 3 cm in greatest dimension is also unchanged. These are more than likely inflammatory.   No adrenal masses are noted. Calcified granuloma is noted in the left hepatic lobe.   Degenerative spondylosis of the thoracic spine was noted.   IMPRESSION:   No evidence of pulmonary embolism   Bullous emphysema   Progressive patchy inflammatory and nodular densities noted since prior CT scan. Close interval follow-up evaluation is recommended after appropriate interval therapy to assess for resolution.   Calcific atherosclerosis   Dilatation of the proximal descending thoracic aorta to 4.3 cm in greatest dimension,   Electronically signed by:  Nestor Rivero MD  9/1/2023 6:02 PM CDT Workstation: [id number]   Signed By:  Nestor Rivero MD on 9/1/2023  7:02 PM

## 2024-04-10 NOTE — ED ADULT NURSE NOTE - NSICDXPASTMEDICALHX_GEN_ALL_CORE_FT
PAST MEDICAL HISTORY:  Benign prostatic hyperplasia     CAD (coronary artery disease)     COPD without exacerbation     Diabetes mellitus     GERD (gastroesophageal reflux disease)     Myasthenia gravis     Seizures     
GERD (gastroesophageal reflux disease)

## 2024-09-25 ENCOUNTER — APPOINTMENT (OUTPATIENT)
Dept: PULMONOLOGY | Facility: CLINIC | Age: 79
End: 2024-09-25
Payer: MEDICARE

## 2024-09-25 VITALS
DIASTOLIC BLOOD PRESSURE: 60 MMHG | BODY MASS INDEX: 34.55 KG/M2 | RESPIRATION RATE: 16 BRPM | HEIGHT: 63 IN | OXYGEN SATURATION: 96 % | SYSTOLIC BLOOD PRESSURE: 108 MMHG | WEIGHT: 195 LBS | HEART RATE: 63 BPM

## 2024-09-25 DIAGNOSIS — J44.9 CHRONIC OBSTRUCTIVE PULMONARY DISEASE, UNSPECIFIED: ICD-10-CM

## 2024-09-25 DIAGNOSIS — J44.1 CHRONIC OBSTRUCTIVE PULMONARY DISEASE WITH (ACUTE) EXACERBATION: ICD-10-CM

## 2024-09-25 PROCEDURE — 99215 OFFICE O/P EST HI 40 MIN: CPT

## 2024-09-25 PROCEDURE — G2211 COMPLEX E/M VISIT ADD ON: CPT

## 2024-09-25 RX ORDER — AMOXICILLIN AND CLAVULANATE POTASSIUM 875; 125 MG/1; MG/1
875-125 TABLET, COATED ORAL
Qty: 14 | Refills: 0 | Status: ACTIVE | COMMUNITY
Start: 2024-09-25 | End: 1900-01-01

## 2024-09-25 RX ORDER — ROSUVASTATIN CALCIUM 10 MG/1
10 TABLET, FILM COATED ORAL
Refills: 0 | Status: ACTIVE | COMMUNITY

## 2024-09-25 NOTE — HISTORY OF PRESENT ILLNESS
[Intermittent] : Intermittent [NC] : Nasal Cannula [24 hrs] : 24 hours/day [Wheezing] : resolved wheezing [Regional Soft Tissue Swelling Both Lower Extremities] : denies lower extremity edema [___ Times a Day] : of [unfilled] time(s) a day [None] : None [Adherent] : the patient is adherent with ~his/her~ medication regimen [Doing Poorly] : doing poorly [Difficulty Breathing During Exertion] : worsened dyspnea on exertion [Feelings Of Weakness On Exertion] : worsened exercise intolerance [Cough] : worsened coughing [Coughing Up Sputum] : worsened coughing up sputum [FreeTextEntry1] : 3 [FreeTextEntry4] : [employer] [Fever] : no fever [Wt Gain ___ Lbs] : no recent weight gain [de-identified] : Bladder cancer, Quintero's esophagus, gastroesophageal reflux, prior, skin cancer, pulmonary nodules, Myasthenia  [de-identified] : Worsening symptoms of increased cough wheeze and yellow sputum production over the course of the last several weeks.  No associated chest pain

## 2024-09-25 NOTE — DISCUSSION/SUMMARY
[COPD] : chronic obstructive pulmonary disease [Oxygen-Dependent] : oxygen-dependent [Stage IV (Very Severe)] : stage IV (very severe) [Medication Changes Per Orders] : Medication changes are as documented in orders [Supplemental Oxygen] : supplemental oxygen [Decompensated] : decompensated [de-identified] : If symptoms worsen patient advised to seek the attention of the emergency room

## 2024-09-25 NOTE — CONSULT LETTER
[Dear  ___] : Dear  [unfilled], [Consult Letter:] : I had the pleasure of evaluating your patient, [unfilled]. [Please see my note below.] : Please see my note below. [Consult Closing:] : Thank you very much for allowing me to participate in the care of this patient.  If you have any questions, please do not hesitate to contact me. [Sincerely,] : Sincerely, [FreeTextEntry3] : Maurice Rose MD FCCP\par  Pulmonary/Critical Care/Sleep Medicine\par  Department of Internal Medicine\par  \par  [facility] [employer] [facility]\par

## 2024-09-25 NOTE — RESULTS/DATA
[TextEntry] : facility: [facility]   Indication: Pulmonary embolism   Dynamic spiral evaluation of the pulmonary arterial tree was performed after intravenous administration of contrast, Omnipaque. Automated exposure control was utilized. Coronal and sagittal reformations were obtained   MIP imaging was performed.   There is no evidence of pulmonary embolism on either side   COPD/bullous emphysema is noted. A patchy inflammatory-type density is noted in the right middle lobe measuring 1.5 cm in greatest dimension. Areas of bronchiectasis are noted at the lung bases, with patchy inflammatory and nodular parenchymal densities as noted on the recent CT scan dated 8/7/2023. These densities appear progressive since the prior CT scan. Interval follow-up evaluation is recommended after appropriate interval therapy.   No evidence of pleural effusion   Heart is normal in size and configuration. Calcification is noted in the aorta. There is dilatation of the proximal descending thoracic aorta to 4.3 cm in greatest dimension. No evidence of hilar lymphadenopathy.   Enlarged middle mediastinal lymph nodes are noted, unchanged. An enlarged subcarinal lymph node measuring 3 cm in greatest dimension is also unchanged. These are more than likely inflammatory.   No adrenal masses are noted. Calcified granuloma is noted in the left hepatic lobe.   Degenerative spondylosis of the thoracic spine was noted.   IMPRESSION:   No evidence of pulmonary embolism   Bullous emphysema   Progressive patchy inflammatory and nodular densities noted since prior CT scan. Close interval follow-up evaluation is recommended after appropriate interval therapy to assess for resolution.   Calcific atherosclerosis   Dilatation of the proximal descending thoracic aorta to 4.3 cm in greatest dimension,   Electronically signed by:  Nestor Rivero MD  9/1/2023 6:02 PM CDT Workstation: [id number]   Signed By:  Nestor Rivero MD on 9/1/2023  7:02 PM

## 2024-10-17 ENCOUNTER — INPATIENT (INPATIENT)
Facility: HOSPITAL | Age: 79
LOS: 3 days | Discharge: HOME | DRG: 195 | End: 2024-10-21
Attending: STUDENT IN AN ORGANIZED HEALTH CARE EDUCATION/TRAINING PROGRAM | Admitting: STUDENT IN AN ORGANIZED HEALTH CARE EDUCATION/TRAINING PROGRAM
Payer: MEDICARE

## 2024-10-17 VITALS
RESPIRATION RATE: 22 BRPM | HEIGHT: 64 IN | OXYGEN SATURATION: 98 % | WEIGHT: 195.99 LBS | DIASTOLIC BLOOD PRESSURE: 64 MMHG | HEART RATE: 109 BPM | SYSTOLIC BLOOD PRESSURE: 132 MMHG | TEMPERATURE: 98 F

## 2024-10-17 DIAGNOSIS — J18.9 PNEUMONIA, UNSPECIFIED ORGANISM: ICD-10-CM

## 2024-10-17 LAB
ALBUMIN SERPL ELPH-MCNC: 3.5 G/DL — SIGNIFICANT CHANGE UP (ref 3.3–5.2)
ALP SERPL-CCNC: 98 U/L — SIGNIFICANT CHANGE UP (ref 40–120)
ALT FLD-CCNC: 58 U/L — HIGH
ANION GAP SERPL CALC-SCNC: 11 MMOL/L — SIGNIFICANT CHANGE UP (ref 5–17)
APTT BLD: 29 SEC — SIGNIFICANT CHANGE UP (ref 24.5–35.6)
AST SERPL-CCNC: 74 U/L — HIGH
BASOPHILS # BLD AUTO: 0.03 K/UL — SIGNIFICANT CHANGE UP (ref 0–0.2)
BASOPHILS NFR BLD AUTO: 0.1 % — SIGNIFICANT CHANGE UP (ref 0–2)
BILIRUB SERPL-MCNC: 0.8 MG/DL — SIGNIFICANT CHANGE UP (ref 0.4–2)
BUN SERPL-MCNC: 14.8 MG/DL — SIGNIFICANT CHANGE UP (ref 8–20)
CALCIUM SERPL-MCNC: 8.7 MG/DL — SIGNIFICANT CHANGE UP (ref 8.4–10.5)
CHLORIDE SERPL-SCNC: 98 MMOL/L — SIGNIFICANT CHANGE UP (ref 96–108)
CO2 SERPL-SCNC: 26 MMOL/L — SIGNIFICANT CHANGE UP (ref 22–29)
CREAT SERPL-MCNC: 0.81 MG/DL — SIGNIFICANT CHANGE UP (ref 0.5–1.3)
EGFR: 89 ML/MIN/1.73M2 — SIGNIFICANT CHANGE UP
EOSINOPHIL # BLD AUTO: 0.01 K/UL — SIGNIFICANT CHANGE UP (ref 0–0.5)
EOSINOPHIL NFR BLD AUTO: 0 % — SIGNIFICANT CHANGE UP (ref 0–6)
GAS PNL BLDV: SIGNIFICANT CHANGE UP
GLUCOSE SERPL-MCNC: 134 MG/DL — HIGH (ref 70–99)
HCT VFR BLD CALC: 37.2 % — LOW (ref 39–50)
HGB BLD-MCNC: 12.5 G/DL — LOW (ref 13–17)
IMM GRANULOCYTES NFR BLD AUTO: 1 % — HIGH (ref 0–0.9)
INR BLD: 1.13 RATIO — SIGNIFICANT CHANGE UP (ref 0.85–1.16)
LYMPHOCYTES # BLD AUTO: 1.42 K/UL — SIGNIFICANT CHANGE UP (ref 1–3.3)
LYMPHOCYTES # BLD AUTO: 7.1 % — LOW (ref 13–44)
MAGNESIUM SERPL-MCNC: 1.9 MG/DL — SIGNIFICANT CHANGE UP (ref 1.6–2.6)
MCHC RBC-ENTMCNC: 31.8 PG — SIGNIFICANT CHANGE UP (ref 27–34)
MCHC RBC-ENTMCNC: 33.6 GM/DL — SIGNIFICANT CHANGE UP (ref 32–36)
MCV RBC AUTO: 94.7 FL — SIGNIFICANT CHANGE UP (ref 80–100)
MONOCYTES # BLD AUTO: 1.22 K/UL — HIGH (ref 0–0.9)
MONOCYTES NFR BLD AUTO: 6.1 % — SIGNIFICANT CHANGE UP (ref 2–14)
NEUTROPHILS # BLD AUTO: 17.19 K/UL — HIGH (ref 1.8–7.4)
NEUTROPHILS NFR BLD AUTO: 85.7 % — HIGH (ref 43–77)
NT-PROBNP SERPL-SCNC: 305 PG/ML — HIGH (ref 0–300)
PLATELET # BLD AUTO: 152 K/UL — SIGNIFICANT CHANGE UP (ref 150–400)
POTASSIUM SERPL-MCNC: 4.6 MMOL/L — SIGNIFICANT CHANGE UP (ref 3.5–5.3)
POTASSIUM SERPL-SCNC: 4.6 MMOL/L — SIGNIFICANT CHANGE UP (ref 3.5–5.3)
PROT SERPL-MCNC: 6.9 G/DL — SIGNIFICANT CHANGE UP (ref 6.6–8.7)
PROTHROM AB SERPL-ACNC: 13.1 SEC — SIGNIFICANT CHANGE UP (ref 9.9–13.4)
RAPID RVP RESULT: DETECTED
RBC # BLD: 3.93 M/UL — LOW (ref 4.2–5.8)
RBC # FLD: 15.8 % — HIGH (ref 10.3–14.5)
RV+EV RNA SPEC QL NAA+PROBE: DETECTED
SARS-COV-2 RNA SPEC QL NAA+PROBE: SIGNIFICANT CHANGE UP
SODIUM SERPL-SCNC: 135 MMOL/L — SIGNIFICANT CHANGE UP (ref 135–145)
TROPONIN T, HIGH SENSITIVITY RESULT: 29 NG/L — SIGNIFICANT CHANGE UP (ref 0–51)
TROPONIN T, HIGH SENSITIVITY RESULT: 36 NG/L — SIGNIFICANT CHANGE UP (ref 0–51)
WBC # BLD: 20.07 K/UL — HIGH (ref 3.8–10.5)
WBC # FLD AUTO: 20.07 K/UL — HIGH (ref 3.8–10.5)

## 2024-10-17 PROCEDURE — 99285 EMERGENCY DEPT VISIT HI MDM: CPT

## 2024-10-17 PROCEDURE — 93010 ELECTROCARDIOGRAM REPORT: CPT

## 2024-10-17 PROCEDURE — 99223 1ST HOSP IP/OBS HIGH 75: CPT

## 2024-10-17 PROCEDURE — 71045 X-RAY EXAM CHEST 1 VIEW: CPT | Mod: 26

## 2024-10-17 RX ORDER — MAGNESIUM, ALUMINUM HYDROXIDE 200-200 MG
30 TABLET,CHEWABLE ORAL EVERY 4 HOURS
Refills: 0 | Status: DISCONTINUED | OUTPATIENT
Start: 2024-10-17 | End: 2024-10-21

## 2024-10-17 RX ORDER — ACETAMINOPHEN 500 MG
1000 TABLET ORAL ONCE
Refills: 0 | Status: COMPLETED | OUTPATIENT
Start: 2024-10-17 | End: 2024-10-17

## 2024-10-17 RX ORDER — FLUTICASONE PROPIONATE AND SALMETEROL XINAFOATE 230; 21 UG/1; UG/1
1 AEROSOL, METERED RESPIRATORY (INHALATION)
Refills: 0 | Status: DISCONTINUED | OUTPATIENT
Start: 2024-10-17 | End: 2024-10-21

## 2024-10-17 RX ORDER — CEFTRIAXONE SODIUM 10 G
1000 VIAL (EA) INJECTION ONCE
Refills: 0 | Status: COMPLETED | OUTPATIENT
Start: 2024-10-17 | End: 2024-10-17

## 2024-10-17 RX ORDER — METHYLPREDNISOLONE ACETATE 80 MG/ML
40 INJECTION, SUSPENSION INTRALESIONAL; INTRAMUSCULAR; INTRASYNOVIAL; SOFT TISSUE EVERY 8 HOURS
Refills: 0 | Status: DISCONTINUED | OUTPATIENT
Start: 2024-10-17 | End: 2024-10-19

## 2024-10-17 RX ORDER — ASPIRIN/MAG CARB/ALUMINUM AMIN 325 MG
81 TABLET ORAL DAILY
Refills: 0 | Status: DISCONTINUED | OUTPATIENT
Start: 2024-10-17 | End: 2024-10-21

## 2024-10-17 RX ORDER — CEFTRIAXONE SODIUM 10 G
1000 VIAL (EA) INJECTION ONCE
Refills: 0 | Status: DISCONTINUED | OUTPATIENT
Start: 2024-10-17 | End: 2024-10-17

## 2024-10-17 RX ORDER — UMECLIDINIUM 62.5 UG/1
1 AEROSOL, POWDER ORAL
Refills: 0 | DISCHARGE

## 2024-10-17 RX ORDER — ROSUVASTATIN CALCIUM 10 MG
1 TABLET ORAL
Refills: 0 | DISCHARGE

## 2024-10-17 RX ORDER — ACETAMINOPHEN 500 MG
650 TABLET ORAL EVERY 6 HOURS
Refills: 0 | Status: DISCONTINUED | OUTPATIENT
Start: 2024-10-17 | End: 2024-10-21

## 2024-10-17 RX ORDER — INFLUENZ VIR VAC TV P-SURF2003 15MCG/.5ML
0.5 SYRINGE (ML) INTRAMUSCULAR ONCE
Refills: 0 | Status: DISCONTINUED | OUTPATIENT
Start: 2024-10-17 | End: 2024-10-21

## 2024-10-17 RX ORDER — ONDANSETRON HYDROCHLORIDE 2 MG/ML
4 INJECTION, SOLUTION INTRAMUSCULAR; INTRAVENOUS EVERY 8 HOURS
Refills: 0 | Status: DISCONTINUED | OUTPATIENT
Start: 2024-10-17 | End: 2024-10-21

## 2024-10-17 RX ORDER — METHYLPREDNISOLONE ACETATE 80 MG/ML
125 INJECTION, SUSPENSION INTRALESIONAL; INTRAMUSCULAR; INTRASYNOVIAL; SOFT TISSUE ONCE
Refills: 0 | Status: COMPLETED | OUTPATIENT
Start: 2024-10-17 | End: 2024-10-17

## 2024-10-17 RX ORDER — MELATONIN 5 MG
3 TABLET ORAL AT BEDTIME
Refills: 0 | Status: DISCONTINUED | OUTPATIENT
Start: 2024-10-17 | End: 2024-10-21

## 2024-10-17 RX ORDER — AZITHROMYCIN DIHYDRATE 200 MG/5ML
500 POWDER, FOR SUSPENSION ORAL ONCE
Refills: 0 | Status: COMPLETED | OUTPATIENT
Start: 2024-10-17 | End: 2024-10-17

## 2024-10-17 RX ORDER — ENOXAPARIN SODIUM 40MG/0.4ML
40 SYRINGE (ML) SUBCUTANEOUS EVERY 24 HOURS
Refills: 0 | Status: DISCONTINUED | OUTPATIENT
Start: 2024-10-17 | End: 2024-10-21

## 2024-10-17 RX ORDER — OXCARBAZEPINE 60 MG/ML
300 SUSPENSION ORAL
Refills: 0 | Status: DISCONTINUED | OUTPATIENT
Start: 2024-10-17 | End: 2024-10-21

## 2024-10-17 RX ORDER — IPRATROPIUM BROMIDE AND ALBUTEROL SULFATE .5; 2.5 MG/3ML; MG/3ML
3 SOLUTION RESPIRATORY (INHALATION)
Refills: 0 | Status: COMPLETED | OUTPATIENT
Start: 2024-10-17 | End: 2024-10-17

## 2024-10-17 RX ORDER — OXCARBAZEPINE 60 MG/ML
300 SUSPENSION ORAL
Refills: 0 | Status: DISCONTINUED | OUTPATIENT
Start: 2024-10-17 | End: 2024-10-17

## 2024-10-17 RX ORDER — ROSUVASTATIN CALCIUM 10 MG
10 TABLET ORAL AT BEDTIME
Refills: 0 | Status: DISCONTINUED | OUTPATIENT
Start: 2024-10-17 | End: 2024-10-21

## 2024-10-17 RX ORDER — SODIUM CHLORIDE 9 MG/ML
1850 INJECTION, SOLUTION INTRAMUSCULAR; INTRAVENOUS; SUBCUTANEOUS ONCE
Refills: 0 | Status: COMPLETED | OUTPATIENT
Start: 2024-10-17 | End: 2024-10-17

## 2024-10-17 RX ORDER — IPRATROPIUM BROMIDE AND ALBUTEROL SULFATE .5; 2.5 MG/3ML; MG/3ML
3 SOLUTION RESPIRATORY (INHALATION) EVERY 6 HOURS
Refills: 0 | Status: DISCONTINUED | OUTPATIENT
Start: 2024-10-17 | End: 2024-10-21

## 2024-10-17 RX ORDER — LISINOPRIL 40 MG
2.5 TABLET ORAL DAILY
Refills: 0 | Status: DISCONTINUED | OUTPATIENT
Start: 2024-10-17 | End: 2024-10-21

## 2024-10-17 RX ORDER — CEFTRIAXONE SODIUM 10 G
1000 VIAL (EA) INJECTION EVERY 24 HOURS
Refills: 0 | Status: DISCONTINUED | OUTPATIENT
Start: 2024-10-18 | End: 2024-10-21

## 2024-10-17 RX ORDER — AZITHROMYCIN DIHYDRATE 200 MG/5ML
500 POWDER, FOR SUSPENSION ORAL EVERY 24 HOURS
Refills: 0 | Status: DISCONTINUED | OUTPATIENT
Start: 2024-10-18 | End: 2024-10-21

## 2024-10-17 RX ADMIN — FLUTICASONE PROPIONATE AND SALMETEROL XINAFOATE 1 DOSE(S): 230; 21 AEROSOL, METERED RESPIRATORY (INHALATION) at 20:47

## 2024-10-17 RX ADMIN — SODIUM CHLORIDE 1850 MILLILITER(S): 9 INJECTION, SOLUTION INTRAMUSCULAR; INTRAVENOUS; SUBCUTANEOUS at 13:09

## 2024-10-17 RX ADMIN — METHYLPREDNISOLONE ACETATE 40 MILLIGRAM(S): 80 INJECTION, SUSPENSION INTRALESIONAL; INTRAMUSCULAR; INTRASYNOVIAL; SOFT TISSUE at 21:49

## 2024-10-17 RX ADMIN — IPRATROPIUM BROMIDE AND ALBUTEROL SULFATE 3 MILLILITER(S): .5; 2.5 SOLUTION RESPIRATORY (INHALATION) at 20:46

## 2024-10-17 RX ADMIN — Medication 400 MILLIGRAM(S): at 12:08

## 2024-10-17 RX ADMIN — Medication 10 MILLIGRAM(S): at 21:50

## 2024-10-17 RX ADMIN — AZITHROMYCIN DIHYDRATE 500 MILLIGRAM(S): 200 POWDER, FOR SUSPENSION ORAL at 14:00

## 2024-10-17 RX ADMIN — IPRATROPIUM BROMIDE AND ALBUTEROL SULFATE 3 MILLILITER(S): .5; 2.5 SOLUTION RESPIRATORY (INHALATION) at 12:16

## 2024-10-17 RX ADMIN — Medication 1000 MILLIGRAM(S): at 14:00

## 2024-10-17 RX ADMIN — IPRATROPIUM BROMIDE AND ALBUTEROL SULFATE 3 MILLILITER(S): .5; 2.5 SOLUTION RESPIRATORY (INHALATION) at 12:07

## 2024-10-17 RX ADMIN — IPRATROPIUM BROMIDE AND ALBUTEROL SULFATE 3 MILLILITER(S): .5; 2.5 SOLUTION RESPIRATORY (INHALATION) at 12:15

## 2024-10-17 RX ADMIN — AZITHROMYCIN DIHYDRATE 255 MILLIGRAM(S): 200 POWDER, FOR SUSPENSION ORAL at 12:09

## 2024-10-17 RX ADMIN — Medication 1000 MILLIGRAM(S): at 12:08

## 2024-10-17 RX ADMIN — OXCARBAZEPINE 300 MILLIGRAM(S): 60 SUSPENSION ORAL at 21:49

## 2024-10-17 RX ADMIN — METHYLPREDNISOLONE ACETATE 125 MILLIGRAM(S): 80 INJECTION, SUSPENSION INTRALESIONAL; INTRAMUSCULAR; INTRASYNOVIAL; SOFT TISSUE at 13:20

## 2024-10-17 RX ADMIN — SODIUM CHLORIDE 1850 MILLILITER(S): 9 INJECTION, SOLUTION INTRAMUSCULAR; INTRAVENOUS; SUBCUTANEOUS at 12:08

## 2024-10-17 NOTE — ED ADULT TRIAGE NOTE - CHIEF COMPLAINT QUOTE
pt c/o asthma exacerbation, worsening SOB over past 2 days. per EMS, spo2 89% on home o2, unsure how many liters NC. pt given 1 duo neb and placed on NRB by ems.

## 2024-10-17 NOTE — H&P ADULT - NSHPPHYSICALEXAM_GEN_ALL_CORE
T(C): 36.8 (10-17-24 @ 14:00), Max: 39.8 (10-17-24 @ 12:00)  HR: 91 (10-17-24 @ 14:30) (91 - 109)  BP: 111/58 (10-17-24 @ 14:30) (108/53 - 132/64)  RR: 20 (10-17-24 @ 14:30) (20 - 26)  SpO2: 94% (10-17-24 @ 14:30) (94% - 100%)    CONSTITUTIONAL: no apparent distress  EYES: PERRLA, EOMI  ENMT: Oral mucosa with moist membranes  RESP: No respiratory distress, diffuse wheezing heard throughout  CV: RRR, +S1S2, no peripheral edema  GI: Soft, NT, ND  PSYCH: A+O x 3, mood and affect appropriate  NEURO: Cooperative, upper and lower motor function grossly intact bilaterally, sensation grossly intact throughout

## 2024-10-17 NOTE — ED PROVIDER NOTE - PHYSICAL EXAMINATION
General: Awake, alert, lying in bed in moderate respiratory distress  HEENT: Normocephalic, atraumatic. No scleral icterus or conjunctival injection. EOMI.  Neck:. Soft and supple.  Cardiac: RRR, S1/S2 present  Resp: Lungs with diffuse wheezing and rhonchi. On NL 6L. Symmetric chest expansion with inspiration. No accessory muscle use  Abd: Soft, non-tender, non-distended. No guarding, rebound, or rigidity.  Skin: No rashes, abrasions, or lacerations.  Extremities: No LE edema.  Neuro: AO x 4. Moves all extremities symmetrically. Motor strength and sensation grossly intact.  Psych: Appropriate mood and affect

## 2024-10-17 NOTE — ED PROVIDER NOTE - OBJECTIVE STATEMENT
80 y/o M with PMH of BPH, GERD. DM, COPD, CAD, seizures, brain hemangioma s/p laser treatment in 2008, and myasthenia gravis presents to the ED c/o SOB.  Patient was BIBEMS for difficulty breathing.  Patient was initially on nonrebreather and was subsequently titrated down to nasal cannula in the ED. Patient states he has a history of difficulty with breathing and has a history of COPD on 2 L NC at home.  Patient states he was also recently seen by his pulmonologist for similar symptoms.  Patient was diagnosed with pneumonia at that time and started on steroids but is unsure if he was prescribed antibiotics.  Patient is not well versed in his medical history and states his wife has more information but is unavailable at this time. Patient denies chest pain, fever, chills, nausea, vomiting, diarrhea, constipation, headache, weakness, dizziness, dysuria, hematuria.

## 2024-10-17 NOTE — ED PROVIDER NOTE - NSICDXPASTMEDICALHX_GEN_ALL_CORE_FT
PAST MEDICAL HISTORY:  Benign prostatic hyperplasia     CAD (coronary artery disease)     COPD without exacerbation     Diabetes mellitus     DM (diabetes mellitus)     GERD (gastroesophageal reflux disease)     H/O myasthenia gravis     HTN (hypertension)     Myasthenia gravis     Seizures

## 2024-10-17 NOTE — H&P ADULT - HISTORY OF PRESENT ILLNESS
80yo male with HTN, T2DM,  CAD, BPH, COPD (on home O2) brain hemangioma s/p laser treatment in 2008, myasthenia gravis who presents to the ED complaining of SOB. Pt is unclear for how long symptoms have been occurring. Review of his medications found pt received script for Augmentin and prednisone filled on 9/25. Chart review revealed that pt has history of difficulty breathing 2/2 COPD and has PRN home O2. Attempted to call pt's family at numbers listed in the chart without answer, Voicemail left. Patient denies recent fever, chills, sore throat, chest pain, abdominal pain, n/v/d, LE pain/swelling.

## 2024-10-17 NOTE — ED PROVIDER NOTE - NS ED ROS FT
General: Denies fever, chills  HEENT: Denies sore throat  Neck: Denies neck pain  Resp: + SOB  Cardiovascular: Denies CP, palpitations, LE edema  GI: Denies nausea, vomiting, abdominal pain, diarrhea, constipation, blood in stool  : Denies dysuria, hematuria  MSK: Denies back pain  Neuro: Denies headache, dizziness, numbness, weakness  Skin: Denies rashes.

## 2024-10-17 NOTE — PHARMACOTHERAPY INTERVENTION NOTE - COMMENTS
Referenced outpatient medical fill records and spoke with outpatient pharmacy to obtain medication list. Of note, patient was recently prescribed amoxicillin and Clavulanate 875-125mg 1 tablet twice a day for 5 days, was filled on 9/25. Patient was also prescribed prednisone 10mg 6 TABLETS EVERY DAY ON DAY ONE AND 2 AND TAPER BY ONE TABLET EVERY THIRD DAY for 12 days on 9/25.

## 2024-10-17 NOTE — ED ADULT NURSE NOTE - NSFALLRISKINTERV_ED_ALL_ED

## 2024-10-17 NOTE — ED ADULT NURSE NOTE - OBJECTIVE STATEMENT
Pt in no apparent distress at this time. Airway patent, breathing spontaneous and nonlabored. Pt A&Ox3 resting in stretcher. Pt c/o       , SOB, recently prescribed steroids for same complaint, unknown if given abx during that visit. on 2lnc at home for COPD but req higher oxygen by ems. now on 4lnc moved to CC placed on monitor . MD at bedside

## 2024-10-17 NOTE — ED ADULT TRIAGE NOTE - SOURCE OF INFORMATION
L Inj/Asp: L knee on 12/7/2023 2:20 PM  Indications: pain and joint swelling  Details: 22 G needle, anterolateral approach  Medications: 4 mL lidocaine 10 mg/mL (1 %); 1 mL triamcinolone acetonide 40 mg/mL    Discussion:  I discussed the conservative treatment options for knee osteoarthritis including but not limited to physical therapy, oral NSAIDS, activity and lifestyle modification, and corticosteroid injections. Pt has elected to undergo a cortisone injection today. I have explained the risk and benefits of an injection including the possibility of joint infection, bleeding, damage to cartilage, allergic reaction. Patient verbalized understanding and gave verbal consent wishes to proceed with a intra-articular cortisone injection for their knee.    Procedure:  After discussing the risk and benefits of the procedure, we proceeded with an intra-articular left knee injection.    With the patient's informed verbal consent, the left knee was prepped in standard sterile fashion with Chlorhexidine. The skin was then anesthetized with ethyl chloride spray and cleaned again with Chlorhexidine. The knee was then apirated/injected with a prefilled 20-gauge syringe of 40 mg Kenalog + 4 ml Lidocaine using the lateral approach without complications.  The patient tolerated this well and felt immediate initial relief of symptoms. A bandaid was applied and the patient ambulated out of the clinic on ther own accord without difficulty. Patient was instructed to avoid physical activity for 24-48 hours to prevent the knees from swelling and may ice the knees as tolerated. Patient should contact the office if any signs of of infection appear: redness, fever, chills, drainage, swelling or warmth to the knees.  Pt understands that the injections can be repeated no sooner than 3 months.  Procedure, treatment alternatives, risks and benefits explained, specific risks discussed. Consent was given by the patient. Immediately prior to  procedure a time out was called to verify the correct patient, procedure, equipment, support staff and site/side marked as required. Patient was prepped and draped in the usual sterile fashion.           Patient/EMS

## 2024-10-17 NOTE — ED PROVIDER NOTE - PROGRESS NOTE DETAILS
MD Jose: Updated medication list: Pyridostigmine 60mg QID, Oxcarbazepine 400 mg TID, Lisiniprol 2.5 mg, Rosuvastatin 10mg, albuterol nebulizer solution,

## 2024-10-17 NOTE — ED PROVIDER NOTE - ATTENDING CONTRIBUTION TO CARE
79 YOM PMH of BPH, GERD. DM, COPD, CAD, seizures, and myasthenia gravis presents to the ED c/o SOB. Patient reports on home 1-2L NC as needed for COPD. Reports was being treated outpt with antibiotics/steroids for pneumonia but unclear timeline. At first said about 3 weeks ago and then said last week. Was satting low at home, EMS placed him on NRB, titrated down to 5L here.   AP - sepsis vitals, concern for pna. will start on empiric abx. lung with bilateral crackles

## 2024-10-17 NOTE — ED PROVIDER NOTE - CLINICAL SUMMARY MEDICAL DECISION MAKING FREE TEXT BOX
78 y/o M with PMH of BPH, GERD. DM, COPD, CAD, seizures, brain hemangioma s/p laser treatment in 2008, and myasthenia gravis presents to the ED c/o SOB.  Patient was BIBEMS for difficulty breathing.  Patient was initially on nonrebreather and was subsequently titrated down to nasal cannula in the ED. Patient states he has a history of difficulty with breathing and has a history of COPD on 2 L NC at home.  Patient states he was also recently seen by his pulmonologist for similar symptoms.  Patient was diagnosed with pneumonia at that time and started on steroids but is unsure if he was prescribed antibiotics. Patient's rectal temp is 103FF on physical exam patient has diffuse wheezing and rhonchi.  Patient pending labs, CXR.  Patient received ceftriaxone and azithromycin.  Patient is currently on 6 L NC with O2 saturation at 96%.  Patient will receive DuoNeb x 3.  Will reassess. Home 78 y/o M with PMH of BPH, GERD. DM, COPD, CAD, seizures, brain hemangioma s/p laser treatment in 2008, and myasthenia gravis presents to the ED c/o SOB.  Patient was BIBEMS for difficulty breathing.  Patient was initially on nonrebreather and was subsequently titrated down to nasal cannula in the ED. Patient states he has a history of difficulty with breathing and has a history of COPD on 2 L NC at home.  Patient states he was also recently seen by his pulmonologist 2 weeks ago for similar symptoms. Patient was diagnosed with pneumonia at that time and started on steroids but is unsure if he was prescribed antibiotics. Patient's rectal temp is 103F.  Patient is currently on 6 L NC with O2 saturation at 96%. On physical exam patient has diffuse wheezing and rhonchi.  Patient pending labs, CXR.  Patient received ceftriaxone and azithromycin.  Patient will receive DuoNeb x 3.  Will reassess.

## 2024-10-17 NOTE — H&P ADULT - TIME BILLING
chart review, med rec, patient evaluation, documentation, coordination of care and discussion with nurses.

## 2024-10-17 NOTE — H&P ADULT - NSHPLABSRESULTS_GEN_ALL_CORE
12.5   20.07 )-----------( 152      ( 17 Oct 2024 12:00 )             37.2     10-17    135  |  98  |  14.8  ----------------------------<  134[H]  4.6   |  26.0  |  0.81    Ca    8.7      17 Oct 2024 12:00  Mg     1.9     10-17    TPro  6.9  /  Alb  3.5  /  TBili  0.8  /  DBili  x   /  AST  74[H]  /  ALT  58[H]  /  AlkPhos  98  10-17

## 2024-10-17 NOTE — PATIENT PROFILE ADULT - FALL HARM RISK - HARM RISK INTERVENTIONS

## 2024-10-17 NOTE — H&P ADULT - ASSESSMENT
1yo male with HTN, T2DM,  CAD, BPH, COPD, brain hemangioma s/p laser treatment in 2008, ?seizures, myasthenia gravis who presents to the ED complaining of SOB. In the ED found to be febrile to 103.6, tachycardic to 109, normotensive and on 4-6L NC. CXR done without clear consolidation or infiltrates. Blood gas w/o hypercapnia. WBC 20.07. Given abx, duoneb and steroids and admitted to medicine for sepsis and COPD exacerbation.    Acute on chronic hypoxic respiratory failure  COPD exacerbation  Sepsis criteria met  - admit to tele and   - c/w duonebs q6hr  - solumedrol 40mg q8hr  - symbicort   - c/w empiric abx  - check procal andRVP  - wean supplemental O2 to baseline as tolerated    Elevated liver enzymes of unclear etiology  - no abd pain, n/v  - trend LFTs     CAD  HTN  - unclear why pt on aspirin, will start  - no on statin, will hold off for now  - check lipid panel   1yo male with HTN, T2DM,  CAD, BPH, COPD, brain hemangioma s/p laser treatment in 2008, ?seizures, myasthenia gravis who presents to the ED complaining of SOB. In the ED found to be febrile to 103.6, tachycardic to 109, normotensive and on 4-6L NC. CXR done without clear consolidation or infiltrates. Blood gas w/o hypercapnia. WBC 20.07. Given abx, duoneb and steroids and admitted to medicine for sepsis and COPD exacerbation.    Acute on chronic hypoxic respiratory failure  COPD exacerbation  Sepsis criteria met  - admit to tele and   - c/w duonebs q6hr  - solumedrol 40mg q8hr  - symbicort   - c/w empiric abx  - check procal andRVP  - wean supplemental O2 to baseline as tolerated    Elevated liver enzymes of unclear etiology  - no abd pain, n/v  - trend LFTs   - consider holding rosuvastatin if trending up    CAD  HTN  - unclear why pt on aspirin, will start  - c/w rosuvastatin, consider holding   - check lipid panel   78yo male with HTN, T2DM,  CAD, BPH, COPD, brain hemangioma s/p laser treatment in 2008, seizures, myasthenia gravis who presents to the ED complaining of SOB. In the ED found to be febrile to 103.6, tachycardic to 109, normotensive and on 4-6L NC. CXR done without clear consolidation or infiltrates. Blood gas w/o hypercapnia. WBC 20.07. Given abx, duoneb and steroids and admitted to medicine for sepsis and COPD exacerbation.    Acute on chronic hypoxic respiratory failure  COPD exacerbation  Sepsis criteria met  - admit to tele and   - c/w duonebs q6hr  - solumedrol 40mg q8hr  - symbicort   - c/w empiric abx  - check procal andRVP  - wean supplemental O2 to baseline as tolerated    Elevated liver enzymes of unclear etiology  - no abd pain, n/v  - trend LFTs   - consider holding rosuvastatin if trending up    CAD  HTN  - unclear why pt on aspirin, will start  - c/w rosuvastatin, consider holding if LFTs trend up  - lisinopril  - check lipid panel    Seizure disorder  - resume oxcarbazepine 300mg TID    DVT ppx: lovenox

## 2024-10-18 LAB
ALBUMIN SERPL ELPH-MCNC: 3.3 G/DL — SIGNIFICANT CHANGE UP (ref 3.3–5.2)
ALP SERPL-CCNC: 96 U/L — SIGNIFICANT CHANGE UP (ref 40–120)
ALT FLD-CCNC: 55 U/L — HIGH
ANION GAP SERPL CALC-SCNC: 13 MMOL/L — SIGNIFICANT CHANGE UP (ref 5–17)
AST SERPL-CCNC: 58 U/L — HIGH
BILIRUB SERPL-MCNC: 0.4 MG/DL — SIGNIFICANT CHANGE UP (ref 0.4–2)
BUN SERPL-MCNC: 17.8 MG/DL — SIGNIFICANT CHANGE UP (ref 8–20)
CALCIUM SERPL-MCNC: 8.7 MG/DL — SIGNIFICANT CHANGE UP (ref 8.4–10.5)
CHLORIDE SERPL-SCNC: 98 MMOL/L — SIGNIFICANT CHANGE UP (ref 96–108)
CO2 SERPL-SCNC: 23 MMOL/L — SIGNIFICANT CHANGE UP (ref 22–29)
CREAT SERPL-MCNC: 0.58 MG/DL — SIGNIFICANT CHANGE UP (ref 0.5–1.3)
EGFR: 98 ML/MIN/1.73M2 — SIGNIFICANT CHANGE UP
GLUCOSE SERPL-MCNC: 222 MG/DL — HIGH (ref 70–99)
HCT VFR BLD CALC: 36.9 % — LOW (ref 39–50)
HGB BLD-MCNC: 12.3 G/DL — LOW (ref 13–17)
MCHC RBC-ENTMCNC: 31.1 PG — SIGNIFICANT CHANGE UP (ref 27–34)
MCHC RBC-ENTMCNC: 33.3 GM/DL — SIGNIFICANT CHANGE UP (ref 32–36)
MCV RBC AUTO: 93.2 FL — SIGNIFICANT CHANGE UP (ref 80–100)
PLATELET # BLD AUTO: 126 K/UL — LOW (ref 150–400)
POTASSIUM SERPL-MCNC: 3.9 MMOL/L — SIGNIFICANT CHANGE UP (ref 3.5–5.3)
POTASSIUM SERPL-SCNC: 3.9 MMOL/L — SIGNIFICANT CHANGE UP (ref 3.5–5.3)
PROT SERPL-MCNC: 6.6 G/DL — SIGNIFICANT CHANGE UP (ref 6.6–8.7)
RBC # BLD: 3.96 M/UL — LOW (ref 4.2–5.8)
RBC # FLD: 15.3 % — HIGH (ref 10.3–14.5)
SODIUM SERPL-SCNC: 134 MMOL/L — LOW (ref 135–145)
WBC # BLD: 19.04 K/UL — HIGH (ref 3.8–10.5)
WBC # FLD AUTO: 19.04 K/UL — HIGH (ref 3.8–10.5)

## 2024-10-18 PROCEDURE — 99233 SBSQ HOSP IP/OBS HIGH 50: CPT

## 2024-10-18 RX ORDER — GUAIFENESIN 100 MG/5ML
1200 LIQUID ORAL EVERY 12 HOURS
Refills: 0 | Status: DISCONTINUED | OUTPATIENT
Start: 2024-10-18 | End: 2024-10-21

## 2024-10-18 RX ORDER — FLUTICASONE PROPIONATE 50 UG/1
1 SPRAY, METERED NASAL
Refills: 0 | Status: DISCONTINUED | OUTPATIENT
Start: 2024-10-18 | End: 2024-10-21

## 2024-10-18 RX ORDER — CALCIUM CARBONATE 215(500)MG
1 TABLET,CHEWABLE ORAL
Refills: 0 | Status: DISCONTINUED | OUTPATIENT
Start: 2024-10-18 | End: 2024-10-21

## 2024-10-18 RX ADMIN — OXCARBAZEPINE 300 MILLIGRAM(S): 60 SUSPENSION ORAL at 05:08

## 2024-10-18 RX ADMIN — Medication 10 MILLIGRAM(S): at 21:11

## 2024-10-18 RX ADMIN — METHYLPREDNISOLONE ACETATE 40 MILLIGRAM(S): 80 INJECTION, SUSPENSION INTRALESIONAL; INTRAMUSCULAR; INTRASYNOVIAL; SOFT TISSUE at 15:56

## 2024-10-18 RX ADMIN — AZITHROMYCIN DIHYDRATE 255 MILLIGRAM(S): 200 POWDER, FOR SUSPENSION ORAL at 11:28

## 2024-10-18 RX ADMIN — METHYLPREDNISOLONE ACETATE 40 MILLIGRAM(S): 80 INJECTION, SUSPENSION INTRALESIONAL; INTRAMUSCULAR; INTRASYNOVIAL; SOFT TISSUE at 21:11

## 2024-10-18 RX ADMIN — FLUTICASONE PROPIONATE AND SALMETEROL XINAFOATE 1 DOSE(S): 230; 21 AEROSOL, METERED RESPIRATORY (INHALATION) at 09:03

## 2024-10-18 RX ADMIN — IPRATROPIUM BROMIDE AND ALBUTEROL SULFATE 3 MILLILITER(S): .5; 2.5 SOLUTION RESPIRATORY (INHALATION) at 14:37

## 2024-10-18 RX ADMIN — OXCARBAZEPINE 300 MILLIGRAM(S): 60 SUSPENSION ORAL at 15:56

## 2024-10-18 RX ADMIN — IPRATROPIUM BROMIDE AND ALBUTEROL SULFATE 3 MILLILITER(S): .5; 2.5 SOLUTION RESPIRATORY (INHALATION) at 03:28

## 2024-10-18 RX ADMIN — GUAIFENESIN 1200 MILLIGRAM(S): 100 LIQUID ORAL at 17:32

## 2024-10-18 RX ADMIN — Medication 81 MILLIGRAM(S): at 11:27

## 2024-10-18 RX ADMIN — IPRATROPIUM BROMIDE AND ALBUTEROL SULFATE 3 MILLILITER(S): .5; 2.5 SOLUTION RESPIRATORY (INHALATION) at 09:03

## 2024-10-18 RX ADMIN — FLUTICASONE PROPIONATE 1 SPRAY(S): 50 SPRAY, METERED NASAL at 17:58

## 2024-10-18 RX ADMIN — IPRATROPIUM BROMIDE AND ALBUTEROL SULFATE 3 MILLILITER(S): .5; 2.5 SOLUTION RESPIRATORY (INHALATION) at 21:43

## 2024-10-18 RX ADMIN — FLUTICASONE PROPIONATE AND SALMETEROL XINAFOATE 1 DOSE(S): 230; 21 AEROSOL, METERED RESPIRATORY (INHALATION) at 21:43

## 2024-10-18 RX ADMIN — Medication 40 MILLIGRAM(S): at 05:08

## 2024-10-18 RX ADMIN — OXCARBAZEPINE 300 MILLIGRAM(S): 60 SUSPENSION ORAL at 21:11

## 2024-10-18 RX ADMIN — Medication 1000 MILLIGRAM(S): at 11:27

## 2024-10-18 RX ADMIN — METHYLPREDNISOLONE ACETATE 40 MILLIGRAM(S): 80 INJECTION, SUSPENSION INTRALESIONAL; INTRAMUSCULAR; INTRASYNOVIAL; SOFT TISSUE at 05:08

## 2024-10-18 RX ADMIN — Medication 2.5 MILLIGRAM(S): at 05:08

## 2024-10-19 PROCEDURE — 99233 SBSQ HOSP IP/OBS HIGH 50: CPT

## 2024-10-19 RX ORDER — METHYLPREDNISOLONE ACETATE 80 MG/ML
40 INJECTION, SUSPENSION INTRALESIONAL; INTRAMUSCULAR; INTRASYNOVIAL; SOFT TISSUE EVERY 12 HOURS
Refills: 0 | Status: DISCONTINUED | OUTPATIENT
Start: 2024-10-19 | End: 2024-10-21

## 2024-10-19 RX ADMIN — Medication 10 MILLIGRAM(S): at 21:55

## 2024-10-19 RX ADMIN — Medication 40 MILLIGRAM(S): at 05:10

## 2024-10-19 RX ADMIN — OXCARBAZEPINE 300 MILLIGRAM(S): 60 SUSPENSION ORAL at 14:22

## 2024-10-19 RX ADMIN — FLUTICASONE PROPIONATE 1 SPRAY(S): 50 SPRAY, METERED NASAL at 06:11

## 2024-10-19 RX ADMIN — GUAIFENESIN 1200 MILLIGRAM(S): 100 LIQUID ORAL at 17:40

## 2024-10-19 RX ADMIN — GUAIFENESIN 1200 MILLIGRAM(S): 100 LIQUID ORAL at 05:09

## 2024-10-19 RX ADMIN — METHYLPREDNISOLONE ACETATE 40 MILLIGRAM(S): 80 INJECTION, SUSPENSION INTRALESIONAL; INTRAMUSCULAR; INTRASYNOVIAL; SOFT TISSUE at 17:39

## 2024-10-19 RX ADMIN — FLUTICASONE PROPIONATE AND SALMETEROL XINAFOATE 1 DOSE(S): 230; 21 AEROSOL, METERED RESPIRATORY (INHALATION) at 07:53

## 2024-10-19 RX ADMIN — Medication 1000 MILLIGRAM(S): at 12:04

## 2024-10-19 RX ADMIN — AZITHROMYCIN DIHYDRATE 255 MILLIGRAM(S): 200 POWDER, FOR SUSPENSION ORAL at 12:04

## 2024-10-19 RX ADMIN — OXCARBAZEPINE 300 MILLIGRAM(S): 60 SUSPENSION ORAL at 21:55

## 2024-10-19 RX ADMIN — FLUTICASONE PROPIONATE AND SALMETEROL XINAFOATE 1 DOSE(S): 230; 21 AEROSOL, METERED RESPIRATORY (INHALATION) at 20:18

## 2024-10-19 RX ADMIN — Medication 2.5 MILLIGRAM(S): at 05:09

## 2024-10-19 RX ADMIN — IPRATROPIUM BROMIDE AND ALBUTEROL SULFATE 3 MILLILITER(S): .5; 2.5 SOLUTION RESPIRATORY (INHALATION) at 07:54

## 2024-10-19 RX ADMIN — METHYLPREDNISOLONE ACETATE 40 MILLIGRAM(S): 80 INJECTION, SUSPENSION INTRALESIONAL; INTRAMUSCULAR; INTRASYNOVIAL; SOFT TISSUE at 05:14

## 2024-10-19 RX ADMIN — Medication 81 MILLIGRAM(S): at 14:22

## 2024-10-19 RX ADMIN — FLUTICASONE PROPIONATE 1 SPRAY(S): 50 SPRAY, METERED NASAL at 17:39

## 2024-10-19 RX ADMIN — IPRATROPIUM BROMIDE AND ALBUTEROL SULFATE 3 MILLILITER(S): .5; 2.5 SOLUTION RESPIRATORY (INHALATION) at 20:19

## 2024-10-19 RX ADMIN — OXCARBAZEPINE 300 MILLIGRAM(S): 60 SUSPENSION ORAL at 05:10

## 2024-10-19 RX ADMIN — IPRATROPIUM BROMIDE AND ALBUTEROL SULFATE 3 MILLILITER(S): .5; 2.5 SOLUTION RESPIRATORY (INHALATION) at 14:13

## 2024-10-19 RX ADMIN — IPRATROPIUM BROMIDE AND ALBUTEROL SULFATE 3 MILLILITER(S): .5; 2.5 SOLUTION RESPIRATORY (INHALATION) at 03:42

## 2024-10-19 NOTE — DIETITIAN INITIAL EVALUATION ADULT - PERTINENT MEDS FT
MEDICATIONS  (STANDING):  albuterol/ipratropium for Nebulization 3 milliLiter(s) Nebulizer every 6 hours  aspirin  chewable 81 milliGRAM(s) Oral daily  azithromycin  IVPB 500 milliGRAM(s) IV Intermittent every 24 hours  cefTRIAXone Injectable. 1000 milliGRAM(s) IV Push every 24 hours  enoxaparin Injectable 40 milliGRAM(s) SubCutaneous every 24 hours  fluticasone propionate 50 MICROgram(s)/spray Nasal Spray 1 Spray(s) Both Nostrils two times a day  fluticasone propionate/ salmeterol 250-50 MICROgram(s) Diskus 1 Dose(s) Inhalation two times a day  guaiFENesin ER 1200 milliGRAM(s) Oral every 12 hours  influenza  Vaccine (HIGH DOSE) 0.5 milliLiter(s) IntraMuscular once  lisinopril 2.5 milliGRAM(s) Oral daily  methylPREDNISolone sodium succinate Injectable 40 milliGRAM(s) IV Push every 8 hours  OXcarbazepine 300 milliGRAM(s) Oral <User Schedule>  rosuvastatin 10 milliGRAM(s) Oral at bedtime    MEDICATIONS  (PRN):  acetaminophen     Tablet .. 650 milliGRAM(s) Oral every 6 hours PRN Temp greater or equal to 38C (100.4F), Mild Pain (1 - 3)  aluminum hydroxide/magnesium hydroxide/simethicone Suspension 30 milliLiter(s) Oral every 4 hours PRN Dyspepsia  melatonin 3 milliGRAM(s) Oral at bedtime PRN Insomnia  ondansetron Injectable 4 milliGRAM(s) IV Push every 8 hours PRN Nausea and/or Vomiting  sodium chloride 0.65% Nasal 1 Spray(s) Both Nostrils every 2 hours PRN Nasal Congestion

## 2024-10-19 NOTE — DIETITIAN INITIAL EVALUATION ADULT - ORAL INTAKE PTA/DIET HISTORY
Met with pt who reports no weight loss. Pt with dentures however cannot wear them at this time. Pt asking for soft and bite sized diet ordered today by PA. Reviewed diet with pt who claims to follow diet. Encouraged high protein foods.

## 2024-10-19 NOTE — DIETITIAN INITIAL EVALUATION ADULT - OTHER INFO
80yo male with HTN, T2DM,  CAD, BPH, COPD, brain hemangioma s/p laser treatment in 2008, seizures, myasthenia gravis who presents to the ED complaining of SOB. In the ED found to be febrile to 103.6, tachycardic to 109, normotensive and on 4-6L NC. CXR done without clear consolidation or infiltrates. Blood gas w/o hypercapnia. WBC 20.07. Given abx, duoneb and steroids and admitted to medicine for sepsis and COPD exacerbation.

## 2024-10-19 NOTE — DIETITIAN INITIAL EVALUATION ADULT - NS FNS DIET ORDER
Diet, Soft and Bite Sized:   Consistent Carbohydrate {Evening Snack} (CSTCHOSN) (10-19-24 @ 09:47)

## 2024-10-19 NOTE — DIETITIAN INITIAL EVALUATION ADULT - PERTINENT LABORATORY DATA
10-18    134[L]  |  98  |  17.8  ----------------------------<  222[H]  3.9   |  23.0  |  0.58    Ca    8.7      18 Oct 2024 05:26  Mg     1.9     10-17    TPro  6.6  /  Alb  3.3  /  TBili  0.4  /  DBili  x   /  AST  58[H]  /  ALT  55[H]  /  AlkPhos  96  10-18

## 2024-10-20 LAB
ANION GAP SERPL CALC-SCNC: 11 MMOL/L — SIGNIFICANT CHANGE UP (ref 5–17)
BUN SERPL-MCNC: 22.1 MG/DL — HIGH (ref 8–20)
CALCIUM SERPL-MCNC: 8.7 MG/DL — SIGNIFICANT CHANGE UP (ref 8.4–10.5)
CHLORIDE SERPL-SCNC: 97 MMOL/L — SIGNIFICANT CHANGE UP (ref 96–108)
CO2 SERPL-SCNC: 27 MMOL/L — SIGNIFICANT CHANGE UP (ref 22–29)
CREAT SERPL-MCNC: 0.76 MG/DL — SIGNIFICANT CHANGE UP (ref 0.5–1.3)
EGFR: 90 ML/MIN/1.73M2 — SIGNIFICANT CHANGE UP
GLUCOSE SERPL-MCNC: 145 MG/DL — HIGH (ref 70–99)
HCT VFR BLD CALC: 34.5 % — LOW (ref 39–50)
HGB BLD-MCNC: 11.4 G/DL — LOW (ref 13–17)
MCHC RBC-ENTMCNC: 31.8 PG — SIGNIFICANT CHANGE UP (ref 27–34)
MCHC RBC-ENTMCNC: 33 GM/DL — SIGNIFICANT CHANGE UP (ref 32–36)
MCV RBC AUTO: 96.1 FL — SIGNIFICANT CHANGE UP (ref 80–100)
PLATELET # BLD AUTO: 142 K/UL — LOW (ref 150–400)
POTASSIUM SERPL-MCNC: 4.3 MMOL/L — SIGNIFICANT CHANGE UP (ref 3.5–5.3)
POTASSIUM SERPL-SCNC: 4.3 MMOL/L — SIGNIFICANT CHANGE UP (ref 3.5–5.3)
RBC # BLD: 3.59 M/UL — LOW (ref 4.2–5.8)
RBC # FLD: 15.4 % — HIGH (ref 10.3–14.5)
SODIUM SERPL-SCNC: 135 MMOL/L — SIGNIFICANT CHANGE UP (ref 135–145)
WBC # BLD: 14.34 K/UL — HIGH (ref 3.8–10.5)
WBC # FLD AUTO: 14.34 K/UL — HIGH (ref 3.8–10.5)

## 2024-10-20 PROCEDURE — 99233 SBSQ HOSP IP/OBS HIGH 50: CPT

## 2024-10-20 RX ORDER — GUAIFENESIN 100 MG/5ML
100 LIQUID ORAL EVERY 6 HOURS
Refills: 0 | Status: DISCONTINUED | OUTPATIENT
Start: 2024-10-20 | End: 2024-10-20

## 2024-10-20 RX ORDER — DILTIAZEM HCL 5 MG/ML
10 VIAL (ML) INTRAVENOUS ONCE
Refills: 0 | Status: COMPLETED | OUTPATIENT
Start: 2024-10-20 | End: 2024-10-20

## 2024-10-20 RX ORDER — HYDRALAZINE HYDROCHLORIDE 50 MG/1
10 TABLET, FILM COATED ORAL ONCE
Refills: 0 | Status: COMPLETED | OUTPATIENT
Start: 2024-10-20 | End: 2024-10-20

## 2024-10-20 RX ADMIN — METHYLPREDNISOLONE ACETATE 40 MILLIGRAM(S): 80 INJECTION, SUSPENSION INTRALESIONAL; INTRAMUSCULAR; INTRASYNOVIAL; SOFT TISSUE at 17:15

## 2024-10-20 RX ADMIN — GUAIFENESIN 1200 MILLIGRAM(S): 100 LIQUID ORAL at 17:18

## 2024-10-20 RX ADMIN — IPRATROPIUM BROMIDE AND ALBUTEROL SULFATE 3 MILLILITER(S): .5; 2.5 SOLUTION RESPIRATORY (INHALATION) at 20:52

## 2024-10-20 RX ADMIN — GUAIFENESIN 100 MILLIGRAM(S): 100 LIQUID ORAL at 13:57

## 2024-10-20 RX ADMIN — FLUTICASONE PROPIONATE 1 SPRAY(S): 50 SPRAY, METERED NASAL at 05:20

## 2024-10-20 RX ADMIN — IPRATROPIUM BROMIDE AND ALBUTEROL SULFATE 3 MILLILITER(S): .5; 2.5 SOLUTION RESPIRATORY (INHALATION) at 04:36

## 2024-10-20 RX ADMIN — Medication 1000 MILLIGRAM(S): at 11:26

## 2024-10-20 RX ADMIN — AZITHROMYCIN DIHYDRATE 255 MILLIGRAM(S): 200 POWDER, FOR SUSPENSION ORAL at 11:26

## 2024-10-20 RX ADMIN — IPRATROPIUM BROMIDE AND ALBUTEROL SULFATE 3 MILLILITER(S): .5; 2.5 SOLUTION RESPIRATORY (INHALATION) at 13:50

## 2024-10-20 RX ADMIN — FLUTICASONE PROPIONATE AND SALMETEROL XINAFOATE 1 DOSE(S): 230; 21 AEROSOL, METERED RESPIRATORY (INHALATION) at 20:53

## 2024-10-20 RX ADMIN — OXCARBAZEPINE 300 MILLIGRAM(S): 60 SUSPENSION ORAL at 13:28

## 2024-10-20 RX ADMIN — Medication 81 MILLIGRAM(S): at 11:26

## 2024-10-20 RX ADMIN — FLUTICASONE PROPIONATE 1 SPRAY(S): 50 SPRAY, METERED NASAL at 17:18

## 2024-10-20 RX ADMIN — Medication 10 MILLIGRAM(S): at 18:09

## 2024-10-20 RX ADMIN — GUAIFENESIN 1200 MILLIGRAM(S): 100 LIQUID ORAL at 05:20

## 2024-10-20 RX ADMIN — FLUTICASONE PROPIONATE AND SALMETEROL XINAFOATE 1 DOSE(S): 230; 21 AEROSOL, METERED RESPIRATORY (INHALATION) at 08:08

## 2024-10-20 RX ADMIN — OXCARBAZEPINE 300 MILLIGRAM(S): 60 SUSPENSION ORAL at 05:19

## 2024-10-20 RX ADMIN — METHYLPREDNISOLONE ACETATE 40 MILLIGRAM(S): 80 INJECTION, SUSPENSION INTRALESIONAL; INTRAMUSCULAR; INTRASYNOVIAL; SOFT TISSUE at 05:19

## 2024-10-20 RX ADMIN — Medication 40 MILLIGRAM(S): at 05:19

## 2024-10-20 RX ADMIN — IPRATROPIUM BROMIDE AND ALBUTEROL SULFATE 3 MILLILITER(S): .5; 2.5 SOLUTION RESPIRATORY (INHALATION) at 08:09

## 2024-10-20 RX ADMIN — OXCARBAZEPINE 300 MILLIGRAM(S): 60 SUSPENSION ORAL at 21:43

## 2024-10-20 RX ADMIN — Medication 10 MILLIGRAM(S): at 21:43

## 2024-10-20 RX ADMIN — Medication 2.5 MILLIGRAM(S): at 05:19

## 2024-10-20 RX ADMIN — HYDRALAZINE HYDROCHLORIDE 10 MILLIGRAM(S): 50 TABLET, FILM COATED ORAL at 19:02

## 2024-10-20 NOTE — PROGRESS NOTE ADULT - ASSESSMENT
78yo male with HTN, T2DM,  CAD, BPH, COPD, brain hemangioma s/p laser treatment in 2008, seizures, myasthenia gravis who presents to the ED complaining of SOB. In the ED found to be febrile to 103.6, tachycardic to 109, normotensive and on 4-6L NC. CXR done without clear consolidation or infiltrates. Blood gas w/o hypercapnia. WBC 20.07. Given abx, duoneb and steroids and admitted to medicine for sepsis and COPD exacerbation.    Acute on chronic hypoxic respiratory failure secondary to COPD exacerbation, secondary enterovirus infection, PNA cant be excluded   Afebrile, Elevated WBC   RVP (+)   - c/w DUONEB  q6hr and Symbicort   - solumedrol 40mg q8hr for today and start tapering tomorrow   c/w Azithromycin and Ceftriaxone for concern of PNA   Add Mucinex and Flonase/Nasal saline   Get out of bed to chair     Elevated liver enzymes of unclear etiology  - no abd pain, n/v  - trend LFTs   - consider holding rosuvastatin if trending up    Hx of CAD and HTN  BP controlled   c/w  lisinopril      Hx of Seizure disorder  c/w  oxcarbazepine 300mg TID    DVT ppx: lovenox  
80yo male with HTN, T2DM,  CAD, BPH, COPD, brain hemangioma s/p laser treatment in 2008, seizures, myasthenia gravis who presents to the ED complaining of SOB. In the ED found to be febrile to 103.6, tachycardic to 109, normotensive and on 4-6L NC. CXR done without clear consolidation or infiltrates. Blood gas w/o hypercapnia. WBC 20.07. Given abx, duoneb and steroids and admitted to medicine for sepsis and COPD exacerbation.    #Acute on chronic hypoxic respiratory failure secondary to COPD exacerbation, secondary enterovirus infection, PNA cant be excluded   - Afebrile, Elevated WBC   - RVP (+)   - c/w DUONEB  q6hr and Symbicort   - solumedrol tapered to q12h today   - c/w Azithromycin and Ceftriaxone for concern of PNA   - c/w mucinex and Flonase/Nasal saline   - OOB, IS     #Elevated liver enzymes of unclear etiology, improving   - no abd pain, n/v  - trend LFTs   - consider holding rosuvastatin if trending up    #Hx of CAD and HTN  - BP controlled   - c/w  lisinopril    #Hx of Seizure disorder  - c/w  oxcarbazepine 300mg TID    DVT ppx: lovenox
78yo male with HTN, T2DM,  CAD, BPH, COPD, brain hemangioma s/p laser treatment in 2008, seizures, myasthenia gravis who presents to the ED complaining of SOB. In the ED found to be febrile to 103.6, tachycardic to 109, normotensive and on 4-6L NC. CXR done without clear consolidation or infiltrates. Blood gas w/o hypercapnia. WBC 20.07. Given abx, duoneb and steroids and admitted to medicine for sepsis and COPD exacerbation.    #Acute on chronic hypoxic respiratory failure secondary to COPD exacerbation, secondary enterovirus infection, PNA cant be excluded   - Afebrile, Elevated WBC   - RVP (+)   - c/w DUONEB q6hr and Symbicort   - will continue solumedrol given wheezing   - c/w Azithromycin and Ceftriaxone for concern of PNA   - c/w mucinex and Flonase/Nasal saline   - OOB, IS     #Elevated liver enzymes of unclear etiology, improving   - no abd pain, n/v  - trend LFTs   - consider holding rosuvastatin if trending up    #Hx of CAD and HTN  - BP controlled   - c/w lisinopril    #Hx of Seizure disorder  - c/w oxcarbazepine 300mg TID    DVT ppx: lovenox

## 2024-10-20 NOTE — PROGRESS NOTE ADULT - SUBJECTIVE AND OBJECTIVE BOX
Patient is a 79y old  Male who presents with a chief complaint of sepsis, COPD exacerbation (17 Oct 2024 14:19)      INTERVAL HPI/OVERNIGHT EVENTS: seen and examined. C/o cough and wheezing     MEDICATIONS  (STANDING):  albuterol/ipratropium for Nebulization 3 milliLiter(s) Nebulizer every 6 hours  aspirin  chewable 81 milliGRAM(s) Oral daily  azithromycin  IVPB 500 milliGRAM(s) IV Intermittent every 24 hours  cefTRIAXone Injectable. 1000 milliGRAM(s) IV Push every 24 hours  enoxaparin Injectable 40 milliGRAM(s) SubCutaneous every 24 hours  fluticasone propionate/ salmeterol 250-50 MICROgram(s) Diskus 1 Dose(s) Inhalation two times a day  influenza  Vaccine (HIGH DOSE) 0.5 milliLiter(s) IntraMuscular once  lisinopril 2.5 milliGRAM(s) Oral daily  methylPREDNISolone sodium succinate Injectable 40 milliGRAM(s) IV Push every 8 hours  OXcarbazepine 300 milliGRAM(s) Oral <User Schedule>  rosuvastatin 10 milliGRAM(s) Oral at bedtime    MEDICATIONS  (PRN):  acetaminophen     Tablet .. 650 milliGRAM(s) Oral every 6 hours PRN Temp greater or equal to 38C (100.4F), Mild Pain (1 - 3)  aluminum hydroxide/magnesium hydroxide/simethicone Suspension 30 milliLiter(s) Oral every 4 hours PRN Dyspepsia  melatonin 3 milliGRAM(s) Oral at bedtime PRN Insomnia  ondansetron Injectable 4 milliGRAM(s) IV Push every 8 hours PRN Nausea and/or Vomiting      Allergies    No Known Allergies    Intolerances        REVIEW OF SYSTEMS:  CONSTITUTIONAL: No fever, weight loss, or fatigue  RESPIRATORY: No cough, wheezing, chills or hemoptysis; No shortness of breath  CARDIOVASCULAR: No chest pain, palpitations, dizziness, or leg swelling  GASTROINTESTINAL: No abdominal or epigastric pain. No nausea, vomiting, or hematemesis; No diarrhea or constipation. No melena or hematochezia.  NEUROLOGICAL: No headaches, memory loss, loss of strength, numbness, or tremors  MUSCULOSKELETAL: No joint pain or swelling; No muscle, back, or extremity pain      Vital Signs Last 24 Hrs  T(C): 36.4 (18 Oct 2024 04:17), Max: 39.8 (17 Oct 2024 12:00)  T(F): 97.6 (18 Oct 2024 04:17), Max: 103.6 (17 Oct 2024 12:00)  HR: 103 (18 Oct 2024 09:05) (68 - 109)  BP: 137/69 (18 Oct 2024 04:17) (108/53 - 161/86)  BP(mean): 70 (17 Oct 2024 13:15) (70 - 76)  RR: 18 (18 Oct 2024 09:05) (16 - 26)  SpO2: 95% (18 Oct 2024 09:05) (94% - 100%)    Parameters below as of 18 Oct 2024 09:05  Patient On (Oxygen Delivery Method): nasal cannula  O2 Flow (L/min): 4      PHYSICAL EXAM:  GENERAL: NAD, pleasant elderly male, sitting on the bed, [place]   HEAD:  Atraumatic, Normocephalic  EYES: conjunctiva and sclera clear  NECK: Supple, No JVD  NERVOUS SYSTEM:  Alert & Oriented X3, No gross focal deficits  CHEST/LUNG: diffuse wheezing, no rales   HEART: Regular rate and rhythm; No murmurs, rubs, or gallops  ABDOMEN: Soft, Nontender, Nondistended; Bowel sounds present  EXTREMITIES:  No clubbing, cyanosis, or edema  SKIN: No rashes or lesions    LABS:                        12.3   19.04 )-----------( 126      ( 18 Oct 2024 05:26 )             36.9     10-18    134[L]  |  98  |  17.8  ----------------------------<  222[H]  3.9   |  23.0  |  0.58    Ca    8.7      18 Oct 2024 05:26  Mg     1.9     10-17    TPro  6.6  /  Alb  3.3  /  TBili  0.4  /  DBili  x   /  AST  58[H]  /  ALT  55[H]  /  AlkPhos  96  10-18    PT/INR - ( 17 Oct 2024 12:00 )   PT: 13.1 sec;   INR: 1.13 ratio         PTT - ( 17 Oct 2024 12:00 )  PTT:29.0 sec  Urinalysis Basic - ( 18 Oct 2024 05:26 )    Color: x / Appearance: x / SG: x / pH: x  Gluc: 222 mg/dL / Ketone: x  / Bili: x / Urobili: x   Blood: x / Protein: x / Nitrite: x   Leuk Esterase: x / RBC: x / WBC x   Sq Epi: x / Non Sq Epi: x / Bacteria: x      CAPILLARY BLOOD GLUCOSE          RADIOLOGY & ADDITIONAL TESTS:    Imaging Personally Reviewed:  [ x] YES  [ ] NO    Consultant(s) Notes Reviewed:  [x ] YES  [ ] NO    Care Discussed with Consultants/Other Providers [ ] YES  [ ] NO    Plan of Care discussed with Housestaff [x ]YES [ ] NO
[facility] Division of Hospital Medicine    INTERVAL HISTORY:  Overnight, no acute events.     Patient seen and examined at bedside this morning sitting in chair. Reports feeling better than on presentation. Patient denies chest pain, SOB, abd pain, N/V, fever, chills, dysuria or any other complaints.     MEDICATIONS  (STANDING):  albuterol/ipratropium for Nebulization 3 milliLiter(s) Nebulizer every 6 hours  aspirin  chewable 81 milliGRAM(s) Oral daily  azithromycin  IVPB 500 milliGRAM(s) IV Intermittent every 24 hours  cefTRIAXone Injectable. 1000 milliGRAM(s) IV Push every 24 hours  enoxaparin Injectable 40 milliGRAM(s) SubCutaneous every 24 hours  fluticasone propionate 50 MICROgram(s)/spray Nasal Spray 1 Spray(s) Both Nostrils two times a day  fluticasone propionate/ salmeterol 250-50 MICROgram(s) Diskus 1 Dose(s) Inhalation two times a day  guaiFENesin ER 1200 milliGRAM(s) Oral every 12 hours  influenza  Vaccine (HIGH DOSE) 0.5 milliLiter(s) IntraMuscular once  lisinopril 2.5 milliGRAM(s) Oral daily  methylPREDNISolone sodium succinate Injectable 40 milliGRAM(s) IV Push every 12 hours  OXcarbazepine 300 milliGRAM(s) Oral <User Schedule>  rosuvastatin 10 milliGRAM(s) Oral at bedtime    MEDICATIONS  (PRN):  acetaminophen     Tablet .. 650 milliGRAM(s) Oral every 6 hours PRN Temp greater or equal to 38C (100.4F), Mild Pain (1 - 3)  aluminum hydroxide/magnesium hydroxide/simethicone Suspension 30 milliLiter(s) Oral every 4 hours PRN Dyspepsia  melatonin 3 milliGRAM(s) Oral at bedtime PRN Insomnia  ondansetron Injectable 4 milliGRAM(s) IV Push every 8 hours PRN Nausea and/or Vomiting  sodium chloride 0.65% Nasal 1 Spray(s) Both Nostrils every 2 hours PRN Nasal Congestion        I&O's Summary    18 Oct 2024 07:01  -  19 Oct 2024 07:00  --------------------------------------------------------  IN: 0 mL / OUT: 400 mL / NET: -400 mL        PHYSICAL EXAM:  Vital Signs Last 24 Hrs  T(C): 36.5 (19 Oct 2024 08:00), Max: 36.7 (18 Oct 2024 17:23)  T(F): 97.7 (19 Oct 2024 08:00), Max: 98.1 (18 Oct 2024 17:23)  HR: 80 (19 Oct 2024 08:32) (72 - 102)  BP: 138/80 (19 Oct 2024 08:00) (135/84 - 154/87)  BP(mean): --  RR: 18 (19 Oct 2024 08:00) (18 - 18)  SpO2: 93% (19 Oct 2024 08:00) (93% - 99%)    Parameters below as of 19 Oct 2024 08:00  Patient On (Oxygen Delivery Method): nasal cannula  O2 Flow (L/min): 4        CONSTITUTIONAL: No apparent distress  HEENT: Normocephalic, Atraumatic.   RESPIRATORY:  diffuse wheezing   CARDIOVASCULAR: Regular rate and rhythm, No lower extremity edema  ABDOMEN: Soft, non-distended, nontender to palpation, +BS  MUSCLOSKELETAL: moving all 4 extremities spontaneously  PSYCH: thoughts linear, affect appropriate  NEUROLOGY: Alert, Oriented x3    LABS:                        12.3   19.04 )-----------( 126      ( 18 Oct 2024 05:26 )             36.9     10-18    134[L]  |  98  |  17.8  ----------------------------<  222[H]  3.9   |  23.0  |  0.58    Ca    8.7      18 Oct 2024 05:26    TPro  6.6  /  Alb  3.3  /  TBili  0.4  /  DBili  x   /  AST  58[H]  /  ALT  55[H]  /  AlkPhos  96  10-18          Urinalysis Basic - ( 18 Oct 2024 05:26 )    Color: x / Appearance: x / SG: x / pH: x  Gluc: 222 mg/dL / Ketone: x  / Bili: x / Urobili: x   Blood: x / Protein: x / Nitrite: x   Leuk Esterase: x / RBC: x / WBC x   Sq Epi: x / Non Sq Epi: x / Bacteria: x        Culture - Blood (collected 17 Oct 2024 12:00)  Source: .Blood BLOOD  Preliminary Report (18 Oct 2024 22:01):    No growth at 24 hours      CAPILLARY BLOOD GLUCOSE            RADIOLOGY & ADDITIONAL TESTS:  Results Reviewed                            
[facility] Division of Hospital Medicine    INTERVAL HISTORY:  Overnight, no acute events.     Patient seen and examined at bedside this morning. Reports having a lot of wet cough. Patient denies chest pain, SOB, abd pain, N/V, fever, chills, dysuria or any other complaints.     MEDICATIONS  (STANDING):  albuterol/ipratropium for Nebulization 3 milliLiter(s) Nebulizer every 6 hours  aspirin  chewable 81 milliGRAM(s) Oral daily  azithromycin  IVPB 500 milliGRAM(s) IV Intermittent every 24 hours  cefTRIAXone Injectable. 1000 milliGRAM(s) IV Push every 24 hours  enoxaparin Injectable 40 milliGRAM(s) SubCutaneous every 24 hours  fluticasone propionate 50 MICROgram(s)/spray Nasal Spray 1 Spray(s) Both Nostrils two times a day  fluticasone propionate/ salmeterol 250-50 MICROgram(s) Diskus 1 Dose(s) Inhalation two times a day  guaiFENesin ER 1200 milliGRAM(s) Oral every 12 hours  influenza  Vaccine (HIGH DOSE) 0.5 milliLiter(s) IntraMuscular once  lisinopril 2.5 milliGRAM(s) Oral daily  methylPREDNISolone sodium succinate Injectable 40 milliGRAM(s) IV Push every 12 hours  OXcarbazepine 300 milliGRAM(s) Oral <User Schedule>  rosuvastatin 10 milliGRAM(s) Oral at bedtime    MEDICATIONS  (PRN):  acetaminophen     Tablet .. 650 milliGRAM(s) Oral every 6 hours PRN Temp greater or equal to 38C (100.4F), Mild Pain (1 - 3)  aluminum hydroxide/magnesium hydroxide/simethicone Suspension 30 milliLiter(s) Oral every 4 hours PRN Dyspepsia  guaiFENesin Oral Liquid (Sugar-Free) 100 milliGRAM(s) Oral every 6 hours PRN Cough  melatonin 3 milliGRAM(s) Oral at bedtime PRN Insomnia  ondansetron Injectable 4 milliGRAM(s) IV Push every 8 hours PRN Nausea and/or Vomiting  sodium chloride 0.65% Nasal 1 Spray(s) Both Nostrils every 2 hours PRN Nasal Congestion        I&O's Summary      PHYSICAL EXAM:  Vital Signs Last 24 Hrs  T(C): 36.3 (20 Oct 2024 13:26), Max: 36.9 (19 Oct 2024 17:33)  T(F): 97.4 (20 Oct 2024 13:26), Max: 98.4 (19 Oct 2024 17:33)  HR: 80 (20 Oct 2024 13:26) (76 - 97)  BP: 157/79 (20 Oct 2024 13:26) (129/67 - 157/79)  BP(mean): --  RR: 17 (20 Oct 2024 13:26) (17 - 18)  SpO2: 94% (20 Oct 2024 13:26) (94% - 98%)    Parameters below as of 20 Oct 2024 13:26  Patient On (Oxygen Delivery Method): nasal cannula  O2 Flow (L/min): 2    CONSTITUTIONAL: No apparent distress  HEENT: Normocephalic, Atraumatic.   RESPIRATORY:  diffuse wheezing, improved   CARDIOVASCULAR: Regular rate and rhythm, No lower extremity edema  ABDOMEN: Soft, non-distended, nontender to palpation, +BS  MUSCLOSKELETAL: moving all 4 extremities spontaneously  PSYCH: thoughts linear, affect appropriate  NEUROLOGY: Alert, Oriented x3    LABS:                        11.4   14.34 )-----------( 142      ( 20 Oct 2024 05:57 )             34.5     10-20    135  |  97  |  22.1[H]  ----------------------------<  145[H]  4.3   |  27.0  |  0.76    Ca    8.7      20 Oct 2024 05:57            Urinalysis Basic - ( 20 Oct 2024 05:57 )    Color: x / Appearance: x / SG: x / pH: x  Gluc: 145 mg/dL / Ketone: x  / Bili: x / Urobili: x   Blood: x / Protein: x / Nitrite: x   Leuk Esterase: x / RBC: x / WBC x   Sq Epi: x / Non Sq Epi: x / Bacteria: x        CAPILLARY BLOOD GLUCOSE            RADIOLOGY & ADDITIONAL TESTS:  Results Reviewed

## 2024-10-20 NOTE — PROGRESS NOTE ADULT - TIME BILLING
Time spent reviewing the chart documentation, reviewing labs and imaging studies, evaluating the patient, discussing the plan of care with the consultants & medical team, and documenting.
reviewing the chart documentation, reviewing labs and imaging studies, evaluating the patient, discussing the plan of care with the consultants & medical team, and documenting.

## 2024-10-21 ENCOUNTER — TRANSCRIPTION ENCOUNTER (OUTPATIENT)
Age: 79
End: 2024-10-21

## 2024-10-21 VITALS
SYSTOLIC BLOOD PRESSURE: 147 MMHG | HEART RATE: 82 BPM | RESPIRATION RATE: 19 BRPM | OXYGEN SATURATION: 91 % | DIASTOLIC BLOOD PRESSURE: 80 MMHG | TEMPERATURE: 98 F

## 2024-10-21 PROCEDURE — 99239 HOSP IP/OBS DSCHRG MGMT >30: CPT

## 2024-10-21 RX ORDER — FLUTICASONE PROPIONATE 50 UG/1
1 SPRAY, METERED NASAL
Qty: 0 | Refills: 0 | DISCHARGE
Start: 2024-10-21

## 2024-10-21 RX ORDER — CALCIUM CARBONATE 215(500)MG
1 TABLET,CHEWABLE ORAL
Qty: 0 | Refills: 0 | DISCHARGE
Start: 2024-10-21

## 2024-10-21 RX ORDER — ASPIRIN/MAG CARB/ALUMINUM AMIN 325 MG
1 TABLET ORAL
Qty: 0 | Refills: 0 | DISCHARGE
Start: 2024-10-21

## 2024-10-21 RX ORDER — GUAIFENESIN 100 MG/5ML
10 LIQUID ORAL
Qty: 280 | Refills: 0
Start: 2024-10-21 | End: 2024-10-27

## 2024-10-21 RX ORDER — LEVOFLOXACIN 750 MG/1
1 TABLET, FILM COATED ORAL
Qty: 5 | Refills: 0
Start: 2024-10-21 | End: 2024-10-25

## 2024-10-21 RX ADMIN — GUAIFENESIN 1200 MILLIGRAM(S): 100 LIQUID ORAL at 05:41

## 2024-10-21 RX ADMIN — FLUTICASONE PROPIONATE 1 SPRAY(S): 50 SPRAY, METERED NASAL at 05:41

## 2024-10-21 RX ADMIN — Medication 1000 MILLIGRAM(S): at 11:45

## 2024-10-21 RX ADMIN — METHYLPREDNISOLONE ACETATE 40 MILLIGRAM(S): 80 INJECTION, SUSPENSION INTRALESIONAL; INTRAMUSCULAR; INTRASYNOVIAL; SOFT TISSUE at 05:42

## 2024-10-21 RX ADMIN — Medication 40 MILLIGRAM(S): at 05:42

## 2024-10-21 RX ADMIN — IPRATROPIUM BROMIDE AND ALBUTEROL SULFATE 3 MILLILITER(S): .5; 2.5 SOLUTION RESPIRATORY (INHALATION) at 05:42

## 2024-10-21 RX ADMIN — Medication 2.5 MILLIGRAM(S): at 05:42

## 2024-10-21 RX ADMIN — FLUTICASONE PROPIONATE AND SALMETEROL XINAFOATE 1 DOSE(S): 230; 21 AEROSOL, METERED RESPIRATORY (INHALATION) at 09:27

## 2024-10-21 RX ADMIN — Medication 81 MILLIGRAM(S): at 11:45

## 2024-10-21 RX ADMIN — OXCARBAZEPINE 300 MILLIGRAM(S): 60 SUSPENSION ORAL at 05:43

## 2024-10-21 RX ADMIN — IPRATROPIUM BROMIDE AND ALBUTEROL SULFATE 3 MILLILITER(S): .5; 2.5 SOLUTION RESPIRATORY (INHALATION) at 09:27

## 2024-10-21 RX ADMIN — AZITHROMYCIN DIHYDRATE 255 MILLIGRAM(S): 200 POWDER, FOR SUSPENSION ORAL at 11:45

## 2024-10-21 NOTE — DISCHARGE NOTE PROVIDER - HOSPITAL COURSE
80yo male with HTN, T2DM,  CAD, BPH, COPD, brain hemangioma s/p laser treatment in 2008, seizures, myasthenia gravis who presents to the ED complaining of SOB. In the ED found to be febrile to 103.6, tachycardic to 109, normotensive and on 4-6L NC. CXR done without clear consolidation or infiltrates, blood gas w/o hypercapnia and WBC 20.07. Pt was admitted with acute and chronic respiratory failure secondary to COPD, secondary enterovirus, PNA cant be excluded, as well as sepsis. Given azithromycin and ceftriaxone for concern of PNA, duoneb, solumedrol due to wheezing, mucinex, and flonase/nasal saline. Pt is now afebrile, WBC count is trending downward, and wheezing has improved, medications regarding COPD exacerbation and possible PNA will be continued as directed. The pt was also found to have elevated liver enzymes of unclear etiology. He denied any abdominal pain or N/V. Rosuvastatin was considered to be held if LFTs were trending up, however, LFTs appear to be trending downwards and improving, medications will be taken as directed. The current plan is for the pt to be discharged home on 2L nasal cannula, and has declined [facility]. Acute on chronic hypoxic respiratory failure secondary to COPD exacerbation, secondary enterovirus infection, PNA cant be excluded. Afebrile, Elevated WBC. RVP (+). Elevated liver enzymes of unclear etiology, improving, no abd pain, n/v. 78yo male with HTN, T2DM,  CAD, BPH, COPD, brain hemangioma s/p laser treatment in 2008, seizures, myasthenia gravis who presents to the ED complaining of SOB. In the ED found to be febrile to 103.6, tachycardic to 109, normotensive and on 4-6L NC. CXR done without clear consolidation or infiltrates, blood gas w/o hypercapnia and WBC 20.07. Pt was admitted with acute and chronic respiratory failure secondary to COPD, secondary enterovirus, PNA cant be excluded, as well as sepsis. Given azithromycin and ceftriaxone for concern of PNA, duoneb, solumedrol due to wheezing, mucinex, and flonase/nasal saline. Pt is now afebrile, WBC count is trending downward, and wheezing has improved. Patient to be d/c on antibiotics and quick prednisone taper. The pt was also found to have elevated liver enzymes of unclear etiology. He denied any abdominal pain or N/V. LFTs downtrending. Patient is medically stable for d/c on home 2L nasal cannula, and has declined [facility].

## 2024-10-21 NOTE — DISCHARGE NOTE NURSING/CASE MANAGEMENT/SOCIAL WORK - PATIENT PORTAL LINK FT
You can access the FollowMyHealth Patient Portal offered by [facility] by registering at the following website: [email or web address]. By joining [facility]’s FollowMyHealth portal, you will also be able to view your health information using other applications (apps) compatible with our system.

## 2024-10-21 NOTE — DISCHARGE NOTE NURSING/CASE MANAGEMENT/SOCIAL WORK - NSDCVIVACCINE_GEN_ALL_CORE_FT
Tdap; 13-Feb-2023 17:57; Alfred Arreaga (NEAL); [employer]; [id number] (Exp. Date: 08-Dec-2024); IntraMuscular; Deltoid Right.; 0.5 milliLiter(s); VIS (VIS Published: 09-May-2013, VIS Presented: 13-Feb-2023);

## 2024-10-21 NOTE — DISCHARGE NOTE NURSING/CASE MANAGEMENT/SOCIAL WORK - FINANCIAL ASSISTANCE
[facility] provides services at a reduced cost to those who are determined to be eligible through [facility]’s financial assistance program. Information regarding [facility]’s financial assistance program can be found by going to [email or web address] or by calling [phone].

## 2024-10-21 NOTE — DISCHARGE NOTE PROVIDER - NSDCFUSCHEDAPPT_GEN_ALL_CORE_FT
[place] [address] [address] [place]  Scheduled Appointment: 11/12/2024    Maurice Rose  [place] [address] [address]   Scheduled Appointment: 11/12/2024

## 2024-10-21 NOTE — DISCHARGE NOTE PROVIDER - NSDCMRMEDTOKEN_GEN_ALL_CORE_FT
albuterol 2.5 mg/3 mL (0.083%) inhalation solution: 3 milliliter(s) inhaled every 6 hours as needed for  shortness of breath and/or wheezing  albuterol 90 mcg/inh inhalation aerosol: 2 puff(s) inhaled every 6 hours as needed for  shortness of breath and/or wheezing  lisinopril 2.5 mg oral tablet: 1 tab(s) orally once a day  OXcarbazepine 300 mg oral tablet: 1 tab(s) orally 3 times a day  rosuvastatin 10 mg oral tablet: 1 tab(s) orally once a day (at bedtime)  umeclidinium 62.5 mcg/inh inhalation powder: 1 puff(s) inhaled once a day   albuterol 2.5 mg/3 mL (0.083%) inhalation solution: 3 milliliter(s) inhaled every 6 hours as needed for  shortness of breath and/or wheezing  albuterol 90 mcg/inh inhalation aerosol: 2 puff(s) inhaled every 6 hours as needed for  shortness of breath and/or wheezing  aspirin 81 mg oral tablet, chewable: 1 tab(s) orally once a day  fluticasone 50 mcg/inh nasal spray: 1 spray(s) nasal 2 times a day  guaiFENesin 100 mg/5 mL oral liquid: 10 milliliter(s) orally every 6 hours as needed for  cough  levoFLOXacin 750 mg oral tablet: 1 tab(s) orally once a day  lisinopril 2.5 mg oral tablet: 1 tab(s) orally once a day  OXcarbazepine 300 mg oral tablet: 1 tab(s) orally 3 times a day  predniSONE 10 mg oral tablet: 4 tab(s) orally once a day take 4tabs x 2 days (10/21-10/22), then take 3 tabs x 2 days (10/23-10/24), then take 2 tabs x 2 days (10/25-10/26), then take 1 tabs x 2 days (10/27-10/28), then stop.  rosuvastatin 10 mg oral tablet: 1 tab(s) orally once a day (at bedtime)  sodium chloride 0.65% nasal spray: 1 spray(s) nasal every 6 hours as needed for  dry nasal passages  umeclidinium 62.5 mcg/inh inhalation powder: 1 puff(s) inhaled once a day

## 2024-10-21 NOTE — DISCHARGE NOTE PROVIDER - NSDCCPCAREPLAN_GEN_ALL_CORE_FT
PRINCIPAL DISCHARGE DIAGNOSIS  Diagnosis: Pneumonia  Assessment and Plan of Treatment: resolved.      SECONDARY DISCHARGE DIAGNOSES  Diagnosis: Fever  Assessment and Plan of Treatment: resolved    Diagnosis: Liver enzyme elevation  Assessment and Plan of Treatment:      PRINCIPAL DISCHARGE DIAGNOSIS  Diagnosis: Acute on chronic hypoxic respiratory failure  Assessment and Plan of Treatment: 2/2 COPD exaccerbation, PNA; + RVP  pt now on home O2 requirements   finish course of abx outpt  prednisone short taper outpt      SECONDARY DISCHARGE DIAGNOSES  Diagnosis: Transaminitis  Assessment and Plan of Treatment: denies abd pain, n/v  tolerating diet  LFTs improving    Diagnosis: CAD (coronary artery disease)  Assessment and Plan of Treatment: c/w statin, asa  lisinopril    Diagnosis: Seizure disorder  Assessment and Plan of Treatment: c/w oxcarbazepine

## 2024-10-21 NOTE — DISCHARGE NOTE PROVIDER - YES NO FOR MLM POSITIVE OR NEGATIVE COVID RESULT
Liters #3 and 4 of iv boluses just finished at same time; bp low at 79/40; dr gusman called and notified; fifth liter of iv bolus ordered and hung   ,

## 2024-10-21 NOTE — DISCHARGE NOTE PROVIDER - ATTENDING DISCHARGE PHYSICAL EXAMINATION:
CONSTITUTIONAL: No apparent distress  HEENT: Normocephalic, Atraumatic.   RESPIRATORY: decreased breath sounds   CARDIOVASCULAR: Regular rate and rhythm, No lower extremity edema  ABDOMEN: Soft, non-distended, nontender to palpation, +BS  MUSCLOSKELETAL: moving all 4 extremities spontaneously  PSYCH: thoughts linear, affect appropriate  NEUROLOGY: Alert, Oriented x3

## 2024-10-22 LAB
CULTURE RESULTS: SIGNIFICANT CHANGE UP
SPECIMEN SOURCE: SIGNIFICANT CHANGE UP

## 2024-11-12 ENCOUNTER — APPOINTMENT (OUTPATIENT)
Dept: PULMONOLOGY | Facility: CLINIC | Age: 79
End: 2024-11-12
Payer: MEDICARE

## 2024-11-12 VITALS
HEART RATE: 75 BPM | DIASTOLIC BLOOD PRESSURE: 62 MMHG | SYSTOLIC BLOOD PRESSURE: 142 MMHG | RESPIRATION RATE: 16 BRPM | OXYGEN SATURATION: 93 %

## 2024-11-12 VITALS — HEIGHT: 61.5 IN | BODY MASS INDEX: 36.35 KG/M2 | WEIGHT: 195 LBS

## 2024-11-12 DIAGNOSIS — Z09 ENCOUNTER FOR FOLLOW-UP EXAMINATION AFTER COMPLETED TREATMENT FOR CONDITIONS OTHER THAN MALIGNANT NEOPLASM: ICD-10-CM

## 2024-11-12 DIAGNOSIS — J44.1 CHRONIC OBSTRUCTIVE PULMONARY DISEASE WITH (ACUTE) EXACERBATION: ICD-10-CM

## 2024-11-12 DIAGNOSIS — J44.9 CHRONIC OBSTRUCTIVE PULMONARY DISEASE, UNSPECIFIED: ICD-10-CM

## 2024-11-12 PROCEDURE — 94010 BREATHING CAPACITY TEST: CPT

## 2024-11-12 PROCEDURE — 99215 OFFICE O/P EST HI 40 MIN: CPT | Mod: 25

## 2024-11-12 RX ORDER — LEVOFLOXACIN 750 MG/1
750 TABLET, FILM COATED ORAL
Refills: 0 | Status: ACTIVE | COMMUNITY

## 2024-11-12 RX ORDER — GUAIFENESIN 100 MG/5ML
100 SOLUTION ORAL
Refills: 0 | Status: ACTIVE | COMMUNITY

## 2024-11-12 RX ORDER — LISINOPRIL 2.5 MG/1
2.5 TABLET ORAL
Refills: 0 | Status: ACTIVE | COMMUNITY

## 2024-11-26 PROCEDURE — 85027 COMPLETE CBC AUTOMATED: CPT

## 2024-11-26 PROCEDURE — 83605 ASSAY OF LACTIC ACID: CPT

## 2024-11-26 PROCEDURE — 96366 THER/PROPH/DIAG IV INF ADDON: CPT

## 2024-11-26 PROCEDURE — 94640 AIRWAY INHALATION TREATMENT: CPT

## 2024-11-26 PROCEDURE — 82435 ASSAY OF BLOOD CHLORIDE: CPT

## 2024-11-26 PROCEDURE — 83880 ASSAY OF NATRIURETIC PEPTIDE: CPT

## 2024-11-26 PROCEDURE — 83735 ASSAY OF MAGNESIUM: CPT

## 2024-11-26 PROCEDURE — 82947 ASSAY GLUCOSE BLOOD QUANT: CPT

## 2024-11-26 PROCEDURE — 85610 PROTHROMBIN TIME: CPT

## 2024-11-26 PROCEDURE — 99285 EMERGENCY DEPT VISIT HI MDM: CPT | Mod: 25

## 2024-11-26 PROCEDURE — 80048 BASIC METABOLIC PNL TOTAL CA: CPT

## 2024-11-26 PROCEDURE — 85025 COMPLETE CBC W/AUTO DIFF WBC: CPT

## 2024-11-26 PROCEDURE — 82330 ASSAY OF CALCIUM: CPT

## 2024-11-26 PROCEDURE — 96365 THER/PROPH/DIAG IV INF INIT: CPT

## 2024-11-26 PROCEDURE — 96375 TX/PRO/DX INJ NEW DRUG ADDON: CPT

## 2024-11-26 PROCEDURE — 85018 HEMOGLOBIN: CPT

## 2024-11-26 PROCEDURE — 80053 COMPREHEN METABOLIC PANEL: CPT

## 2024-11-26 PROCEDURE — 87040 BLOOD CULTURE FOR BACTERIA: CPT

## 2024-11-26 PROCEDURE — 0225U NFCT DS DNA&RNA 21 SARSCOV2: CPT

## 2024-11-26 PROCEDURE — 85730 THROMBOPLASTIN TIME PARTIAL: CPT

## 2024-11-26 PROCEDURE — 85014 HEMATOCRIT: CPT

## 2024-11-26 PROCEDURE — 71045 X-RAY EXAM CHEST 1 VIEW: CPT

## 2024-11-26 PROCEDURE — 36415 COLL VENOUS BLD VENIPUNCTURE: CPT

## 2024-11-26 PROCEDURE — 84484 ASSAY OF TROPONIN QUANT: CPT

## 2024-11-26 PROCEDURE — 96368 THER/DIAG CONCURRENT INF: CPT

## 2024-11-26 PROCEDURE — 84132 ASSAY OF SERUM POTASSIUM: CPT

## 2024-11-26 PROCEDURE — 93005 ELECTROCARDIOGRAM TRACING: CPT

## 2024-11-26 PROCEDURE — 82803 BLOOD GASES ANY COMBINATION: CPT

## 2024-11-26 PROCEDURE — 94760 N-INVAS EAR/PLS OXIMETRY 1: CPT

## 2024-11-26 PROCEDURE — 84295 ASSAY OF SERUM SODIUM: CPT

## 2025-03-07 ENCOUNTER — APPOINTMENT (OUTPATIENT)
Dept: PULMONOLOGY | Facility: CLINIC | Age: 80
End: 2025-03-07